# Patient Record
Sex: MALE | Race: WHITE | Employment: OTHER | URBAN - METROPOLITAN AREA
[De-identification: names, ages, dates, MRNs, and addresses within clinical notes are randomized per-mention and may not be internally consistent; named-entity substitution may affect disease eponyms.]

---

## 2017-08-28 PROBLEM — I25.10 CORONARY ARTERY DISEASE INVOLVING NATIVE CORONARY ARTERY OF NATIVE HEART WITHOUT ANGINA PECTORIS: Status: ACTIVE | Noted: 2017-08-28

## 2017-08-28 PROBLEM — M16.0 PRIMARY OSTEOARTHRITIS OF BOTH HIPS: Status: ACTIVE | Noted: 2017-08-28

## 2017-10-11 PROBLEM — Z23 NEED FOR INFLUENZA VACCINATION: Status: ACTIVE | Noted: 2017-10-11

## 2018-04-12 PROBLEM — Z23 NEED FOR INFLUENZA VACCINATION: Status: RESOLVED | Noted: 2017-10-11 | Resolved: 2018-04-12

## 2018-08-28 ENCOUNTER — OFFICE VISIT (OUTPATIENT)
Dept: FAMILY MEDICINE CLINIC | Age: 83
End: 2018-08-28
Payer: MEDICARE

## 2018-08-28 VITALS
WEIGHT: 195 LBS | HEIGHT: 69 IN | BODY MASS INDEX: 28.88 KG/M2 | DIASTOLIC BLOOD PRESSURE: 72 MMHG | OXYGEN SATURATION: 98 % | RESPIRATION RATE: 20 BRPM | SYSTOLIC BLOOD PRESSURE: 132 MMHG | HEART RATE: 54 BPM

## 2018-08-28 DIAGNOSIS — E11.9 TYPE 2 DIABETES MELLITUS WITHOUT COMPLICATION, WITHOUT LONG-TERM CURRENT USE OF INSULIN (HCC): Primary | ICD-10-CM

## 2018-08-28 DIAGNOSIS — N40.1 BPH WITH OBSTRUCTION/LOWER URINARY TRACT SYMPTOMS: ICD-10-CM

## 2018-08-28 DIAGNOSIS — I25.10 CORONARY ARTERY DISEASE INVOLVING NATIVE CORONARY ARTERY OF NATIVE HEART WITHOUT ANGINA PECTORIS: ICD-10-CM

## 2018-08-28 DIAGNOSIS — N13.8 BPH WITH OBSTRUCTION/LOWER URINARY TRACT SYMPTOMS: ICD-10-CM

## 2018-08-28 DIAGNOSIS — M47.816 SPONDYLOSIS OF LUMBAR REGION WITHOUT MYELOPATHY OR RADICULOPATHY: ICD-10-CM

## 2018-08-28 LAB — HBA1C MFR BLD: 5.6 %

## 2018-08-28 PROCEDURE — 99214 OFFICE O/P EST MOD 30 MIN: CPT | Performed by: FAMILY MEDICINE

## 2018-08-28 PROCEDURE — 83036 HEMOGLOBIN GLYCOSYLATED A1C: CPT | Performed by: FAMILY MEDICINE

## 2018-08-28 RX ORDER — LOPERAMIDE HYDROCHLORIDE 2 MG/1
2 CAPSULE ORAL PRN
COMMUNITY
End: 2019-02-27

## 2018-08-28 RX ORDER — TAMSULOSIN HYDROCHLORIDE 0.4 MG/1
0.4 CAPSULE ORAL DAILY
Qty: 90 CAPSULE | Refills: 1 | Status: SHIPPED | OUTPATIENT
Start: 2018-08-28 | End: 2019-03-04 | Stop reason: SDUPTHER

## 2018-08-28 RX ORDER — MELOXICAM 7.5 MG/1
7.5 TABLET ORAL DAILY
Qty: 90 TABLET | Refills: 1 | Status: SHIPPED | OUTPATIENT
Start: 2018-08-28 | End: 2019-02-27

## 2018-08-28 ASSESSMENT — ENCOUNTER SYMPTOMS
SHORTNESS OF BREATH: 0
NAUSEA: 0
VOMITING: 0
BACK PAIN: 1
BLURRED VISION: 0
DIARRHEA: 1

## 2018-08-28 ASSESSMENT — PATIENT HEALTH QUESTIONNAIRE - PHQ9
SUM OF ALL RESPONSES TO PHQ QUESTIONS 1-9: 0
1. LITTLE INTEREST OR PLEASURE IN DOING THINGS: 0
2. FEELING DOWN, DEPRESSED OR HOPELESS: 0
SUM OF ALL RESPONSES TO PHQ QUESTIONS 1-9: 0
SUM OF ALL RESPONSES TO PHQ9 QUESTIONS 1 & 2: 0

## 2018-08-28 NOTE — PROGRESS NOTES
CAPS Take 200 mg by mouth daily (with breakfast)   Yes Historical Provider, MD   Blood Glucose Monitoring Suppl (ONE TOUCH ULTRA 2) W/DEVICE KIT 1 kit by Does not apply route daily as needed. 4/6/15  Yes Shasha Palafox MD   metoprolol (LOPRESSOR) 25 MG tablet Take 0.5 tablets by mouth 2 times daily. 3/30/13  Yes Neel Romeo MD   aspirin 81 MG tablet Take 81 mg by mouth daily. Yes Historical Provider, MD   multivitamin [facility]) per tablet Take 1 tablet by mouth daily. Yes Historical Provider, MD   Calcium Carbonate-Vitamin D (CALCIUM + D) 600-200 MG-UNIT TABS Take 600 mg by mouth.    Yes Historical Provider, MD     Social History     Social History    Marital status:      Spouse name: N/A    Number of children: N/A    Years of education: N/A     Social History Main Topics    Smoking status: Former Smoker     Years: 32.00     Quit date: 3/26/1987    Smokeless tobacco: Former User    Alcohol use No    Drug use: No    Sexual activity: Yes     Partners: Female     Other Topics Concern    None     Social History Narrative    None       I have reviewed Hernando's allergies, medications, problem list, medical, social and family history and have updated as needed in the electronic medical record    Current Outpatient Prescriptions   Medication Sig Dispense Refill    meloxicam (MOBIC) 7.5 MG tablet Take 1 tablet by mouth daily 90 tablet 1    ONE TOUCH LANCETS MISC Check blood glucose qam 50 each 12    blood glucose test strips (ONE TOUCH ULTRA TEST) strip Check blood glucose qam 50 each 12    loperamide (IMODIUM) 2 MG capsule Take 2 mg by mouth as needed for Diarrhea      tamsulosin (FLOMAX) 0.4 MG capsule Take 1 capsule by mouth daily 90 capsule 1    Glucos-Chond-Hyal Ac-Ca Fructo (MOVE FREE JOINT HEALTH ADVANCE PO) Take by mouth 3 times daily      Handicap Placard MISC by Does not apply route Unable to ambulate more than 40 feet without difficulty  Expires 2/28/2022 1 each 0   

## 2018-09-20 ENCOUNTER — OFFICE VISIT (OUTPATIENT)
Dept: PHYSICAL MEDICINE AND REHAB | Age: 83
End: 2018-09-20
Payer: MEDICARE

## 2018-09-20 VITALS
HEIGHT: 69 IN | WEIGHT: 196 LBS | HEART RATE: 65 BPM | SYSTOLIC BLOOD PRESSURE: 138 MMHG | DIASTOLIC BLOOD PRESSURE: 68 MMHG | BODY MASS INDEX: 29.03 KG/M2

## 2018-09-20 DIAGNOSIS — M54.50 CHRONIC BILATERAL LOW BACK PAIN WITHOUT SCIATICA: Primary | ICD-10-CM

## 2018-09-20 DIAGNOSIS — M47.819 FACET ARTHROPATHY: ICD-10-CM

## 2018-09-20 DIAGNOSIS — M48.061 SPINAL STENOSIS OF LUMBAR REGION WITHOUT NEUROGENIC CLAUDICATION: ICD-10-CM

## 2018-09-20 DIAGNOSIS — G89.29 CHRONIC BILATERAL LOW BACK PAIN WITHOUT SCIATICA: Primary | ICD-10-CM

## 2018-09-20 PROCEDURE — 99204 OFFICE O/P NEW MOD 45 MIN: CPT | Performed by: PHYSICAL MEDICINE & REHABILITATION

## 2018-09-20 NOTE — PROGRESS NOTES
(FLOMAX) 0.4 MG capsule Take 1 capsule by mouth daily 90 capsule 1    Glucos-Chond-Hyal Ac-Ca Fructo (MOVE FREE JOINT HEALTH ADVANCE PO) Take by mouth 3 times daily      Handicap Placard MISC by Does not apply route Unable to ambulate more than 40 feet without difficulty  Expires 2/28/2022 1 each 0    atorvastatin (LIPITOR) 10 MG tablet       Coenzyme Q-10 200 MG CAPS Take 200 mg by mouth daily (with breakfast)      Blood Glucose Monitoring Suppl (ONE TOUCH ULTRA 2) W/DEVICE KIT 1 kit by Does not apply route daily as needed. 1 kit 0    metoprolol (LOPRESSOR) 25 MG tablet Take 0.5 tablets by mouth 2 times daily. 60 tablet 1    aspirin 81 MG tablet Take 81 mg by mouth daily.  multivitamin (THERAGRAN) per tablet Take 1 tablet by mouth daily.  Calcium Carbonate-Vitamin D (CALCIUM + D) 600-200 MG-UNIT TABS Take 600 mg by mouth. No current facility-administered medications for this visit. Past Medical History:   Diagnosis Date    Bundle branch block     CAD (coronary artery disease)     Diabetes mellitus ([facility] [address]     Hyperlipidemia LDL goal < 100 8/24/2015    Spondylosis of lumbar region without myelopathy or radiculopathy 8/28/2018       Past Surgical History:   Procedure Laterality Date    CORONARY ARTERY BYPASS GRAFT  3/27/13    off-pump cabg x 3- Dr. Maico Boo CATH LAB PROCEDURE  3/26/13    FRACTURE SURGERY      TONSILLECTOMY         Family History   Problem Relation Age of Onset    Heart Disease Mother     Heart Disease Father     Heart Disease Brother        Social History     Social History    Marital status:      Spouse name: N/A    Number of children: N/A    Years of education: N/A     Occupational History    Not on file.      Social History Main Topics    Smoking status: Former Smoker     Years: 32.00     Quit date: 3/26/1987    Smokeless tobacco: Former User    Alcohol use No    Drug use: No    Sexual activity: Yes     Partners: Female Other Topics Concern    Not on file     Social History Narrative    No narrative on file       Functional Status: The patient is able to ambulate and perform activities of daily living without the use of an assistive device. Occupation: The patient is currently retired. ROS: For more complete ROS answered by the patient, please see . Constitutional: Denies fevers, chills, night sweats, unintentional weight loss     Skin: Denies rash or skin changes     Eyes: Denies vision changes    Ears/Nose/Throat: Denies nasal congestion or sore throat     Respiratory: Denies SOB or cough     Cardiovascular: Denies CP, palpitations, edema      Gastrointestinal: Denies abdominal pain,  N/V, constipation, or diarrhea    Genitourinary: Denies urinary symptoms    Neurologic: See HPI.     MSK: See HPI. Psychiatric: Denies sleep disturbance, anxiety, depression    Hematologic/Lymphatic/Immunologic: Denies bruising       Physical Exam:   Blood pressure 138/68, pulse 65, height 5' 9\" (1.753 m), weight 196 lb (88.9 kg). General: well developed and well nourished in no acute distress. Body habitus is normal  HEENT: No rhinorrhea, sneezing, yawning, or lacrimation. No scleral icterus or conjunctival injection. Resp: symmetrical chest expansion, unlabored breathing, respirations unlabored. CV: Heart rate is regular. Peripheral pulses are palpable  Lymph: No visible regional lymphadenopathy. Skin: No rashes or ecchymosis. Normal turgor. Psych: Mood is calm. Affect is normal.   Ext: No edema noted     MSK:   Back/Hip Exam:   Inspection: Pelvis was asymmetric. Lumbar lordotic curvature was decreased. There was scoliosis present. No ecchymoses or erythema. Palpation: Palpatory exam revealed no tenderness along lumbosacral paraspinals, midline spine, SI joint sulcus, greater trochanters and TFL on both side. There was left lumbar paraspinal spasms. There were no trigger points.     ROM: ROM

## 2018-09-20 NOTE — PATIENT INSTRUCTIONS
We appreciate that you chose [facility] to provide your healthcare needs today. We took great pleasure in caring for you. You may receive a survey to help us learn how to make your next visit to a [facility] facility better than the last.   Your feedback is important to help us continually improve the service we can provide you. Please take the time to complete it thoughtfully if you receive one as we value the feedback in every survey. In this survey we would appreciate that you answer \"always\" or \"yes\" for as many questions as possible (this is the answer we get credit for doing a good job). If you feel there is a question you cannot answer \"always\" or \"yes\", please let us know before you leave today so we can remedy the situation right away. We look forward to continuing to provide you with excellent care. Considering the survey questions related to access to care, please keep in mind the urgency of your problem (i.e. was it an emergent or urgent visit or more routine)  and whether the urgency of that problem was handled appropriately by our office. We always do our best to prioritize the patients who need the care most urgently given our specialty is in short supply and there is a high demand for visits. Thank you for your time and consideration.

## 2018-10-23 ENCOUNTER — TELEPHONE (OUTPATIENT)
Dept: PHYSICAL MEDICINE AND REHAB | Age: 83
End: 2018-10-23

## 2018-10-23 NOTE — TELEPHONE ENCOUNTER
Called and spoke with the patient to make him an appointment to go over MRI results. Patient is scheduled on 11-12-18.

## 2018-10-23 NOTE — TELEPHONE ENCOUNTER
----- Message from Killian Arango DO sent at 10/23/2018  8:54 AM EDT -----  Please call patient to schedule follow up to review MRI. Nothing urgent.

## 2018-11-12 ENCOUNTER — OFFICE VISIT (OUTPATIENT)
Dept: PHYSICAL MEDICINE AND REHAB | Age: 83
End: 2018-11-12
Payer: MEDICARE

## 2018-11-12 VITALS
HEART RATE: 56 BPM | WEIGHT: 195 LBS | DIASTOLIC BLOOD PRESSURE: 82 MMHG | BODY MASS INDEX: 28.88 KG/M2 | HEIGHT: 69 IN | SYSTOLIC BLOOD PRESSURE: 137 MMHG

## 2018-11-12 DIAGNOSIS — M47.816 LUMBAR SPONDYLOSIS: Primary | ICD-10-CM

## 2018-11-12 DIAGNOSIS — M47.819 FACET ARTHROPATHY: ICD-10-CM

## 2018-11-12 DIAGNOSIS — M79.18 MYOFASCIAL PAIN: ICD-10-CM

## 2018-11-12 PROCEDURE — 99213 OFFICE O/P EST LOW 20 MIN: CPT | Performed by: PHYSICAL MEDICINE & REHABILITATION

## 2018-11-12 NOTE — PROGRESS NOTES
[employer], [id number] [facility]  [address] [place] [address] [address] [facility]  Phone: [phone]  Fax: [phone]    PCP: Yadira Forrest MD  Date of visit: 11/12/18    Chief Complaint   Patient presents with    Back Pain     follow up and results MRI       Interval:   Patient presents today to review MRI results. MRI reveals facet arthropathy, mild stenosis. He did not start PT because he wanted to wait for the MRI results. The pain is rated Pain Score:   5, is described as achy, deep, and is located in the low back without radiation to the legs. The pain is better with mobic, flexing forward. The pain is worse with standing up straight, walking. There is no associated numbness/tingling. There is no weakness. There is no bowel/bladder changes. The prior workup has included: Xray, MRI     The prior treatment has included:  PT: none, but goes to [address] [place], [place] [address] at the [facility] 3-5 times a week   Chiropractic: none    Modalities: none   OTC Tylenol: yes with relief   NSAIDS: CAD.   mobic currently with relief, voltaren gel with some relief    Opioids: none    Membrane stabilizers: none   Muscle relaxers: none    Previous injections: none   Previous surgery at this site: none      No Known Allergies    Current Outpatient Prescriptions   Medication Sig Dispense Refill    meloxicam (MOBIC) 7.5 MG tablet Take 1 tablet by mouth daily 90 tablet 1    ONE TOUCH LANCETS MISC Check blood glucose qam 50 each 12    blood glucose test strips (ONE TOUCH ULTRA TEST) strip Check blood glucose qam 50 each 12    loperamide (IMODIUM) 2 MG capsule Take 2 mg by mouth as needed for Diarrhea      tamsulosin (FLOMAX) 0.4 MG capsule Take 1 capsule by mouth daily 90 capsule 1    Glucos-Chond-Hyal Ac-Ca Fructo (MOVE FREE JOINT HEALTH ADVANCE PO) Take by mouth 3 times daily      Handicap Placard MISC by Does not apply route Unable to ambulate more than 40 feet without lower extremity dermatomes. Reflexes:   R  L  Patellar  (2+) (2+)  Ankle Jerk  (1+) (1+)    Gait is forward flexed. Imaging: (personally reviewed by me 11/12/18)  X-ray L spine      Findings:   AP and lateral views of the lumbar spine were obtained. Mild   multilevel disc space narrowing as well as facet joint narrowing with   subchondral sclerosis and spurring. No fracture.           Impression   Multilevel degenerative disc and degenerative joint   disease. MRI L Spine       T12-L1: Normal T12-L1       L1-L2: Noncompressive bulge and facet hypertrophy. No canal or   foraminal stenosis.       L2-L3: Degenerative disc height loss noted with noncompressive bulge. No central or foraminal stenosis.       L3-L4: Degenerative disc disease noted with noncompressive bulge. Mild   foraminal stenosis noted.       L4-L5: Facet hypertrophy and ligamentum flavum infolding. Small joint   effusion seen with mild disc bulge. Mild lateral recess and moderate   foraminal stenosis noted.       L5-S1: Facet hypertrophy noted with mild foraminal stenosis. No canal   stenosis.       SOFT TISSUES: There is mild increase edema seen in the paraspinal   musculature of the lower lumbar spine with no contusion or facet   abnormality.           Impression       1.  Mild edema in the lower lumbar paraspinal musculature may be   related to muscle strain. 2.  Multilevel degenerative disc disease as discussed. Findings most   severe at L4-5.             Impression:   Sintia Victoria is a 81 y.o. male     1. Lumbar spondylosis    2. Facet arthropathy    3. Myofascial pain        Plan:   · MRI reviewed with patient and wife in detail. .   · Continue voltaren gel QID PRN   · Refer to PT   · Continue [facility] at the [facility]     The patient was educated about the diagnosis, prognosis, indications, risks and benefits of treatment. An opportunity to ask questions was given to the patient and questions were answered.   The patient agreed to

## 2018-11-26 ENCOUNTER — EVALUATION (OUTPATIENT)
Dept: PHYSICAL THERAPY | Age: 83
End: 2018-11-26
Payer: MEDICARE

## 2018-11-26 DIAGNOSIS — M79.18 MYOFASCIAL PAIN: ICD-10-CM

## 2018-11-26 DIAGNOSIS — M47.816 LUMBAR SPONDYLOSIS: Primary | ICD-10-CM

## 2018-11-26 DIAGNOSIS — M47.819 FACET ARTHROPATHY: ICD-10-CM

## 2018-11-26 PROCEDURE — 97161 PT EVAL LOW COMPLEX 20 MIN: CPT | Performed by: PHYSICAL THERAPIST

## 2018-11-26 PROCEDURE — 97110 THERAPEUTIC EXERCISES: CPT | Performed by: PHYSICAL THERAPIST

## 2018-11-26 PROCEDURE — G8978 MOBILITY CURRENT STATUS: HCPCS | Performed by: PHYSICAL THERAPIST

## 2018-11-26 PROCEDURE — G8979 MOBILITY GOAL STATUS: HCPCS | Performed by: PHYSICAL THERAPIST

## 2018-11-26 NOTE — PROGRESS NOTES
Physical Therapy Treatment Note    Date: 2018  Patient Name: Daxa Matias  : 1935   MRN: [id number]  [facility]: insidious  DOSx: -  Referring Provider: DO Lainey  [address] [place] [address], [address] [facility]     Medical Diagnosis:    Diagnosis Orders   1. Lumbar spondylosis     2. Facet arthropathy     3. Myofascial pain         Outcome Measure: PT G-Codes  Functional Limitation: Mobility: Walking and moving around  Mobility: Walking and Moving Around Current Status (): At least 20 percent but less than 40 percent impaired, limited or restricted  Mobility: Walking and Moving Around Goal Status (): At least 1 percent but less than 20 percent impaired, limited or restricted    S: see eval  O:  Time 800-900     Visit 1     Pain 4/10     ROM      Modalities      MH + ES            Exercise      LB rotation X 15  TE   SKTC 2 x 30 sec each  TE   HS 2 x 30 sec  TE   Seated trunk flexion 2 x 30 sec     ALL EXERCISE DONE WITH DRAW-IN TECHNIQUE                            Functional activities To aid in reaching , pushing, pulling tasks at home     ROWS: H  \"    ROWS: M  \"    ROWS: L  \"    Obliques - high  \"    Obliques - low  \"     THEREX     Nustep   L7 x 10 min  TE   Punches      Lat pulldowns      Triceps ext standing      Marching            Trunk ext TB      Trunk flex TB      Hip abd      Hip EXT      TG Squats      Gait 2 x 100 feet  Cues for upright          A:  Tolerated well. Above added to written HEP.   P: Continue with rehab plan  Bryant Berman, PT    Treatment Charges: Mins Units   Initial Evaluation 30 1   Re-Evaluation     Ther Exercise         TE 20 1   Manual Therapy     MT     Ther Activities        TA     Gait Training          GT     Neuro Re-education NR     Modalities     Non-Billable Service Time     Other     Total Time/Units 60 2
(IMODIUM) 2 MG capsule Take 2 mg by mouth as needed for Diarrhea      tamsulosin (FLOMAX) 0.4 MG capsule Take 1 capsule by mouth daily 90 capsule 1    Glucos-Chond-Hyal Ac-Ca Fructo (MOVE FREE JOINT HEALTH ADVANCE PO) Take by mouth 3 times daily      Handicap Placard MISC by Does not apply route Unable to ambulate more than 40 feet without difficulty  Expires 2/28/2022 1 each 0    atorvastatin (LIPITOR) 10 MG tablet       Coenzyme Q-10 200 MG CAPS Take 200 mg by mouth daily (with breakfast)      Blood Glucose Monitoring Suppl (ONE TOUCH ULTRA 2) W/DEVICE KIT 1 kit by Does not apply route daily as needed. 1 kit 0    metoprolol (LOPRESSOR) 25 MG tablet Take 0.5 tablets by mouth 2 times daily. 60 tablet 1    aspirin 81 MG tablet Take 81 mg by mouth daily.  multivitamin (THERAGRAN) per tablet Take 1 tablet by mouth daily.  Calcium Carbonate-Vitamin D (CALCIUM + D) 600-200 MG-UNIT TABS Take 600 mg by mouth. No current facility-administered medications for this visit. Imaging results: No results found.     Pain:  Current: 4/10     Best: 3/10     Worst:8/10    Symptom Type/Quality: dull, aching  Location[de-identified] Back: lumbar region does not radiate       Behavior: condition is staying the same      Provoking Activities/Positions:  [] Sitting  [x] Standing  [x] Walking                                                                     [] Lying  [] Bending  [] When still                                                                     [] On the move  [] Turning head  [] A.M.                                                                     [] P.M.  [] As day progresses [] Cough                [] Sneezing                 Relieving Activitie/Positions:      [x] Sitting  [] Standing  [] Walking                                                                     [x] Lying  [] Bending  [] When still                                                                     [] On the move  [] Turning head

## 2018-11-28 ENCOUNTER — OFFICE VISIT (OUTPATIENT)
Dept: FAMILY MEDICINE CLINIC | Age: 83
End: 2018-11-28
Payer: MEDICARE

## 2018-11-28 VITALS
HEIGHT: 69 IN | WEIGHT: 194 LBS | BODY MASS INDEX: 28.73 KG/M2 | DIASTOLIC BLOOD PRESSURE: 62 MMHG | RESPIRATION RATE: 20 BRPM | SYSTOLIC BLOOD PRESSURE: 118 MMHG | HEART RATE: 64 BPM | OXYGEN SATURATION: 97 %

## 2018-11-28 DIAGNOSIS — Z23 NEED FOR IMMUNIZATION AGAINST INFLUENZA: ICD-10-CM

## 2018-11-28 DIAGNOSIS — G89.29 CHRONIC MIDLINE LOW BACK PAIN WITHOUT SCIATICA: Primary | ICD-10-CM

## 2018-11-28 DIAGNOSIS — M54.50 CHRONIC MIDLINE LOW BACK PAIN WITHOUT SCIATICA: Primary | ICD-10-CM

## 2018-11-28 PROCEDURE — G0008 ADMIN INFLUENZA VIRUS VAC: HCPCS | Performed by: FAMILY MEDICINE

## 2018-11-28 PROCEDURE — 99213 OFFICE O/P EST LOW 20 MIN: CPT | Performed by: FAMILY MEDICINE

## 2018-11-28 PROCEDURE — 90662 IIV NO PRSV INCREASED AG IM: CPT | Performed by: FAMILY MEDICINE

## 2018-11-28 ASSESSMENT — PATIENT HEALTH QUESTIONNAIRE - PHQ9
SUM OF ALL RESPONSES TO PHQ QUESTIONS 1-9: 0
1. LITTLE INTEREST OR PLEASURE IN DOING THINGS: 0
SUM OF ALL RESPONSES TO PHQ QUESTIONS 1-9: 0
2. FEELING DOWN, DEPRESSED OR HOPELESS: 0
SUM OF ALL RESPONSES TO PHQ9 QUESTIONS 1 & 2: 0

## 2018-11-28 ASSESSMENT — ENCOUNTER SYMPTOMS
VOMITING: 0
BACK PAIN: 1
SHORTNESS OF BREATH: 0
BLOOD IN STOOL: 0
NAUSEA: 0
DIARRHEA: 0

## 2018-11-30 ENCOUNTER — TREATMENT (OUTPATIENT)
Dept: PHYSICAL THERAPY | Age: 83
End: 2018-11-30
Payer: MEDICARE

## 2018-11-30 DIAGNOSIS — M47.816 LUMBAR SPONDYLOSIS: Primary | ICD-10-CM

## 2018-11-30 PROCEDURE — 97110 THERAPEUTIC EXERCISES: CPT

## 2018-11-30 NOTE — PROGRESS NOTES
Physical Therapy Treatment Note    Date: 2018  Patient Name: Josephine Arellano  : 1935   MRN: [id number]  [facility]: insidious  DOSx: -  Referring Provider: Urbano Gibbs DO  [address] [address], [address] [facility]     Medical Diagnosis:    Diagnosis Orders   1. Lumbar spondylosis         Outcome Measure:      S: pt stated he was very sore after performing given HEP, will continue to perform but decrease intensity. O:  Time 6301-7252 am     Visit -8           Pain 4/10     ROM      Modalities      MH + ES            Exercise      LB rotation X 15               HEP  TE   SKTC 2 x 30 sec each                  \" TE   HS 2 x 30 sec                  \" TE   Seated trunk flexion 2 x 30 sec                  \" TE   ALL EXERCISE DONE WITH DRAW-IN TECHNIQUE                            Functional activities To aid in reaching , pushing, pulling tasks at home     ROWS: H Green 2 x 20  \" TE   ROWS: M Green 2 x 20  \" TE   ROWS: L Green 2 x 20  \" TE   Obliques - high      Obliques - low       THEREX     Nustep   L7 x 10 min  TE   Punches      Lat pulldowns      Triceps ext standing            Calf raises  2 x 20   TE   Sidekicks  2 x 20  TE   Marching 2 x 20   TE   Mini squats  2 x 20   TE         Trunk ext TB      Trunk flex TB      Hip abd      Hip EXT      TG Squats               A:  Tolerated well. Above added to written HEP.   P: Continue with rehab plan  Virginia [place], PTA    Treatment Charges: Mins Units   Initial Evaluation     Re-Evaluation     Ther Exercise         TE 50 3   Manual Therapy     MT     Ther Activities        TA     Gait Training          GT     Neuro Re-education NR     Modalities     Non-Billable Service Time     Other     Total Time/Units 50 3

## 2018-12-03 ENCOUNTER — TREATMENT (OUTPATIENT)
Dept: PHYSICAL THERAPY | Age: 83
End: 2018-12-03
Payer: MEDICARE

## 2018-12-03 DIAGNOSIS — M47.816 LUMBAR SPONDYLOSIS: Primary | ICD-10-CM

## 2018-12-03 PROCEDURE — 97110 THERAPEUTIC EXERCISES: CPT

## 2018-12-04 ENCOUNTER — HOSPITAL ENCOUNTER (OUTPATIENT)
Age: 83
Discharge: HOME | End: 2018-12-06
Payer: MEDICARE

## 2018-12-04 DIAGNOSIS — E11.9 TYPE 2 DIABETES MELLITUS WITHOUT COMPLICATION, WITHOUT LONG-TERM CURRENT USE OF INSULIN (HCC): ICD-10-CM

## 2018-12-04 LAB
ALBUMIN SERPL-MCNC: 4.2 G/DL (ref 3.5–5.2)
ALP BLD-CCNC: 50 U/L (ref 40–129)
ALT SERPL-CCNC: 15 U/L (ref 0–40)
ANION GAP SERPL CALCULATED.3IONS-SCNC: 13 MMOL/L (ref 7–16)
AST SERPL-CCNC: 23 U/L (ref 0–39)
BILIRUB SERPL-MCNC: 0.4 MG/DL (ref 0–1.2)
BUN BLDV-MCNC: 20 MG/DL (ref 8–23)
CALCIUM SERPL-MCNC: 9.4 MG/DL (ref 8.6–10.2)
CHLORIDE BLD-SCNC: 104 MMOL/L (ref 98–107)
CHOLESTEROL, TOTAL: 130 MG/DL (ref 0–199)
CO2: 26 MMOL/L (ref 22–29)
CREAT SERPL-MCNC: 1 MG/DL (ref 0.7–1.2)
CREATININE URINE: 66 MG/DL (ref 40–278)
CREATININE URINE: 66 MG/DL (ref 40–278)
GFR AFRICAN AMERICAN: >60
GFR NON-AFRICAN AMERICAN: >60 ML/MIN/1.73
GLUCOSE BLD-MCNC: 99 MG/DL (ref 74–99)
HCT VFR BLD CALC: 42.2 % (ref 37–54)
HCT VFR BLD CALC: 42.2 % (ref 37–54)
HDLC SERPL-MCNC: 37 MG/DL
HEMOGLOBIN: 13.1 G/DL (ref 12.5–16.5)
HEMOGLOBIN: 13.1 G/DL (ref 12.5–16.5)
LDL CHOLESTEROL CALCULATED: 73 MG/DL (ref 0–99)
MCH RBC QN AUTO: 31.8 PG (ref 26–35)
MCH RBC QN AUTO: 31.8 PG (ref 26–35)
MCHC RBC AUTO-ENTMCNC: 31 % (ref 32–34.5)
MCHC RBC AUTO-ENTMCNC: 31 % (ref 32–34.5)
MCV RBC AUTO: 102.4 FL (ref 80–99.9)
MCV RBC AUTO: 102.4 FL (ref 80–99.9)
MICROALBUMIN UR-MCNC: <12 MG/L
MICROALBUMIN UR-MCNC: <12 MG/L
MICROALBUMIN/CREAT UR-RTO: ABNORMAL (ref 0–30)
MICROALBUMIN/CREAT UR-RTO: ABNORMAL (ref 0–30)
PDW BLD-RTO: 14.9 FL (ref 11.5–15)
PDW BLD-RTO: 14.9 FL (ref 11.5–15)
PLATELET # BLD: 205 E9/L (ref 130–450)
PLATELET # BLD: 205 E9/L (ref 130–450)
PMV BLD AUTO: 8.9 FL (ref 7–12)
PMV BLD AUTO: 8.9 FL (ref 7–12)
POTASSIUM SERPL-SCNC: 4.9 MMOL/L (ref 3.5–5)
RBC # BLD: 4.12 E12/L (ref 3.8–5.8)
RBC # BLD: 4.12 E12/L (ref 3.8–5.8)
SODIUM BLD-SCNC: 143 MMOL/L (ref 132–146)
T3 TOTAL: 97.77 NG/DL (ref 80–200)
T4 TOTAL: 5.4 MCG/DL (ref 4.5–11.7)
TOTAL PROTEIN: 7.1 G/DL (ref 6.4–8.3)
TRIGL SERPL-MCNC: 100 MG/DL (ref 0–149)
TSH SERPL DL<=0.05 MIU/L-ACNC: 4.21 UIU/ML (ref 0.27–4.2)
VLDLC SERPL CALC-MCNC: 20 MG/DL
WBC # BLD: 4.5 E9/L (ref 4.5–11.5)
WBC # BLD: 4.5 E9/L (ref 4.5–11.5)

## 2018-12-04 PROCEDURE — 80053 COMPREHEN METABOLIC PANEL: CPT

## 2018-12-04 PROCEDURE — 84436 ASSAY OF TOTAL THYROXINE: CPT

## 2018-12-04 PROCEDURE — 85027 COMPLETE CBC AUTOMATED: CPT

## 2018-12-04 PROCEDURE — 82044 UR ALBUMIN SEMIQUANTITATIVE: CPT

## 2018-12-04 PROCEDURE — 84480 ASSAY TRIIODOTHYRONINE (T3): CPT

## 2018-12-04 PROCEDURE — 36415 COLL VENOUS BLD VENIPUNCTURE: CPT

## 2018-12-04 PROCEDURE — 84443 ASSAY THYROID STIM HORMONE: CPT

## 2018-12-04 PROCEDURE — 80061 LIPID PANEL: CPT

## 2018-12-04 PROCEDURE — 82570 ASSAY OF URINE CREATININE: CPT

## 2018-12-13 ENCOUNTER — TREATMENT (OUTPATIENT)
Dept: PHYSICAL THERAPY | Age: 83
End: 2018-12-13
Payer: MEDICARE

## 2018-12-13 DIAGNOSIS — M47.816 LUMBAR SPONDYLOSIS: Primary | ICD-10-CM

## 2018-12-13 PROCEDURE — 97110 THERAPEUTIC EXERCISES: CPT

## 2018-12-20 ENCOUNTER — TREATMENT (OUTPATIENT)
Dept: PHYSICAL THERAPY | Age: 83
End: 2018-12-20
Payer: MEDICARE

## 2018-12-20 DIAGNOSIS — M47.816 LUMBAR SPONDYLOSIS: Primary | ICD-10-CM

## 2018-12-20 PROCEDURE — 97110 THERAPEUTIC EXERCISES: CPT

## 2018-12-27 ENCOUNTER — TREATMENT (OUTPATIENT)
Dept: PHYSICAL THERAPY | Age: 83
End: 2018-12-27
Payer: MEDICARE

## 2018-12-27 DIAGNOSIS — M47.816 LUMBAR SPONDYLOSIS: Primary | ICD-10-CM

## 2018-12-27 PROCEDURE — G8978 MOBILITY CURRENT STATUS: HCPCS

## 2018-12-27 PROCEDURE — 97110 THERAPEUTIC EXERCISES: CPT

## 2018-12-27 PROCEDURE — G8979 MOBILITY GOAL STATUS: HCPCS

## 2018-12-27 PROCEDURE — G8980 MOBILITY D/C STATUS: HCPCS

## 2018-12-27 NOTE — PROGRESS NOTES
Physical Therapy Treatment Note    Date: 2018  Patient Name: Kylie Call  : 1935   MRN: [id number]  [facility]: insidious  DOSx: -  Referring Provider: Dixon Simeon DO  [address] [address], [address] [facility]      Medical Diagnosis:     Diagnosis Orders   1. Lumbar spondylosis         Outcome Measure:      S: Pt reports minimal soreness today. O:  Time 1045- 1130  am     Visit 6  /  orthonet             Pain 3-4 /10    G code 18    ROM      Modalities      +    MO         Exercise      LB rotation X 15               HEP  TE   SKTC 2 x 30 sec each                  \" TE   HS 2 x 30 sec                  \" TE   Seated trunk flexion 2 x 30 sec                  \" TE   ALL EXERCISE DONE WITH DRAW-IN TECHNIQUE                            Functional activities To aid in reaching , pushing, pulling tasks at home     ROWS: H [place] 3 x 10  \" TE   ROWS: Gilma Maple 3 x 10  \" TE   ROWS: L [place] 3 x 10  \" TE   Obliques - high      Obliques - low       THEREX   Pt has t tubing at home from past PT. Nustep   L7 x 10 min  TE   Punches      Lat pulldowns      Triceps ext standing            Calf raises  2 x 20   TE   Sidekicks  2 x 20  pt given written HEP 18  TE   Marching 2 x 20   TE   Mini squats  2 x 20   TE         Sit to stand  3 x 10   TE         Trunk ext TB      Trunk flex TB      Hip abd      Hip EXT      TG Squats               A:  Tolerated well. ( Above added to written HEP. Pt requested to use a gray theraband d/t used to performing above exercises with 20# weight at [address] [facility] ) . P:last rx session, pt to continue at gym with HEP.    Giovanny Lomeli PTA    Treatment Charges: Mins Units   Initial Evaluation     Re-Evaluation     Ther Exercise         TE 35 2   Manual Therapy     MT     Ther Activities        TA     Gait Training          GT     Neuro Re-education NR     Modalities     Non-Billable Service Time     Other     Total Time/Units 35 2

## 2019-01-02 ENCOUNTER — OFFICE VISIT (OUTPATIENT)
Dept: PHYSICAL MEDICINE AND REHAB | Age: 84
End: 2019-01-02
Payer: MEDICARE

## 2019-01-02 ENCOUNTER — PREP FOR PROCEDURE (OUTPATIENT)
Dept: PHYSICAL MEDICINE AND REHAB | Age: 84
End: 2019-01-02

## 2019-01-02 VITALS
HEIGHT: 69 IN | WEIGHT: 200 LBS | HEART RATE: 66 BPM | DIASTOLIC BLOOD PRESSURE: 75 MMHG | SYSTOLIC BLOOD PRESSURE: 147 MMHG | BODY MASS INDEX: 29.62 KG/M2

## 2019-01-02 DIAGNOSIS — M47.819 FACET ARTHROPATHY: ICD-10-CM

## 2019-01-02 DIAGNOSIS — M47.816 SPONDYLOSIS WITHOUT MYELOPATHY OR RADICULOPATHY, LUMBAR REGION: Primary | ICD-10-CM

## 2019-01-02 DIAGNOSIS — M47.816 SPONDYLOSIS OF LUMBAR REGION WITHOUT MYELOPATHY OR RADICULOPATHY: Primary | ICD-10-CM

## 2019-01-02 PROCEDURE — 99214 OFFICE O/P EST MOD 30 MIN: CPT | Performed by: PHYSICAL MEDICINE & REHABILITATION

## 2019-02-28 ENCOUNTER — HOSPITAL ENCOUNTER (OUTPATIENT)
Dept: OPERATING ROOM | Age: 84
Discharge: HOME | End: 2019-02-28
Payer: MEDICARE

## 2019-02-28 ENCOUNTER — HOSPITAL ENCOUNTER (OUTPATIENT)
Age: 84
Setting detail: OUTPATIENT SURGERY
Discharge: HOME | End: 2019-02-28
Attending: PHYSICAL MEDICINE & REHABILITATION | Admitting: PHYSICAL MEDICINE & REHABILITATION
Payer: MEDICARE

## 2019-02-28 VITALS
RESPIRATION RATE: 18 BRPM | SYSTOLIC BLOOD PRESSURE: 130 MMHG | OXYGEN SATURATION: 98 % | HEART RATE: 57 BPM | DIASTOLIC BLOOD PRESSURE: 65 MMHG

## 2019-02-28 DIAGNOSIS — M47.896 OTHER OSTEOARTHRITIS OF SPINE, LUMBAR REGION: ICD-10-CM

## 2019-02-28 PROCEDURE — 7100000011 HC PHASE II RECOVERY - ADDTL 15 MIN: Performed by: PHYSICAL MEDICINE & REHABILITATION

## 2019-02-28 PROCEDURE — 3209999900 FLUORO FOR SURGICAL PROCEDURES

## 2019-02-28 PROCEDURE — 6360000002 HC RX W HCPCS: Performed by: PHYSICAL MEDICINE & REHABILITATION

## 2019-02-28 PROCEDURE — 3600000005 HC SURGERY LEVEL 5 BASE: Performed by: PHYSICAL MEDICINE & REHABILITATION

## 2019-02-28 PROCEDURE — 64494 INJ PARAVERT F JNT L/S 2 LEV: CPT | Performed by: PHYSICAL MEDICINE & REHABILITATION

## 2019-02-28 PROCEDURE — 64493 INJ PARAVERT F JNT L/S 1 LEV: CPT | Performed by: PHYSICAL MEDICINE & REHABILITATION

## 2019-02-28 PROCEDURE — 2500000003 HC RX 250 WO HCPCS: Performed by: PHYSICAL MEDICINE & REHABILITATION

## 2019-02-28 PROCEDURE — 7100000010 HC PHASE II RECOVERY - FIRST 15 MIN: Performed by: PHYSICAL MEDICINE & REHABILITATION

## 2019-02-28 PROCEDURE — 2709999900 HC NON-CHARGEABLE SUPPLY: Performed by: PHYSICAL MEDICINE & REHABILITATION

## 2019-02-28 RX ORDER — LIDOCAINE HYDROCHLORIDE 10 MG/ML
INJECTION, SOLUTION EPIDURAL; INFILTRATION; INTRACAUDAL; PERINEURAL PRN
Status: DISCONTINUED | OUTPATIENT
Start: 2019-02-28 | End: 2019-02-28 | Stop reason: ALTCHOICE

## 2019-02-28 ASSESSMENT — PAIN - FUNCTIONAL ASSESSMENT: PAIN_FUNCTIONAL_ASSESSMENT: 0-10

## 2019-02-28 ASSESSMENT — PAIN DESCRIPTION - DESCRIPTORS: DESCRIPTORS: ACHING

## 2019-02-28 ASSESSMENT — PAIN SCALES - GENERAL
PAINLEVEL_OUTOF10: 0
PAINLEVEL_OUTOF10: 0

## 2019-03-04 ENCOUNTER — OFFICE VISIT (OUTPATIENT)
Dept: FAMILY MEDICINE CLINIC | Age: 84
End: 2019-03-04
Payer: MEDICARE

## 2019-03-04 VITALS
HEIGHT: 69 IN | SYSTOLIC BLOOD PRESSURE: 124 MMHG | DIASTOLIC BLOOD PRESSURE: 72 MMHG | HEART RATE: 62 BPM | OXYGEN SATURATION: 99 % | RESPIRATION RATE: 20 BRPM | WEIGHT: 197 LBS | BODY MASS INDEX: 29.18 KG/M2

## 2019-03-04 DIAGNOSIS — N40.1 BPH WITH OBSTRUCTION/LOWER URINARY TRACT SYMPTOMS: ICD-10-CM

## 2019-03-04 DIAGNOSIS — Z00.00 ROUTINE GENERAL MEDICAL EXAMINATION AT A HEALTH CARE FACILITY: Primary | ICD-10-CM

## 2019-03-04 DIAGNOSIS — E11.9 TYPE 2 DIABETES MELLITUS WITHOUT COMPLICATION, WITHOUT LONG-TERM CURRENT USE OF INSULIN (HCC): ICD-10-CM

## 2019-03-04 DIAGNOSIS — N13.8 BPH WITH OBSTRUCTION/LOWER URINARY TRACT SYMPTOMS: ICD-10-CM

## 2019-03-04 LAB — HBA1C MFR BLD: 5.8 %

## 2019-03-04 PROCEDURE — 83036 HEMOGLOBIN GLYCOSYLATED A1C: CPT | Performed by: FAMILY MEDICINE

## 2019-03-04 PROCEDURE — G0439 PPPS, SUBSEQ VISIT: HCPCS | Performed by: FAMILY MEDICINE

## 2019-03-04 RX ORDER — TAMSULOSIN HYDROCHLORIDE 0.4 MG/1
0.4 CAPSULE ORAL DAILY
Qty: 90 CAPSULE | Refills: 1 | Status: SHIPPED | OUTPATIENT
Start: 2019-03-04 | End: 2019-09-09 | Stop reason: SDUPTHER

## 2019-03-04 ASSESSMENT — PATIENT HEALTH QUESTIONNAIRE - PHQ9
SUM OF ALL RESPONSES TO PHQ QUESTIONS 1-9: 0
SUM OF ALL RESPONSES TO PHQ QUESTIONS 1-9: 0

## 2019-04-03 ENCOUNTER — OFFICE VISIT (OUTPATIENT)
Dept: PHYSICAL MEDICINE AND REHAB | Age: 84
End: 2019-04-03
Payer: MEDICARE

## 2019-04-03 VITALS
SYSTOLIC BLOOD PRESSURE: 123 MMHG | HEART RATE: 69 BPM | DIASTOLIC BLOOD PRESSURE: 71 MMHG | BODY MASS INDEX: 29.92 KG/M2 | WEIGHT: 202 LBS | HEIGHT: 69 IN

## 2019-04-03 DIAGNOSIS — M47.819 FACET ARTHROPATHY: ICD-10-CM

## 2019-04-03 DIAGNOSIS — M47.816 SPONDYLOSIS OF LUMBAR REGION WITHOUT MYELOPATHY OR RADICULOPATHY: Primary | ICD-10-CM

## 2019-04-03 PROCEDURE — 99213 OFFICE O/P EST LOW 20 MIN: CPT | Performed by: PHYSICAL MEDICINE & REHABILITATION

## 2019-04-03 NOTE — PROGRESS NOTES
Tuan Kohler, [address] [facility]  [address] [facility]  Phone: [phone]  Fax: [phone]    PCP: Dayana Terrell MD  Date of visit: 4/3/19    Chief Complaint   Patient presents with    Back Pain     follow up after steve       Interval:   Patient presents today for follow up visit. He underwent bilateral L4-5 and L5-S1 facet injections 2/28/19 and reports 90% relief. He is able to stand up straighter and walk further distances. He is happy with where he is right now. He is traveling the [place] for the next couple of months. He hasn't been able to do this in 3 years because of his back pain. The pain is rated Pain Score:   2, is described as achy. He continues HEP and exercises daily with cardio and lifting at [address] [place] [address]. There is no associated numbness/tingling. There is no weakness. There is no bowel/bladder changes. The prior workup has included: Xray, MRI     The prior treatment has included:  PT: completed with relief, goes to [address] [place], [place] [address] 3-5 times a week   Chiropractic: none    Modalities: none   OTC Tylenol: yes with relief   NSAIDS: CAD.   mobic currently with relief, voltaren gel with some relief    Opioids: none    Membrane stabilizers: none   Muscle relaxers: none    Previous injections: bilateral L4-5 and L5-S1 facet injections- 90% relief    Previous surgery at this site: none      No Known Allergies    Current Outpatient Medications   Medication Sig Dispense Refill    diclofenac sodium (VOLTAREN) 1 % GEL Apply 4 g topically 4 times daily as needed (pain) 480 g 5    diclofenac sodium 1 % GEL apply 4 grams topically to the affected area four times a day as needed for pain  2    tamsulosin (FLOMAX) 0.4 MG capsule Take 1 capsule by mouth daily 90 capsule 1    CINNAMON PO Take 1,000 mg by mouth daily Takes 2 tablets      ONE TOUCH LANCETS MISC Check blood glucose qam 50 each 12    blood glucose test strips (ONE TOUCH ULTRA TEST) strip Check blood glucose qam 50 each 12    Handicap Placard MISC by Does not apply route Unable to ambulate more than 40 feet without difficulty  Expires 2022 1 each 0    atorvastatin (LIPITOR) 10 MG tablet Take 10 mg by mouth daily       Blood Glucose Monitoring Suppl (ONE TOUCH ULTRA 2) W/DEVICE KIT 1 kit by Does not apply route daily as needed. 1 kit 0    metoprolol (LOPRESSOR) 25 MG tablet Take 0.5 tablets by mouth 2 times daily. 60 tablet 1    aspirin 81 MG tablet Take 81 mg by mouth daily.  multivitamin (THERAGRAN) per tablet Take 1 tablet by mouth daily.  Calcium Carbonate-Vitamin D (CALCIUM + D) 600-200 MG-UNIT TABS Take 600 mg by mouth.  Glucos-Chond-Hyal Ac-Ca Fructo (MOVE FREE JOINT HEALTH ADVANCE PO) Take by mouth 3 times daily       No current facility-administered medications for this visit.         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 Emilie Kiser CATH LAB PROCEDURE  3/26/13    FRACTURE SURGERY      NERVE BLOCK Bilateral 2019    lumbar intra-articular facet    TONSILLECTOMY         Family History   Problem Relation Age of Onset    Heart Disease Mother     Heart Disease Father     Heart Disease Brother        Social History     Tobacco Use    Smoking status: Former Smoker     Years: 32.00     Last attempt to quit: 3/26/1987     Years since quittin.0    Smokeless tobacco: Former User   Substance Use Topics    Alcohol use: No     Alcohol/week: 0.0 oz    Drug use: No       Functional Status: The patient is able to ambulate and perform activities of daily living without the use of an assistive device. Occupation: The patient is currently retired. ROS:   Constitutional: Denies fevers, chills, night sweats, unintentional weight loss     Skin: Denies rash or skin changes     Eyes: Denies vision changes    Ears/Nose/Throat: Denies nasal congestion or sore throat     Respiratory: Denies SOB or cough     Cardiovascular: Denies CP, palpitations, edema      Gastrointestinal: Denies abdominal pain,  N/V, constipation, or diarrhea    Genitourinary: Denies urinary symptoms    Neurologic: See HPI.     MSK: See HPI. Psychiatric: Denies sleep disturbance, anxiety, depression    Hematologic/Lymphatic/Immunologic: Denies bruising       Physical Exam:   Blood pressure 123/71, pulse 69, height 5' 9\" (1.753 m), weight 202 lb (91.6 kg). General: well developed and well nourished in no acute distress. Body habitus is normal  HEENT: No rhinorrhea, sneezing, yawning, or lacrimation. No scleral icterus or conjunctival injection. Resp: symmetrical chest expansion, unlabored breathing, respirations unlabored. CV: Heart rate is regular. Peripheral pulses are palpable  Lymph: No visible regional lymphadenopathy. Skin: No rashes or ecchymosis. Normal turgor. Psych: Mood is calm. Affect is normal.   Ext: No edema noted     MSK:   Back/Hip Exam:   Inspection: Pelvis was asymmetric. Lumbar lordotic curvature was decreased. There was scoliosis present. No ecchymoses or erythema. Palpation: Palpatory exam revealed no tenderness along lumbosacral paraspinals, no ttp midline spine, SI joint sulcus, greater trochanters and TFL on both side. There was left lumbar paraspinal spasms. There were no trigger points.       Neurological Exam:  Strength:   R  L  Hip Flex  5  5  Knee Ext  5  5  Ankle dorsi  5  5  EHL   5 5  Ankle The patient was educated about the diagnosis, prognosis, indications, risks and benefits of treatment. An opportunity to ask questions was given to the patient and questions were answered. The patient agreed to proceed with the recommended treatment as described above.      [facility]   Board Certified Physical Medicine and Rehabilitation

## 2019-04-03 NOTE — PATIENT INSTRUCTIONS
We appreciate that you chose [facility] to provide your healthcare needs today. We took great pleasure in caring for you. You may receive a survey to help us learn how to make your next visit to a [facility] facility better than the last.   Your feedback is important to help us continually improve the service we can provide you. Please take the time to complete it thoughtfully if you receive one as we value the feedback in every survey. In this survey we would appreciate that you answer \"always\" or \"yes\" for as many questions as possible (this is the answer we get credit for doing a good job). If you feel there is a question you cannot answer \"always\" or \"yes\", please let us know before you leave today so we can remedy the situation right away. We look forward to continuing to provide you with excellent care. Considering the survey questions related to access to care, please keep in mind the urgency of your problem (i.e. was it an emergent or urgent visit or more routine)  and whether the urgency of that problem was handled appropriately by our office. We always do our best to prioritize the patients who need the care most urgently given our specialty is in short supply and there is a high demand for visits. Thank you for your time and consideration.

## 2019-05-10 DIAGNOSIS — E11.9 TYPE 2 DIABETES MELLITUS WITHOUT COMPLICATION, WITHOUT LONG-TERM CURRENT USE OF INSULIN (HCC): ICD-10-CM

## 2019-09-09 ENCOUNTER — OFFICE VISIT (OUTPATIENT)
Dept: FAMILY MEDICINE CLINIC | Age: 84
End: 2019-09-09
Payer: MEDICARE

## 2019-09-09 VITALS
DIASTOLIC BLOOD PRESSURE: 72 MMHG | BODY MASS INDEX: 29.77 KG/M2 | OXYGEN SATURATION: 97 % | SYSTOLIC BLOOD PRESSURE: 122 MMHG | HEART RATE: 60 BPM | HEIGHT: 69 IN | WEIGHT: 201 LBS | RESPIRATION RATE: 20 BRPM

## 2019-09-09 DIAGNOSIS — E11.9 TYPE 2 DIABETES MELLITUS WITHOUT COMPLICATION, WITHOUT LONG-TERM CURRENT USE OF INSULIN (HCC): Primary | ICD-10-CM

## 2019-09-09 DIAGNOSIS — N13.8 BPH WITH OBSTRUCTION/LOWER URINARY TRACT SYMPTOMS: ICD-10-CM

## 2019-09-09 DIAGNOSIS — Z23 NEED FOR PROPHYLACTIC VACCINATION AND INOCULATION AGAINST INFLUENZA: ICD-10-CM

## 2019-09-09 DIAGNOSIS — N40.1 BPH WITH OBSTRUCTION/LOWER URINARY TRACT SYMPTOMS: ICD-10-CM

## 2019-09-09 DIAGNOSIS — I25.10 CORONARY ARTERY DISEASE INVOLVING NATIVE CORONARY ARTERY OF NATIVE HEART WITHOUT ANGINA PECTORIS: ICD-10-CM

## 2019-09-09 LAB — HBA1C MFR BLD: 5.8 %

## 2019-09-09 PROCEDURE — 90653 IIV ADJUVANT VACCINE IM: CPT | Performed by: FAMILY MEDICINE

## 2019-09-09 PROCEDURE — 99213 OFFICE O/P EST LOW 20 MIN: CPT | Performed by: FAMILY MEDICINE

## 2019-09-09 PROCEDURE — 83036 HEMOGLOBIN GLYCOSYLATED A1C: CPT | Performed by: FAMILY MEDICINE

## 2019-09-09 PROCEDURE — G0008 ADMIN INFLUENZA VIRUS VAC: HCPCS | Performed by: FAMILY MEDICINE

## 2019-09-09 RX ORDER — TAMSULOSIN HYDROCHLORIDE 0.4 MG/1
0.4 CAPSULE ORAL DAILY
Qty: 90 CAPSULE | Refills: 1 | Status: SHIPPED
Start: 2019-09-09 | End: 2020-03-09 | Stop reason: SDUPTHER

## 2019-09-09 ASSESSMENT — ENCOUNTER SYMPTOMS
VOMITING: 0
NAUSEA: 0
DIARRHEA: 0
SHORTNESS OF BREATH: 0

## 2019-09-11 DIAGNOSIS — E11.9 TYPE 2 DIABETES MELLITUS WITHOUT COMPLICATION, WITHOUT LONG-TERM CURRENT USE OF INSULIN (HCC): ICD-10-CM

## 2019-12-09 ENCOUNTER — HOSPITAL ENCOUNTER (OUTPATIENT)
Age: 84
Discharge: HOME | End: 2019-12-11
Payer: MEDICARE

## 2019-12-09 LAB
ALBUMIN SERPL-MCNC: 4.1 G/DL (ref 3.5–5.2)
ALP BLD-CCNC: 64 U/L (ref 40–129)
ALT SERPL-CCNC: 14 U/L (ref 0–40)
ANION GAP SERPL CALCULATED.3IONS-SCNC: 13 MMOL/L (ref 7–16)
AST SERPL-CCNC: 17 U/L (ref 0–39)
BILIRUB SERPL-MCNC: 0.4 MG/DL (ref 0–1.2)
BUN BLDV-MCNC: 21 MG/DL (ref 8–23)
CALCIUM SERPL-MCNC: 9.4 MG/DL (ref 8.6–10.2)
CHLORIDE BLD-SCNC: 109 MMOL/L (ref 98–107)
CHOLESTEROL, TOTAL: 129 MG/DL (ref 0–199)
CO2: 22 MMOL/L (ref 22–29)
CREAT SERPL-MCNC: 1.3 MG/DL (ref 0.7–1.2)
GFR AFRICAN AMERICAN: >60
GFR NON-AFRICAN AMERICAN: 53 ML/MIN/1.73
GLUCOSE BLD-MCNC: 118 MG/DL (ref 74–99)
HCT VFR BLD CALC: 44.4 % (ref 37–54)
HDLC SERPL-MCNC: 38 MG/DL
HEMOGLOBIN: 14.1 G/DL (ref 12.5–16.5)
LDL CHOLESTEROL CALCULATED: 75 MG/DL (ref 0–99)
MCH RBC QN AUTO: 32 PG (ref 26–35)
MCHC RBC AUTO-ENTMCNC: 31.8 % (ref 32–34.5)
MCV RBC AUTO: 100.7 FL (ref 80–99.9)
PDW BLD-RTO: 14.8 FL (ref 11.5–15)
PLATELET # BLD: 184 E9/L (ref 130–450)
PMV BLD AUTO: 8.9 FL (ref 7–12)
POTASSIUM SERPL-SCNC: 5.2 MMOL/L (ref 3.5–5)
RBC # BLD: 4.41 E12/L (ref 3.8–5.8)
SODIUM BLD-SCNC: 144 MMOL/L (ref 132–146)
T3 UPTAKE PERCENT: 32.2 % (ref 22.5–37)
T4 TOTAL: 5.1 MCG/DL (ref 4.5–11.7)
TOTAL PROTEIN: 7 G/DL (ref 6.4–8.3)
TRIGL SERPL-MCNC: 80 MG/DL (ref 0–149)
TSH SERPL DL<=0.05 MIU/L-ACNC: 5.5 UIU/ML (ref 0.27–4.2)
VLDLC SERPL CALC-MCNC: 16 MG/DL
WBC # BLD: 4.5 E9/L (ref 4.5–11.5)

## 2019-12-09 PROCEDURE — 84479 ASSAY OF THYROID (T3 OR T4): CPT

## 2019-12-09 PROCEDURE — 85027 COMPLETE CBC AUTOMATED: CPT

## 2019-12-09 PROCEDURE — 80061 LIPID PANEL: CPT

## 2019-12-09 PROCEDURE — 36415 COLL VENOUS BLD VENIPUNCTURE: CPT

## 2019-12-09 PROCEDURE — 80053 COMPREHEN METABOLIC PANEL: CPT

## 2019-12-09 PROCEDURE — 84443 ASSAY THYROID STIM HORMONE: CPT

## 2019-12-09 PROCEDURE — 84436 ASSAY OF TOTAL THYROXINE: CPT

## 2020-01-20 ENCOUNTER — OFFICE VISIT (OUTPATIENT)
Dept: PHYSICAL MEDICINE AND REHAB | Age: 85
End: 2020-01-20
Payer: MEDICARE

## 2020-01-20 VITALS
DIASTOLIC BLOOD PRESSURE: 73 MMHG | BODY MASS INDEX: 30.23 KG/M2 | SYSTOLIC BLOOD PRESSURE: 138 MMHG | HEART RATE: 50 BPM | HEIGHT: 69 IN | WEIGHT: 204.1 LBS

## 2020-01-20 PROCEDURE — 99214 OFFICE O/P EST MOD 30 MIN: CPT | Performed by: PHYSICAL MEDICINE & REHABILITATION

## 2020-01-20 NOTE — H&P
Duran Mcginnis, [address] [facility]  [address] [facility]  Phone: [phone]  Fax: [phone]    PCP: Eveline Smith MD  Date of visit: 1/20/20    Chief Complaint   Patient presents with    Back Pain     discuss nerve block       Interval:   Patient presents today for office visit regarding low back pain. He underwent bilateral L4-5 and L5-S1 facet injections 2/28/19 and reports 90% relief for 6 weeks. He complains of the same pain today as prior to his injections. The pain is rated Pain Score:   4, is described as achy, located in the low back without radiating. Worse with walking and standing. Better with sitting. He continues HEP and exercises daily with cardio and lifting at [address] [place] [address]. There is no associated numbness/tingling. There is no weakness. There is no bowel/bladder changes. The prior workup has included: Xray, MRI     The prior treatment has included:  PT: completed, goes to [address] [place] [address] at the [facility] 3-5 times a week   Chiropractic: none    Modalities: none   OTC Tylenol: yes with relief   NSAIDS: CAD.   mobic currently with relief, voltaren gel with some relief    Opioids: none    Membrane stabilizers: none   Muscle relaxers: none    Previous injections: bilateral L4-5 and L5-S1 facet injections- 90% relief    Previous surgery at this site: none      No Known Allergies    Current Outpatient Medications   Medication Sig Dispense Refill    ONE TOUCH LANCETS MISC Check blood glucose qam 100 each 12    blood glucose test strips (ONE TOUCH ULTRA TEST) strip Check blood glucose qam 100 each 12    tamsulosin (FLOMAX) 0.4 MG capsule Take 1 capsule by mouth daily 90 capsule 1    Glucos-Chond-Hyal Ac-Ca Fructo (MOVE FREE JOINT HEALTH ADVANCE PO) Take by mouth 3 times daily      Handicap Placard MISC by Does not apply route Unable to ambulate more than 40 feet without difficulty  Expires 2/28/2022 1 each 0    atorvastatin (LIPITOR) 10 MG tablet Take 10 mg by mouth daily       Blood Glucose Monitoring Suppl (ONE TOUCH ULTRA 2) W/DEVICE KIT 1 kit by Does not apply route daily as needed. 1 kit 0    metoprolol (LOPRESSOR) 25 MG tablet Take 0.5 tablets by mouth 2 times daily. 60 tablet 1    aspirin 81 MG tablet Take 81 mg by mouth daily.  multivitamin (THERAGRAN) per tablet Take 1 tablet by mouth daily.  Calcium Carbonate-Vitamin D (CALCIUM + D) 600-200 MG-UNIT TABS Take 600 mg by mouth.  diclofenac sodium (VOLTAREN) 1 % GEL Apply 4 g topically 4 times daily as needed (pain) 480 g 5     No current facility-administered medications for this visit. 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 Tj Whitaker CATH LAB PROCEDURE  3/26/13    FRACTURE SURGERY      NERVE BLOCK Bilateral 2019    lumbar intra-articular facet    TONSILLECTOMY         Family History   Problem Relation Age of Onset    Heart Disease Mother     Heart Disease Father     Heart Disease Brother        Social History     Tobacco Use    Smoking status: Former Smoker     Years: 32.00     Last attempt to quit: 3/26/1987     Years since quittin.8    Smokeless tobacco: Former User   Substance Use Topics    Alcohol use: No     Alcohol/week: 0.0 standard drinks    Drug use: No       Functional Status:    The patient is able to ambulate and perform activities of daily living without the use of an assistive posture      Imaging: (personally reviewed by me 01/20/20)  X-ray L spine      Findings:   AP and lateral views of the lumbar spine were obtained. Mild   multilevel disc space narrowing as well as facet joint narrowing with   subchondral sclerosis and spurring. No fracture.           Impression   Multilevel degenerative disc and degenerative joint   disease. MRI L Spine       T12-L1: Normal T12-L1       L1-L2: Noncompressive bulge and facet hypertrophy. No canal or   foraminal stenosis.       L2-L3: Degenerative disc height loss noted with noncompressive bulge. No central or foraminal stenosis.       L3-L4: Degenerative disc disease noted with noncompressive bulge. Mild   foraminal stenosis noted.       L4-L5: Facet hypertrophy and ligamentum flavum infolding. Small joint   effusion seen with mild disc bulge. Mild lateral recess and moderate   foraminal stenosis noted.       L5-S1: Facet hypertrophy noted with mild foraminal stenosis. No canal   stenosis.       SOFT TISSUES: There is mild increase edema seen in the paraspinal   musculature of the lower lumbar spine with no contusion or facet   abnormality.           Impression       1.  Mild edema in the lower lumbar paraspinal musculature may be   related to muscle strain. 2.  Multilevel degenerative disc disease as discussed. Findings most   severe at L4-5.             Impression:   Missael Servin is a 81 y.o. male     1. Spondylosis of lumbar region without myelopathy or radiculopathy        Plan:   · Patient would benefit from medial branch block bilateral L4-5 and L5-S1 x 2 with intent for RFA. Procedure risks, benefits and alternatives were discussed. Patient would like to proceed. · Continue HEP     The patient was educated about the diagnosis, prognosis, indications, risks and benefits of treatment. An opportunity to ask questions was given to the patient and questions were answered.   The patient agreed to proceed with the recommended treatment as

## 2020-01-20 NOTE — PROGRESS NOTES
atorvastatin (LIPITOR) 10 MG tablet Take 10 mg by mouth daily       Blood Glucose Monitoring Suppl (ONE TOUCH ULTRA 2) W/DEVICE KIT 1 kit by Does not apply route daily as needed. 1 kit 0    metoprolol (LOPRESSOR) 25 MG tablet Take 0.5 tablets by mouth 2 times daily. 60 tablet 1    aspirin 81 MG tablet Take 81 mg by mouth daily.  multivitamin (THERAGRAN) per tablet Take 1 tablet by mouth daily.  Calcium Carbonate-Vitamin D (CALCIUM + D) 600-200 MG-UNIT TABS Take 600 mg by mouth.  diclofenac sodium (VOLTAREN) 1 % GEL Apply 4 g topically 4 times daily as needed (pain) 480 g 5     No current facility-administered medications for this visit. 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 Bailey Frazier CATH LAB PROCEDURE  3/26/13    FRACTURE SURGERY      NERVE BLOCK Bilateral 2019    lumbar intra-articular facet    TONSILLECTOMY         Family History   Problem Relation Age of Onset    Heart Disease Mother     Heart Disease Father     Heart Disease Brother        Social History     Tobacco Use    Smoking status: Former Smoker     Years: 32.00     Last attempt to quit: 3/26/1987     Years since quittin.8    Smokeless tobacco: Former User   Substance Use Topics    Alcohol use: No     Alcohol/week: 0.0 standard drinks    Drug use: No       Functional Status:    The patient is able to ambulate and perform activities of daily living without the use of an assistive device. Occupation: The patient is currently retired. ROS:   Constitutional: Denies fevers, chills, night sweats, unintentional weight loss     Skin: Denies rash or skin changes     Eyes: Denies vision changes    Ears/Nose/Throat: Denies nasal congestion or sore throat     Respiratory: Denies SOB or cough     Cardiovascular: Denies CP, palpitations, edema      Gastrointestinal: Denies abdominal pain,  N/V, constipation, or diarrhea    Genitourinary: Denies urinary symptoms    Neurologic: See HPI.     MSK: See HPI. Psychiatric: Denies sleep disturbance, anxiety, depression    Hematologic/Lymphatic/Immunologic: Denies bruising       Physical Exam:   Blood pressure 138/73, pulse 50, height 5' 9\" (1.753 m), weight 204 lb 1.6 oz (92.6 kg). General: well developed and well nourished in no acute distress. Body habitus is normal  HEENT: No rhinorrhea, sneezing, yawning, or lacrimation. No scleral icterus or conjunctival injection. Resp: symmetrical chest expansion, unlabored breathing, respirations unlabored. CV: Heart rate is regular. Peripheral pulses are palpable  Lymph: No visible regional lymphadenopathy. Skin: No rashes or ecchymosis. Normal turgor. Psych: Mood is calm. Affect is normal.   Ext: No edema noted     MSK:   Back/Hip Exam:   Inspection: Pelvis was asymmetric. Lumbar lordotic curvature was decreased. There was scoliosis present. No ecchymoses or erythema. Palpation: Palpatory exam revealed tenderness along lumbosacral paraspinals, no ttp midline spine, SI joint sulcus, greater trochanters and TFL on both side. There was left lumbar paraspinal spasms. There were no trigger points. Neurological Exam:  Strength:   R  L  Hip Flex  5  5  Knee Ext  5  5  Ankle dorsi  5  5  EHL   5 5  Ankle Plantar  5  5    Sensory:  Intact for light touch in all lower extremity dermatomes.       Reflexes:   R  L  Patellar  (2+) (2+)  Ankle Jerk  (1+) (1+)    Gait is normal. Forward flexed described above.      Follow up after first MBB       Jacquelyn Phan DO [facility]   Board Certified Physical Medicine and Rehabilitation

## 2020-01-21 ENCOUNTER — TELEPHONE (OUTPATIENT)
Dept: PHYSICAL MEDICINE AND REHAB | Age: 85
End: 2020-01-21

## 2020-03-09 ENCOUNTER — OFFICE VISIT (OUTPATIENT)
Dept: FAMILY MEDICINE CLINIC | Age: 85
End: 2020-03-09
Payer: MEDICARE

## 2020-03-09 VITALS
OXYGEN SATURATION: 99 % | HEIGHT: 69 IN | RESPIRATION RATE: 20 BRPM | BODY MASS INDEX: 30.36 KG/M2 | HEART RATE: 60 BPM | SYSTOLIC BLOOD PRESSURE: 118 MMHG | DIASTOLIC BLOOD PRESSURE: 62 MMHG | WEIGHT: 205 LBS

## 2020-03-09 LAB — HBA1C MFR BLD: 6 %

## 2020-03-09 PROCEDURE — G0439 PPPS, SUBSEQ VISIT: HCPCS | Performed by: FAMILY MEDICINE

## 2020-03-09 PROCEDURE — G0444 DEPRESSION SCREEN ANNUAL: HCPCS | Performed by: FAMILY MEDICINE

## 2020-03-09 PROCEDURE — 83036 HEMOGLOBIN GLYCOSYLATED A1C: CPT | Performed by: FAMILY MEDICINE

## 2020-03-09 RX ORDER — SYRING-NEEDL,DISP,INSUL,0.3 ML 30 GX5/16"
SYRINGE, EMPTY DISPOSABLE MISCELLANEOUS
Qty: 1 DEVICE | Refills: 0 | Status: SHIPPED | OUTPATIENT
Start: 2020-03-09

## 2020-03-09 RX ORDER — BLOOD-GLUCOSE METER
EACH MISCELLANEOUS
Qty: 1 KIT | Refills: 0 | Status: SHIPPED | OUTPATIENT
Start: 2020-03-09

## 2020-03-09 RX ORDER — TAMSULOSIN HYDROCHLORIDE 0.4 MG/1
0.4 CAPSULE ORAL DAILY
Qty: 90 CAPSULE | Refills: 1 | Status: SHIPPED
Start: 2020-03-09 | End: 2020-09-01 | Stop reason: SDUPTHER

## 2020-03-09 ASSESSMENT — PATIENT HEALTH QUESTIONNAIRE - PHQ9
SUM OF ALL RESPONSES TO PHQ9 QUESTIONS 1 & 2: 0
SUM OF ALL RESPONSES TO PHQ QUESTIONS 1-9: 0
SUM OF ALL RESPONSES TO PHQ QUESTIONS 1-9: 0
2. FEELING DOWN, DEPRESSED OR HOPELESS: 0
3. TROUBLE FALLING OR STAYING ASLEEP: 0
1. LITTLE INTEREST OR PLEASURE IN DOING THINGS: 0

## 2020-03-09 NOTE — PATIENT INSTRUCTIONS
diet is one that limits sodium , certain types of fat , and cholesterol . This type of diet is recommended for:   People with any form of cardiovascular disease (eg, coronary heart disease , peripheral vascular disease , previous heart attack , previous stroke )   People with risk factors for cardiovascular disease, such as high blood pressure , high cholesterol , or diabetes   Anyone who wants to lower their risk of developing cardiovascular disease   Sodium    Sodium is a mineral found in many foods. In general, most people consume much more sodium than they need. Diets high in sodium can increase blood pressure and lead to edema (water retention). On a heart-healthy diet, you should consume no more than 2,300 mg (milligrams) of sodium per dayabout the amount in one teaspoon of table salt. The foods highest in sodium include table salt (about 50% sodium), processed foods, convenience foods, and preserved foods. Cholesterol    Cholesterol is a fat-like, waxy substance in your blood. Our bodies make some cholesterol. It is also found in animal products, with the highest amounts in fatty meat, egg yolks, whole milk, cheese, shellfish, and organ meats. On a heart-healthy diet, you should limit your cholesterol intake to less than 200 mg per day. It is normal and important to have some cholesterol in your bloodstream. But too much cholesterol can cause plaque to build up within your arteries, which can eventually lead to a heart attack or stroke. The two types of cholesterol that are most commonly referred to are:   Low-density lipoprotein (LDL) cholesterol  Also known as bad cholesterol, this is the cholesterol that tends to build up along your arteries. Bad cholesterol levels are increased by eating fats that are saturated or hydrogenated. Optimal level of this cholesterol is less than 100. Over 130 starts to get risky for heart disease.    High-density lipoprotein (HDL) cholesterol  Also known as good cholesterol, this type of cholesterol actually carries cholesterol away from your arteries and may, therefore, help lower your risk of having a heart attack. You want this level to be high (ideally greater than 60). It is a risk to have a level less than 40. You can raise this good cholesterol by eating olive oil, canola oil, avocados, or nuts. Exercise raises this level, too. Fat    Fat is calorie dense and packs a lot of calories into a small amount of food. Even though fats should be limited due to their high calorie content, not all fats are bad. In fact, some fats are quite healthful. Fat can be broken down into four main types. The good-for-you fats are:   Monounsaturated fat  found in oils such as olive and canola, avocados, and nuts and natural nut butters; can decrease cholesterol levels, while keeping levels of HDL cholesterol high   Polyunsaturated fat  found in oils such as safflower, sunflower, soybean, corn, and sesame; can decrease total cholesterol and LDL cholesterol   Omega-3 fatty acids  particularly those found in fatty fish (such as salmon, trout, tuna, mackerel, herring, and sardines); can decrease risk of arrhythmias, decrease triglyceride levels, and slightly lower blood pressure   The fats that you want to limit are:   Saturated fat  found in animal products, many fast foods, and a few vegetables; increases total blood cholesterol, including LDL levels   Animal fats that are saturated include: butter, lard, whole-milk dairy products, meat fat, and poultry skin   Vegetable fats that are saturated include: hydrogenated shortening, palm oil, coconut oil, cocoa butter   Hydrogenated or trans fat  found in margarine and vegetable shortening, most shelf stable snack foods, and fried foods; increases LDL and decreases HDL     It is generally recommended that you limit your total fat for the day to less than 30% of your total calories.  If you follow an 1800-calorie heart healthy diet, for example, this would mean 60 grams of fat or less per day. Saturated fat and trans fat in your diet raises your blood cholesterol the most, much more than dietary cholesterol does. For this reason, on a heart-healthy diet, less than 7% of your calories should come from saturated fat and ideally 0% from trans fat. On an 1800-calorie diet, this translates into less than 14 grams of saturated fat per day, leaving 46 grams of fat to come from mono- and polyunsaturated fats.    Food Choices on a Heart Healthy Diet   Food Category   Foods Recommended   Foods to Avoid   Grains   Breads and rolls without salted tops Most dry and cooked cereals Unsalted crackers and breadsticks Low-sodium or homemade breadcrumbs or stuffing All rice and pastas   Breads, rolls, and crackers with salted tops High-fat baked goods (eg, muffins, donuts, pastries) Quick breads, self-rising flour, and biscuit mixes Regular bread crumbs Instant hot cereals Commercially prepared rice, pasta, or stuffing mixes   Vegetables   Most fresh, frozen, and low-sodium canned vegetables Low-sodium and salt-free vegetable juices Canned vegetables if unsalted or rinsed   Regular canned vegetables and juices, including sauerkraut and pickled vegetables Frozen vegetables with sauces Commercially prepared potato and vegetable mixes   Fruits   Most fresh, frozen, and canned fruits All fruit juices   Fruits processed with salt or sodium   Milk   Nonfat or low-fat (1%) milk Nonfat or low-fat yogurt Cottage cheese, low-fat ricotta, cheeses labeled as low-fat and low-sodium   Whole milk Reduced-fat (2%) milk Malted and chocolate milk Full fat yogurt Most cheeses (unless low-fat and low salt) Buttermilk (no more than 1 cup per week)   Meats and Beans   Lean cuts of fresh or frozen beef, veal, lamb, or pork (look for the word loin) Fresh or frozen poultry without the skin Fresh or frozen fish and some shellfish Egg whites and egg substitutes (Limit whole eggs to three per week) Tofu Nuts or seeds (unsalted, dry-roasted), low-sodium peanut butter Dried peas, beans, and lentils   Any smoked, cured, salted, or canned meat, fish, or poultry (including inman, chipped beef, cold cuts, hot dogs, sausages, sardines, and anchovies) Poultry skins Breaded and/or fried fish or meats Canned peas, beans, and lentils Salted nuts   Fats and Oils   Olive oil and canola oil Low-sodium, low-fat salad dressings and mayonnaise   Butter, margarine, coconut and palm oils, inman fat   Snacks, Sweets, and Condiments   Low-sodium or unsalted versions of broths, soups, soy sauce, and condiments Pepper, herbs, and spices; vinegar, lemon, or lime juice Low-fat frozen desserts (yogurt, sherbet, fruit bars) Sugar, cocoa powder, honey, syrup, jam, and preserves Low-fat, trans-fat free cookies, cakes, and pies Andres and animal crackers, fig bars, yesica snaps   High-fat desserts Broth, soups, gravies, and sauces, made from instant mixes or other high-sodium ingredients Salted snack foods Canned olives Meat tenderizers, seasoning salt, and most flavored vinegars   Beverages   Low-sodium carbonated beverages Tea and coffee in moderation Soy milk   Commercially softened water   Suggestions   Make whole grains, fruits, and vegetables the base of your diet. Choose heart-healthy fats such as canola, olive, and flaxseed oil, and foods high in heart-healthy fats, such as nuts, seeds, soybeans, tofu, and fish. Eat fish at least twice per week; the fish highest in omega-3 fatty acids and lowest in mercury include salmon, herring, mackerel, sardines, and canned chunk light tuna. If you eat fish less than twice per week or have high triglycerides, talk to your doctor about taking fish oil supplements. Read food labels.    For products low in fat and cholesterol, look for fat free, low-fat, cholesterol free, saturated fat free, and trans fat freeAlso scan the Nutrition Facts Label, which lists saturated fat, trans fat, and cholesterol amounts. For products low in sodium, look for sodium free, very low sodium, low sodium, no added salt, and unsalted   Skip the salt when cooking or at the table; if food needs more flavor, get creative and try out different herbs and spices. Garlic and onion also add substantial flavor to foods. Trim any visible fat off meat and poultry before cooking, and drain the fat off after evangelista. Use cooking methods that require little or no added fat, such as grilling, boiling, baking, poaching, broiling, roasting, steaming, stir-frying, and sauting. Avoid fast food and convenience food. They tend to be high in saturated and trans fat and have a lot of added salt. Talk to a registered dietitian for individualized diet advice. Last Reviewed: March 2011 Kuldip Palmer MS, MPH, RD   Updated: 3/29/2011   ·     Keep Your Memory [place]       Many factors can affect your ability to remembera hectic lifestyle, aging, stress, chronic disease, and certain medicines. But, there are steps you can take to sharpen your mind and help preserve your memory. Challenge Your Brain   Regularly challenging your mind may help keeps it in top shape. Good mental exercises include:   Crossword puzzlesUse a dictionary if you need it; you will learn more that way. Brainteasers Try some! Crafts, such as wood working and sewing   Hobbies, such as gardening and building model airplanes   SocializingVisit old friends or join groups to meet new ones. Reading   Learning a new language   Taking a class, whether it be art history or liliya chi   TravelingExperience the food, history, and culture of your destination   Learning to use a computer   Going to museums, the theater, or thought-provoking movies   Changing things in your daily life, such as reversing your pattern in the grocery store or brushing your teeth using your nondominant hand   Use Memory Aids   There is no need to remember every detail on your own.  These the development of new products to help older adults live safely and independently in spite of age-related changes. Making Life More Livable  , by Mehdi Munoz, lists over 1,000 products for \"living well in the mature years,\" such as bathing and mobility aids, household security devices, ergonomically designed knives and peelers, and faucet valves and knobs for temperature control. Medical supply stores and organizations are good sources of information about products that improve your quality of life and insure your safety. Last Reviewed: November 2009 Eli Sanchez MD   Updated: 3/7/2011     ·        Learning About Living [place]  What is a living will? A living will is a legal form you use to write down the kind of care you want at the end of your life. It is used by the health professionals who will treat you if you aren't able to decide for yourself. If you put your wishes in writing, your loved ones and others will know what kind of care you want. They won't need to guess. This can ease your mind and be helpful to others. A living will is not the same as an estate or property will. An estate will explains what you want to happen with your money and property after you die. Is a living will a legal document? A living will is a legal document. Each state has its own laws about living brown. If you move to another state, make sure that your living will is legal in the state where you now live. Or you might use a universal form that has been approved by many states. This kind of form can sometimes be completed and stored online. Your electronic copy will then be available wherever you have a connection to the Internet. In most cases, doctors will respect your wishes even if you have a form from a different state. You don't need an  to complete a living will.  But legal advice can be helpful if your state's laws are unclear, your health history is complicated, or your family can't agree on what

## 2020-03-09 NOTE — PROGRESS NOTES
tablet Take 0.5 tablets by mouth 2 times daily. Yes Mame Canas MD   aspirin 81 MG tablet Take 81 mg by mouth daily. Yes Historical Provider, MD   multivitamin [facility]) per tablet Take 1 tablet by mouth daily. Yes Historical Provider, MD   diclofenac sodium (VOLTAREN) 1 % GEL Apply 4 g topically 4 times daily as needed (pain)  Gladys Uche-Black, DO   Calcium Carbonate-Vitamin D (CALCIUM + D) 600-200 MG-UNIT TABS Take 600 mg by mouth.   Historical Provider, MD         Past Medical History:   Diagnosis Date    Bundle branch block     CAD (coronary artery disease)     Diabetes mellitus ([facility] [address]     Hyperlipidemia LDL goal < 100 8/24/2015    Spondylosis of lumbar region without myelopathy or radiculopathy 8/28/2018       Past Surgical History:   Procedure Laterality Date    ANESTHESIA NERVE BLOCK Bilateral 2/28/2019    BILATERAL L4-5 L5-S1 INTRA-ARTICULAR FACET STEROID INJECTION (CPT 34117) performed by Jacquelyn Phan DO at [address]  3/27/13    off-pump cabg x 3- Dr. Cindy Serrano CATH LAB PROCEDURE  3/26/13    FRACTURE SURGERY      NERVE BLOCK Bilateral 02/28/2019    lumbar intra-articular facet    TONSILLECTOMY           Family History   Problem Relation Age of Onset    Heart Disease Mother     Heart Disease Father     Heart Disease Brother        CareTeam (Including outside providers/suppliers regularly involved in providing care):   Patient Care Team:  Brian Alvarado MD as PCP - General (Family Medicine)  Brian Alvarado MD as PCP - [facility] Empaneled Provider  Mame Canas MD as Consulting Physician (Cardiothoracic Surgery)  Tata Ayers MD as Consulting Physician (Cardiology)    Wt Readings from Last 3 Encounters:   03/09/20 205 lb (93 kg)   01/20/20 204 lb 1.6 oz (92.6 kg)   09/09/19 201 lb (91.2 kg)     Vitals:    03/09/20 0836   BP: 118/62   Pulse: 60   Resp: 20   SpO2: 99%   Weight: 205 lb (93 kg)   Height: 5' 9\" (1.753 m)     Body mass index is 30.27 kg/m². Based upon direct observation of the patient, evaluation of cognition reveals recent and remote memory intact. Physical Exam  Vitals signs reviewed. Constitutional:       General: He is not in acute distress. Appearance: He is well-developed. Neck:      Musculoskeletal: Neck supple. Vascular: No carotid bruit. Cardiovascular:      Rate and Rhythm: Normal rate and regular rhythm. Heart sounds: Normal heart sounds. No murmur. No gallop. Pulmonary:      Effort: Pulmonary effort is normal.      Breath sounds: Normal breath sounds. No wheezing or rales. Abdominal:      General: Bowel sounds are normal. There is no distension. Palpations: Abdomen is soft. Tenderness: There is no abdominal tenderness. Skin:     General: Skin is warm and dry. Neurological:      Mental Status: He is alert and oriented to person, place, and time. Patient's complete Health Risk Assessment and screening values have been reviewed and are found in Flowsheets. The following problems were reviewed today and where indicated follow up appointments were made and/or referrals ordered. Positive Risk Factor Screenings with Interventions:     Health Habits/Nutrition:  Health Habits/Nutrition  Do you exercise for at least 20 minutes 2-3 times per week?: Yes  Have you lost any weight without trying in the past 3 months?: No  Do you eat fewer than 2 meals per day?: No  Have you seen a dentist within the past year?: (!) No  Body mass index is 30.27 kg/m².   Health Habits/Nutrition Interventions:  · Dental exam overdue:  patient declines dental evaluation    Hearing/Vision:  No exam data present  Hearing/Vision  Do you or your family notice any trouble with your hearing?: (!) Yes  Do you have difficulty driving, watching TV, or doing any of your daily activities because of your eyesight?: (!) Yes  Have you had an eye exam within the past year?: Yes  Hearing/Vision Interventions:  · Hearing concerns:  uses hearing aides    Safety:  Safety  Do you have working smoke detectors?: Yes  Have all throw rugs been removed or fastened?: (!) No  Do you have non-slip mats or surfaces in all bathtubs/showers?: (!) No  Do all of your stairways have a railing or banister?: Yes  Are your doorways, halls and stairs free of clutter?: (!) No  Do you always fasten your seatbelt when you are in a car?: Yes  Safety Interventions:  · Home safety tips provided    Personalized Preventive Plan   Current Health Maintenance Status  Immunization History   Administered Date(s) Administered    Influenza, High Dose (Fluzone 65 yrs and older) 10/11/2017, 11/28/2018    Influenza, Triv, 3 Years and older, IM (Afluria (5 yrs and older) 12/13/2016    Influenza, Triv, inactivated, subunit, adjuvanted, IM (Fluad 65 yrs and older) 09/09/2019    Pneumococcal Conjugate 13-valent ([id number]) 08/28/2017    Pneumococcal Polysaccharide ([id number]) 08/24/2015    Tdap (Boostrix, Adacel) 03/02/2018        Health Maintenance   Topic Date Due    Hepatitis B vaccine (1 of 3 - Risk 3-dose series) 10/31/1954    Shingles Vaccine (1 of 2) 10/31/1986    Annual Wellness Visit (AWV)  05/29/2019    Diabetic retinal exam  06/02/2019    Diabetic foot exam  03/04/2020    A1C test (Diabetic or Prediabetic)  03/09/2020    Lipid screen  12/09/2020    DTaP/Tdap/Td vaccine (2 - Td) 03/02/2028    Flu vaccine  Completed    Pneumococcal 65+ years Vaccine  Completed    Hepatitis A vaccine  Aged Out    Hib vaccine  Aged Out    Meningococcal (ACWY) vaccine  Aged Out     Recommendations for Et3arraf Due: see orders and patient instructions/AVS.  . Recommended screening schedule for the next 5-10 years is provided to the patient in written form: see Patient Instructions/AVS.    Anil Jimenez was seen today for medicare awv.     Diagnoses and all orders for this visit:    Routine general medical examination at a health care facility    Type 2 diabetes mellitus without complication, without long-term current use of insulin (HCC)  -     Lancet Device MISC; Use daily for glucose monitoring  -     Cancel: POCT glycosylated hemoglobin (Hb A1C)  -     POCT glycosylated hemoglobin (Hb A1C)  -     Blood Glucose Monitoring Suppl (ONE TOUCH ULTRA 2) w/Device KIT; Use daily for glucose monitoring    Impacted cerumen of right ear  -     carbamide peroxide (DEBROX) 6.5 % otic solution; Place 5 drops in ear(s) 2 times daily

## 2020-09-01 ENCOUNTER — OFFICE VISIT (OUTPATIENT)
Dept: FAMILY MEDICINE CLINIC | Age: 85
End: 2020-09-01
Payer: MEDICARE

## 2020-09-01 VITALS
RESPIRATION RATE: 20 BRPM | WEIGHT: 205 LBS | OXYGEN SATURATION: 98 % | HEART RATE: 57 BPM | SYSTOLIC BLOOD PRESSURE: 118 MMHG | HEIGHT: 69 IN | DIASTOLIC BLOOD PRESSURE: 68 MMHG | BODY MASS INDEX: 30.36 KG/M2

## 2020-09-01 LAB — HBA1C MFR BLD: 6 %

## 2020-09-01 PROCEDURE — 83036 HEMOGLOBIN GLYCOSYLATED A1C: CPT | Performed by: FAMILY MEDICINE

## 2020-09-01 PROCEDURE — 99213 OFFICE O/P EST LOW 20 MIN: CPT | Performed by: FAMILY MEDICINE

## 2020-09-01 RX ORDER — TAMSULOSIN HYDROCHLORIDE 0.4 MG/1
0.4 CAPSULE ORAL DAILY
Qty: 90 CAPSULE | Refills: 1 | Status: SHIPPED
Start: 2020-09-01 | End: 2021-03-01 | Stop reason: SDUPTHER

## 2020-09-01 RX ORDER — LANCETS
1 EACH MISCELLANEOUS DAILY
Qty: 100 EACH | Refills: 12 | Status: SHIPPED
Start: 2020-09-01 | End: 2021-09-02 | Stop reason: SDUPTHER

## 2020-09-01 RX ORDER — DOCUSATE SODIUM 100 MG/1
100 CAPSULE, LIQUID FILLED ORAL DAILY
COMMUNITY

## 2020-09-01 ASSESSMENT — PATIENT HEALTH QUESTIONNAIRE - PHQ9
2. FEELING DOWN, DEPRESSED OR HOPELESS: 0
SUM OF ALL RESPONSES TO PHQ9 QUESTIONS 1 & 2: 0
SUM OF ALL RESPONSES TO PHQ QUESTIONS 1-9: 0
SUM OF ALL RESPONSES TO PHQ QUESTIONS 1-9: 0
1. LITTLE INTEREST OR PLEASURE IN DOING THINGS: 0

## 2020-09-01 ASSESSMENT — ENCOUNTER SYMPTOMS
VOMITING: 0
SHORTNESS OF BREATH: 0
NAUSEA: 0
DIARRHEA: 0

## 2020-09-01 NOTE — PROGRESS NOTES
OFFICE PROGRESS NOTE      SUBJECTIVE:        Patient ID:   Lucas Madrid is a 81 y.o. male who presents for   Chief Complaint   Patient presents with    Diabetes     needs new rx lancets would not let me order           HPI:   Patient is here to follow up on diabetes. Fasting blood sugars: Midday blood sugars: not checking. Patient checks blood glucose 1 times per day. Patient is following diabetic diet. Patient is a nonsmoker. Last ophthalmology visit: 6/2020. Patient is taking a daily statin. Patient doing well on current regimen for CAD. Patient doing well on current regimen for BPH. Prior to Admission medications    Medication Sig Start Date End Date Taking? Authorizing Provider   tamsulosin (FLOMAX) 0.4 MG capsule Take 1 capsule by mouth daily 9/1/20  Yes Jaime Umaña MD   blood glucose test strips (ONE TOUCH ULTRA TEST) strip Check blood glucose qam 9/1/20  Yes Jaime Umaña MD   docusate sodium (COLACE) 100 MG capsule Take 100 mg by mouth daily   Yes Historical Provider, MD   ONE TOUCH ULTRASOFT LANCETS [address] 1 each by Does not apply route daily 9/1/20  Yes Jaime Umaña MD   Lancet Device MISC Use daily for glucose monitoring 3/9/20  Yes Jaime Umaña MD   Blood Glucose Monitoring Suppl (ONE TOUCH ULTRA 2) w/Device KIT Use daily for glucose monitoring 3/9/20  Yes Jaime Umaña MD   [facility] LANCETS MISC Check blood glucose qam 9/16/19  Yes Jaime Umaña MD   Handicap Placard MISC by Does not apply route Unable to ambulate more than 40 feet without difficulty  Expires 2/28/2022 2/27/17  Yes Jaime Umaña MD   atorvastatin (LIPITOR) 10 MG tablet Take 10 mg by mouth daily  6/12/15  Yes Historical Provider, MD   metoprolol (LOPRESSOR) 25 MG tablet Take 0.5 tablets by mouth 2 times daily. 3/30/13  Yes Laly Staples MD   aspirin 81 MG tablet Take 81 mg by mouth daily.    Yes Historical Provider, MD multivitamin (THERAGRAN) per tablet Take 1 tablet by mouth daily. Yes Historical Provider, MD   Calcium Carbonate-Vitamin D (CALCIUM + D) 600-200 MG-UNIT TABS Take 600 mg by mouth.    Yes Historical Provider, MD   diclofenac sodium (VOLTAREN) 1 % GEL Apply 4 g topically 4 times daily as needed (pain) 4/3/19 5/3/19  Son Triplett DO     Social History     Socioeconomic History    Marital status:      Spouse name: None    Number of children: None    Years of education: None    Highest education level: None   Occupational History    None   Social Needs    Financial resource strain: None    Food insecurity     Worry: None     Inability: None    Transportation needs     Medical: None     Non-medical: None   Tobacco Use    Smoking status: Former Smoker     Years: 32.00     Last attempt to quit: 3/26/1987     Years since quittin.4    Smokeless tobacco: Former User   Substance and Sexual Activity    Alcohol use: No     Alcohol/week: 0.0 standard drinks    Drug use: No    Sexual activity: Yes     Partners: Female   Lifestyle    Physical activity     Days per week: None     Minutes per session: None    Stress: None   Relationships    Social connections     Talks on phone: None     Gets together: None     Attends Anabaptism service: None     Active member of club or organization: None     Attends meetings of clubs or organizations: None     Relationship status: None    Intimate partner violence     Fear of current or ex partner: None     Emotionally abused: None     Physically abused: None     Forced sexual activity: None   Other Topics Concern    None   Social History Narrative    None       I have reviewed Hernando's allergies, medications, problem list, medical, social and family history and have updated as needed in the electronic medical record    Current Outpatient Medications   Medication Sig Dispense Refill    tamsulosin (FLOMAX) 0.4 MG capsule Take 1 capsule by mouth daily 90 capsule 1    blood glucose test strips (ONE TOUCH ULTRA TEST) strip Check blood glucose qam 100 each 12    docusate sodium (COLACE) 100 MG capsule Take 100 mg by mouth daily      ONE TOUCH ULTRASOFT LANCETS MISC 1 each by Does not apply route daily 100 each 12    Lancet Device MISC Use daily for glucose monitoring 1 Device 0    Blood Glucose Monitoring Suppl (ONE TOUCH ULTRA 2) w/Device KIT Use daily for glucose monitoring 1 kit 0    ONE TOUCH LANCETS MISC Check blood glucose qam 100 each 12    Handicap Placard MISC by Does not apply route Unable to ambulate more than 40 feet without difficulty  Expires 2/28/2022 1 each 0    atorvastatin (LIPITOR) 10 MG tablet Take 10 mg by mouth daily       metoprolol (LOPRESSOR) 25 MG tablet Take 0.5 tablets by mouth 2 times daily. 60 tablet 1    aspirin 81 MG tablet Take 81 mg by mouth daily.  multivitamin (THERAGRAN) per tablet Take 1 tablet by mouth daily.  Calcium Carbonate-Vitamin D (CALCIUM + D) 600-200 MG-UNIT TABS Take 600 mg by mouth.  diclofenac sodium (VOLTAREN) 1 % GEL Apply 4 g topically 4 times daily as needed (pain) 480 g 5     No current facility-administered medications for this visit. Review Of Systems:    Review of Systems   Eyes: Negative for visual disturbance. Respiratory: Negative for shortness of breath. Cardiovascular: Negative for chest pain, palpitations and leg swelling. Gastrointestinal: Negative for diarrhea, nausea and vomiting. Genitourinary: Negative for difficulty urinating, dysuria and frequency. Skin: Negative for rash. Psychiatric/Behavioral: Negative for dysphoric mood. OBJECTIVE:     VS:  Wt Readings from Last 3 Encounters:   09/01/20 205 lb (93 kg)   03/09/20 205 lb (93 kg)   01/20/20 204 lb 1.6 oz (92.6 kg)     Vitals:    09/01/20 1017   BP: 118/68   Pulse: 57   Resp: 20   SpO2: 98%       Physical Exam  Vitals signs reviewed.    Constitutional:       General: He is not in acute distress. Appearance: He is well-developed. Neck:      Musculoskeletal: Neck supple. Vascular: No carotid bruit. Cardiovascular:      Rate and Rhythm: Normal rate and regular rhythm. Heart sounds: Normal heart sounds. No murmur. No gallop. Pulmonary:      Effort: Pulmonary effort is normal.      Breath sounds: Normal breath sounds. No wheezing or rales. Abdominal:      General: Bowel sounds are normal. There is no distension. Palpations: Abdomen is soft. Tenderness: There is no abdominal tenderness. Musculoskeletal:      Right lower leg: No edema. Left lower leg: No edema. Skin:     General: Skin is warm and dry. Neurological:      Mental Status: He is alert and oriented to person, place, and time. Results for orders placed or performed in visit on 09/01/20   POCT glycosylated hemoglobin (Hb A1C)   Result Value Ref Range    Hemoglobin A1C 6.0 %         Aby Colón was seen today for diabetes. Diagnoses and all orders for this visit:    Type 2 diabetes mellitus without complication, without long-term current use of insulin ([facility])  -     blood glucose test strips (ONE TOUCH ULTRA TEST) strip; Check blood glucose qam  -     POCT glycosylated hemoglobin (Hb A1C)  -     ONE TOUCH ULTRASOFT LANCETS MISC; 1 each by Does not apply route daily  -     CBC; Future  -     Comprehensive Metabolic Panel; Future  -     Lipid Panel; Future  -     TSH without Reflex; Future    Coronary artery disease involving native coronary artery of native heart without angina pectoris        -     Continue cardiac regimen    BPH with obstruction/lower urinary tract symptoms  -     tamsulosin (FLOMAX) 0.4 MG capsule; Take 1 capsule by mouth daily        BMI was elevated today, and weight loss plan recommended is : conventional weight loss. Phone/MyChart follow up if tests abnormal.    Return in about 6 months (around 3/1/2021) for Annual Medicare Wellness Visit--30 minutes.                  I have reviewed my findings and recommendations with Mati Goodrich.     Rosario Cummins M.D

## 2020-09-01 NOTE — PATIENT INSTRUCTIONS

## 2020-09-14 ENCOUNTER — HOSPITAL ENCOUNTER (OUTPATIENT)
Age: 85
Discharge: HOME | End: 2020-09-16
Payer: MEDICARE

## 2020-09-14 LAB
ALBUMIN SERPL-MCNC: 4.2 G/DL (ref 3.5–5.2)
ALP BLD-CCNC: 66 U/L (ref 40–129)
ALT SERPL-CCNC: 14 U/L (ref 0–40)
ANION GAP SERPL CALCULATED.3IONS-SCNC: 17 MMOL/L (ref 7–16)
AST SERPL-CCNC: 19 U/L (ref 0–39)
BILIRUB SERPL-MCNC: 0.4 MG/DL (ref 0–1.2)
BUN BLDV-MCNC: 17 MG/DL (ref 8–23)
CALCIUM SERPL-MCNC: 9.7 MG/DL (ref 8.6–10.2)
CHLORIDE BLD-SCNC: 105 MMOL/L (ref 98–107)
CHOLESTEROL, TOTAL: 141 MG/DL (ref 0–199)
CO2: 21 MMOL/L (ref 22–29)
CREAT SERPL-MCNC: 1.2 MG/DL (ref 0.7–1.2)
GFR AFRICAN AMERICAN: >60
GFR NON-AFRICAN AMERICAN: 58 ML/MIN/1.73
GLUCOSE BLD-MCNC: 112 MG/DL (ref 74–99)
HCT VFR BLD CALC: 44.1 % (ref 37–54)
HDLC SERPL-MCNC: 40 MG/DL
HEMOGLOBIN: 14.1 G/DL (ref 12.5–16.5)
LDL CHOLESTEROL CALCULATED: 78 MG/DL (ref 0–99)
MCH RBC QN AUTO: 31.5 PG (ref 26–35)
MCHC RBC AUTO-ENTMCNC: 32 % (ref 32–34.5)
MCV RBC AUTO: 98.7 FL (ref 80–99.9)
PDW BLD-RTO: 14.9 FL (ref 11.5–15)
PLATELET # BLD: 202 E9/L (ref 130–450)
PMV BLD AUTO: 9.1 FL (ref 7–12)
POTASSIUM SERPL-SCNC: 5.2 MMOL/L (ref 3.5–5)
RBC # BLD: 4.47 E12/L (ref 3.8–5.8)
SODIUM BLD-SCNC: 143 MMOL/L (ref 132–146)
TOTAL PROTEIN: 7.1 G/DL (ref 6.4–8.3)
TRIGL SERPL-MCNC: 114 MG/DL (ref 0–149)
TSH SERPL DL<=0.05 MIU/L-ACNC: 6.02 UIU/ML (ref 0.27–4.2)
VLDLC SERPL CALC-MCNC: 23 MG/DL
WBC # BLD: 5.7 E9/L (ref 4.5–11.5)

## 2020-09-14 PROCEDURE — 85027 COMPLETE CBC AUTOMATED: CPT

## 2020-09-14 PROCEDURE — 84443 ASSAY THYROID STIM HORMONE: CPT

## 2020-09-14 PROCEDURE — 80053 COMPREHEN METABOLIC PANEL: CPT

## 2020-09-14 PROCEDURE — 80061 LIPID PANEL: CPT

## 2020-09-14 PROCEDURE — 36415 COLL VENOUS BLD VENIPUNCTURE: CPT

## 2020-09-20 NOTE — RESULT ENCOUNTER NOTE
Please let patient know that thyroid labs were abnormal. Will need additional thyroid labs (Free T4) drawn.  Orders in [place]

## 2020-10-01 ENCOUNTER — HOSPITAL ENCOUNTER (OUTPATIENT)
Age: 85
Discharge: HOME | End: 2020-10-03
Payer: MEDICARE

## 2020-10-01 LAB — T4 FREE: 0.87 NG/DL (ref 0.93–1.7)

## 2020-10-01 PROCEDURE — 84439 ASSAY OF FREE THYROXINE: CPT

## 2020-10-01 PROCEDURE — 36415 COLL VENOUS BLD VENIPUNCTURE: CPT

## 2020-10-04 RX ORDER — LEVOTHYROXINE SODIUM 0.05 MG/1
50 TABLET ORAL DAILY
Qty: 30 TABLET | Refills: 3 | Status: SHIPPED
Start: 2020-10-04 | End: 2021-02-03

## 2020-11-03 PROBLEM — M47.816 LUMBAR SPONDYLOSIS: Status: RESOLVED | Noted: 2018-11-26 | Resolved: 2020-11-03

## 2021-03-01 ENCOUNTER — IMMUNIZATION (OUTPATIENT)
Dept: PRIMARY CARE CLINIC | Age: 86
End: 2021-03-01
Payer: MEDICARE

## 2021-03-01 ENCOUNTER — OFFICE VISIT (OUTPATIENT)
Dept: FAMILY MEDICINE CLINIC | Age: 86
End: 2021-03-01
Payer: MEDICARE

## 2021-03-01 VITALS
BODY MASS INDEX: 31.25 KG/M2 | DIASTOLIC BLOOD PRESSURE: 64 MMHG | WEIGHT: 211 LBS | HEIGHT: 69 IN | HEART RATE: 63 BPM | SYSTOLIC BLOOD PRESSURE: 124 MMHG | OXYGEN SATURATION: 99 % | RESPIRATION RATE: 20 BRPM

## 2021-03-01 DIAGNOSIS — E11.9 TYPE 2 DIABETES MELLITUS WITHOUT COMPLICATION, WITHOUT LONG-TERM CURRENT USE OF INSULIN (HCC): ICD-10-CM

## 2021-03-01 DIAGNOSIS — E03.9 ACQUIRED HYPOTHYROIDISM: ICD-10-CM

## 2021-03-01 DIAGNOSIS — M47.816 SPONDYLOSIS OF LUMBAR REGION WITHOUT MYELOPATHY OR RADICULOPATHY: ICD-10-CM

## 2021-03-01 DIAGNOSIS — Z00.00 ROUTINE GENERAL MEDICAL EXAMINATION AT A HEALTH CARE FACILITY: Primary | ICD-10-CM

## 2021-03-01 LAB
HBA1C MFR BLD: 5.8 %
TSH SERPL DL<=0.05 MIU/L-ACNC: 2.52 UIU/ML (ref 0.27–4.2)

## 2021-03-01 PROCEDURE — 83036 HEMOGLOBIN GLYCOSYLATED A1C: CPT | Performed by: FAMILY MEDICINE

## 2021-03-01 PROCEDURE — G0439 PPPS, SUBSEQ VISIT: HCPCS | Performed by: FAMILY MEDICINE

## 2021-03-01 PROCEDURE — 0011A COVID-19, MODERNA VACCINE 100MCG/0.5ML DOSE: CPT | Performed by: NURSE PRACTITIONER

## 2021-03-01 PROCEDURE — 91301 COVID-19, MODERNA VACCINE 100MCG/0.5ML DOSE: CPT | Performed by: NURSE PRACTITIONER

## 2021-03-01 RX ORDER — TAMSULOSIN HYDROCHLORIDE 0.4 MG/1
0.4 CAPSULE ORAL DAILY
Qty: 90 CAPSULE | Refills: 1 | Status: SHIPPED
Start: 2021-03-01 | End: 2021-09-02 | Stop reason: SDUPTHER

## 2021-03-01 RX ORDER — GABAPENTIN 100 MG/1
100 CAPSULE ORAL 2 TIMES DAILY
Qty: 60 CAPSULE | Refills: 2 | Status: SHIPPED
Start: 2021-03-01 | End: 2021-05-24

## 2021-03-01 ASSESSMENT — LIFESTYLE VARIABLES: HOW OFTEN DO YOU HAVE A DRINK CONTAINING ALCOHOL: 0

## 2021-03-01 ASSESSMENT — PATIENT HEALTH QUESTIONNAIRE - PHQ9
2. FEELING DOWN, DEPRESSED OR HOPELESS: 0
1. LITTLE INTEREST OR PLEASURE IN DOING THINGS: 0
SUM OF ALL RESPONSES TO PHQ9 QUESTIONS 1 & 2: 0
SUM OF ALL RESPONSES TO PHQ QUESTIONS 1-9: 0

## 2021-03-01 NOTE — PATIENT INSTRUCTIONS
A heart-healthy diet is one that limits sodium , certain types of fat , and cholesterol . This type of diet is recommended for:   People with any form of cardiovascular disease (eg, coronary heart disease , peripheral vascular disease , previous heart attack , previous stroke )   People with risk factors for cardiovascular disease, such as high blood pressure , high cholesterol , or diabetes   Anyone who wants to lower their risk of developing cardiovascular disease   Sodium    Sodium is a mineral found in many foods. In general, most people consume much more sodium than they need. Diets high in sodium can increase blood pressure and lead to edema (water retention). On a heart-healthy diet, you should consume no more than 2,300 mg (milligrams) of sodium per dayabout the amount in one teaspoon of table salt. The foods highest in sodium include table salt (about 50% sodium), processed foods, convenience foods, and preserved foods. Cholesterol    Cholesterol is a fat-like, waxy substance in your blood. Our bodies make some cholesterol. It is also found in animal products, with the highest amounts in fatty meat, egg yolks, whole milk, cheese, shellfish, and organ meats. On a heart-healthy diet, you should limit your cholesterol intake to less than 200 mg per day. It is normal and important to have some cholesterol in your bloodstream. But too much cholesterol can cause plaque to build up within your arteries, which can eventually lead to a heart attack or stroke. The two types of cholesterol that are most commonly referred to are:   Low-density lipoprotein (LDL) cholesterol  Also known as bad cholesterol, this is the cholesterol that tends to build up along your arteries. Bad cholesterol levels are increased by eating fats that are saturated or hydrogenated. Optimal level of this cholesterol is less than 100. Over 130 starts to get risky for heart disease. High-density lipoprotein (HDL) cholesterol  Also known as good cholesterol, this type of cholesterol actually carries cholesterol away from your arteries and may, therefore, help lower your risk of having a heart attack. You want this level to be high (ideally greater than 60). It is a risk to have a level less than 40. You can raise this good cholesterol by eating olive oil, canola oil, avocados, or nuts. Exercise raises this level, too. Fat    Fat is calorie dense and packs a lot of calories into a small amount of food. Even though fats should be limited due to their high calorie content, not all fats are bad. In fact, some fats are quite healthful. Fat can be broken down into four main types.    The good-for-you fats are:   Monounsaturated fat  found in oils such as olive and canola, avocados, and nuts and natural nut butters; can decrease cholesterol levels, while keeping levels of HDL cholesterol high   Polyunsaturated fat  found in oils such as safflower, sunflower, soybean, corn, and sesame; can decrease total cholesterol and LDL cholesterol   Omega-3 fatty acids  particularly those found in fatty fish (such as salmon, trout, tuna, mackerel, herring, and sardines); can decrease risk of arrhythmias, decrease triglyceride levels, and slightly lower blood pressure   The fats that you want to limit are:   Saturated fat  found in animal products, many fast foods, and a few vegetables; increases total blood cholesterol, including LDL levels   Animal fats that are saturated include: butter, lard, whole-milk dairy products, meat fat, and poultry skin   Vegetable fats that are saturated include: hydrogenated shortening, palm oil, coconut oil, cocoa butter   Hydrogenated or trans fat  found in margarine and vegetable shortening, most shelf stable snack foods, and fried foods; increases LDL and decreases HDL It is generally recommended that you limit your total fat for the day to less than 30% of your total calories. If you follow an 1800-calorie heart healthy diet, for example, this would mean 60 grams of fat or less per day. Saturated fat and trans fat in your diet raises your blood cholesterol the most, much more than dietary cholesterol does. For this reason, on a heart-healthy diet, less than 7% of your calories should come from saturated fat and ideally 0% from trans fat. On an 1800-calorie diet, this translates into less than 14 grams of saturated fat per day, leaving 46 grams of fat to come from mono- and polyunsaturated fats.    Food Choices on a Heart Healthy Diet   Food Category   Foods Recommended   Foods to Avoid   Grains   Breads and rolls without salted tops Most dry and cooked cereals Unsalted crackers and breadsticks Low-sodium or homemade breadcrumbs or stuffing All rice and pastas   Breads, rolls, and crackers with salted tops High-fat baked goods (eg, muffins, donuts, pastries) Quick breads, self-rising flour, and biscuit mixes Regular bread crumbs Instant hot cereals Commercially prepared rice, pasta, or stuffing mixes   Vegetables   Most fresh, frozen, and low-sodium canned vegetables Low-sodium and salt-free vegetable juices Canned vegetables if unsalted or rinsed   Regular canned vegetables and juices, including sauerkraut and pickled vegetables Frozen vegetables with sauces Commercially prepared potato and vegetable mixes   Fruits   Most fresh, frozen, and canned fruits All fruit juices   Fruits processed with salt or sodium   Milk   Nonfat or low-fat (1%) milk Nonfat or low-fat yogurt Cottage cheese, low-fat ricotta, cheeses labeled as low-fat and low-sodium   Whole milk Reduced-fat (2%) milk Malted and chocolate milk Full fat yogurt Most cheeses (unless low-fat and low salt) Buttermilk (no more than 1 cup per week)   Meats and Beans Lean cuts of fresh or frozen beef, veal, lamb, or pork (look for the word loin) Fresh or frozen poultry without the skin Fresh or frozen fish and some shellfish Egg whites and egg substitutes (Limit whole eggs to three per week) Tofu Nuts or seeds (unsalted, dry-roasted), low-sodium peanut butter Dried peas, beans, and lentils   Any smoked, cured, salted, or canned meat, fish, or poultry (including inman, chipped beef, cold cuts, hot dogs, sausages, sardines, and anchovies) Poultry skins Breaded and/or fried fish or meats Canned peas, beans, and lentils Salted nuts   Fats and Oils   Olive oil and canola oil Low-sodium, low-fat salad dressings and mayonnaise   Butter, margarine, coconut and palm oils, inman fat   Snacks, Sweets, and Condiments   Low-sodium or unsalted versions of broths, soups, soy sauce, and condiments Pepper, herbs, and spices; vinegar, lemon, or lime juice Low-fat frozen desserts (yogurt, sherbet, fruit bars) Sugar, cocoa powder, honey, syrup, jam, and preserves Low-fat, trans-fat free cookies, cakes, and pies Andres and animal crackers, fig bars, yesica snaps   High-fat desserts Broth, soups, gravies, and sauces, made from instant mixes or other high-sodium ingredients Salted snack foods Canned olives Meat tenderizers, seasoning salt, and most flavored vinegars   Beverages   Low-sodium carbonated beverages Tea and coffee in moderation Soy milk   Commercially softened water   Suggestions   Make whole grains, fruits, and vegetables the base of your diet. Choose heart-healthy fats such as canola, olive, and flaxseed oil, and foods high in heart-healthy fats, such as nuts, seeds, soybeans, tofu, and fish. Eat fish at least twice per week; the fish highest in omega-3 fatty acids and lowest in mercury include salmon, herring, mackerel, sardines, and canned chunk light tuna. If you eat fish less than twice per week or have high triglycerides, talk to your doctor about taking fish oil supplements. Read food labels. For products low in fat and cholesterol, look for fat free, low-fat, cholesterol free, saturated fat free, and trans fat freeAlso scan the Nutrition Facts Label, which lists saturated fat, trans fat, and cholesterol amounts. For products low in sodium, look for sodium free, very low sodium, low sodium, no added salt, and unsalted   Skip the salt when cooking or at the table; if food needs more flavor, get creative and try out different herbs and spices. Garlic and onion also add substantial flavor to foods. Trim any visible fat off meat and poultry before cooking, and drain the fat off after evangelista. Use cooking methods that require little or no added fat, such as grilling, boiling, baking, poaching, broiling, roasting, steaming, stir-frying, and sauting. Avoid fast food and convenience food. They tend to be high in saturated and trans fat and have a lot of added salt. Talk to a registered dietitian for individualized diet advice. Last Reviewed: March 2011 Sunita Arreguin MS, MPH, RD   Updated: 3/29/2011   ·     Keeping Home a [facility]       As we get older, changes in balance, gait, strength, vision, hearing, and cognition make even the most youthful senior more prone to accidents. Falls are one of the leading health risks for older people.  This increased risk of falling is related to:   Aging process (eg, decreased muscle strength, slowed reflexes)   Higher incidence of chronic health problems (eg, arthritis, diabetes) that may limit mobility, agility or sensory awareness Side effects of medicine (eg, dizziness, blurred vision)especially medicines like prescription pain medicines and drugs used to treat mental health conditions   Depending on the brittleness of your bones, the consequences of a fall can be serious and long lasting. Home Life   Research by the Association of Aging [facility]) shows that some home accidents among older adults can be prevented by making simple lifestyle changes and basic modifications and repairs to the home environment. Here are some lifestyle changes that experts recommend:   Have your hearing and vision checked regularly. Be sure to wear prescription glasses that are right for you. Speak to your doctor or pharmacist about the possible side effects of your medicines. A number of medicines can cause dizziness. If you have problems with sleep, talk to your doctor. Limit your intake of alcohol. If necessary, use a cane or walker to help maintain your balance. Wear supportive, rubber-soled shoes, even at home. If you live in a region that gets wintry weather, you may want to put special cleats on your shoes to prevent you from slipping on the snow and ice. Exercise regularly to help maintain muscle tone, agility, and balance. Always hold the banister when going up or down stairs. Also, use  bars when getting in or out of the bath or shower, or using the toilet. To avoid dizziness, get up slowly from a lying down position. Sit up first, dangling your legs for a minute or two before rising to a standing position. Overall Home Safety Check   According to the Consumer Product Safety Commision's \"Older Consumer Home Safety Checklist,\" it is important to check for potential hazards in each room. And remember, proper lighting is an essential factor in home safety. If you cannot see clearly, you are more likely to fall.    Important questions to ask yourself include: Are lamp, electric, extension, and telephone cords placed out of the flow of traffic and maintained in good condition? Have frayed cords been replaced? Are all small rugs and runners slip resistant? If not, you can secure them to the floor with a special double-sided carpet tape. Are smoke detectors properly locatedone on every floor of your home and one outside of every sleeping area? Are they in good working order? Are batteries replaced at least once a year? Do you have a well-maintained carbon monoxide detector outside every sleeping are in your home? Does your furniture layout leave plenty of space to maneuver between and around chairs, tables, beds, and sofas? Are hallways, stairs and passages between rooms well lit? Can you reach a lamp without getting out of bed? Are floor surfaces well maintained? Shag rugs, high-pile carpeting, tile floors, and polished wood floors can be particularly slippery. Stairs should always have handrails and be carpeted or fitted with a non-skid tread. Is your telephone easily reachable. Is the cord safely tucked away? Room by Room   According to the Association of Aging, bathrooms and jennifer are the two most potentially hazardous rooms in your home. In the Kitchen    Be sure your stove is in proper working order and always make sure burners and the oven are off before you go out or go to sleep. Keep pots on the back burners, turn handles away from the front of the stove, and keep stove clean and free of grease build-up. Kitchen ventilation systems and range exhausts should be working properly. Keep flammable objects such as towels and pot holders away from the cooking area except when in use. Make sure kitchen curtains are tied back. Move cords and appliances away from the sink and hot surfaces. If extension cords are needed, install wiring guides so they do not hang over the sink, range, or working areas. Look for coffee pots, kettles and toaster ovens with automatic shut-offs. Keep a mop handy in the kitchen so you can wipe up spills instantly. You should also have a small fire extinguisher. Arrange your kitchen with frequently used items on lower shelves to avoid the need to stand on a stepstool to reach them. Make sure countertops are well-lit to avoid injuries while cutting and preparing food. In the Bathroom    Use a non-slip mat or decals in the tub and shower, since wet, soapy tile or porcelain surfaces are extremely slippery. Make sure bathroom rugs are non-skid or tape them firmly to the floor. Bathtubs should have at least one, preferably two, grab bars, firmly attached to structural supports in the wall. (Do not use built-in soap holders or glass shower doors as grab bars.)    Tub seats fitted with non-slip material on the legs allow you to wash sitting down. For people with limited mobility, bathtub transfer benches allow you to slide safely into the tub. Raised toilet seats and toilet safety rails are helpful for those with knee or hip problems. In the [facility]    Make sure you use a nightlight and that the area around your bed is clear of potential obstacles. Be careful with electric blankets and never go to sleep with a heating pad, which can cause serious burns even if on a low setting. Use fire-resistant mattress covers and pillows, and NEVER smoke in bed. Keep a phone next to the bed that is programmed to dial 911 at the push of a button. If you have a chronic condition, you may want to sign on with an automatic call-in service. Typically the system includes a small pendant that connects directly to an emergency medical voice-response system. You should also make arrangements to stay in contact with someonefriend, neighbor, family memberon a regular schedule.    Fire Prevention According to the National S.A.F.E. (Smoke Alarms for Every) [address] [facility], senior citizens are one of the two highest risk groups for death and serious injuries due to residential fires. When cooking, wear short-sleeved items, never a bulky long-sleeved robe. The [facility]'s Safety Checklist for Older Consumers emphasizes the importance of checking basements, garages, workshops and storage areas for fire hazards, such as volatile liquids, piles of old rags or clothing and overloaded circuits. Never smoke in bed or when lying down on a couch or recliner chair. Small portable electric or kerosene heaters are responsible for many home fires and should be used cautiously if at all. If you do use one, be sure to keep them away from flammable materials. In case of fire, make sure you have a pre-established emergency exit plan. Have a professional check your fireplace and other fuel-burning appliances yearly. Helping [employer]   Baby boomers entering the hu years will continue to see the development of new products to help older adults live safely and independently in spite of age-related changes. Making Life More Livable  , by Novella Kayser, lists over 1,000 products for \"living well in the mature years,\" such as bathing and mobility aids, household security devices, ergonomically designed knives and peelers, and faucet valves and knobs for temperature control. Medical supply stores and organizations are good sources of information about products that improve your quality of life and insure your safety. Last Reviewed: November 2009 Eric Ibrahim MD   Updated: 3/7/2011     ·        Learning About Living Lydia Cherry  What is a living will? A living will, also called a declaration, is a legal form. It tells your family and your doctor your wishes when you can't speak for yourself. It's used by the health professionals who will treat you as you near the end of your life or if you get seriously hurt or ill. If you put your wishes in writing, your loved ones and others will know what kind of care you want. They won't need to guess. This can ease your mind and be helpful to others. And you can change or cancel your living will at any time. A living will is not the same as an estate or property will. An estate will explains what you want to happen with your money and property after you die. How do you use it? A living will is used to describe the kinds of treatment or life support you want as you near the end of your life or if you get seriously hurt or ill. Keep these facts in mind about living brown. Your living will is used only if you can't speak or make decisions for yourself. Most often, one or more doctors must certify that you can't speak or decide for yourself before your living will takes effect. If you get better and can speak for yourself again, you can accept or refuse any treatment. It doesn't matter what you said in your living will. Some states may limit your right to refuse treatment in certain cases. For example, you may need to clearly state in your living will that you don't want artificial hydration and nutrition, such as being fed through a tube. Is a living will a legal document? A living will is a legal document. Each state has its own laws about living brown. And a living will may be called something else in your state. Here are some things to know about living brown. You don't need an  to complete a living will. But legal advice can be helpful if your state's laws are unclear. It can also help if your health history is complicated or your family can't agree on what should be in your living will. Make sure that your family members and your health care agent have copies of your living will (also called a declaration). Give your doctor a copy of your living will. Ask him or her to keep it as part of your medical record. If you have more than one doctor, make sure that each one has a copy. Put a copy of your living will where it can be easily found. For example, some people may put a copy on their refrigerator door. If you are using a digital copy, be sure your doctor, family members, and health care agent know how to find and access it. Where can you learn more? Go to [email or web address] and sign in to your Jemstep account. Enter Q917 in the BitStash box to learn more about \"Learning About Living Renee Srinivasan. \"     If you do not have an account, please click on the \"Sign Up Now\" link. Current as of: December 9, 2019               Content Version: 12.6  © 6328-5404 [employer]. Care instructions adapted under license by [facility] ([facility]). If you have questions about a medical condition or this instruction, always ask your healthcare professional. [place] [address] any warranty or liability for your use of this information.     ·

## 2021-03-01 NOTE — PROGRESS NOTES
for glucose monitoring Yes Linda Benites MD   ONE TOUCH LANCETS MISC Check blood glucose qam Yes Linda Benites MD   Handicap Placard MISC by Does not apply route Unable to ambulate more than 40 feet without difficulty  Expires 2/28/2022 Yes Linda Benites MD   atorvastatin (LIPITOR) 10 MG tablet Take 10 mg by mouth daily  Yes Historical Provider, MD   metoprolol (LOPRESSOR) 25 MG tablet Take 0.5 tablets by mouth 2 times daily. Yes Dinorah Romero MD   aspirin 81 MG tablet Take 81 mg by mouth daily. Yes Historical Provider, MD   multivitamin [facility]) per tablet Take 1 tablet by mouth daily. Yes Historical Provider, MD   Calcium Carbonate-Vitamin D (CALCIUM + D) 600-200 MG-UNIT TABS Take 600 mg by mouth.  Yes Historical Provider, MD   diclofenac sodium (VOLTAREN) 1 % GEL Apply 4 g topically 4 times daily as needed (pain)  Selvin Munroe DO         Past Medical History:   Diagnosis Date    Bundle branch block     CAD (coronary artery disease)     Diabetes mellitus ([facility] [address]     Hyperlipidemia LDL goal < 100 8/24/2015    Spondylosis of lumbar region without myelopathy or radiculopathy 8/28/2018       Past Surgical History:   Procedure Laterality Date    ANESTHESIA NERVE BLOCK Bilateral 2/28/2019    BILATERAL L4-5 L5-S1 INTRA-ARTICULAR FACET STEROID INJECTION (CPT 13501) performed by Selvin Munroe DO at [address]  3/27/13    off-pump cabg x 3- Dr. Joanne Wilson CATH LAB PROCEDURE  3/26/13    FRACTURE SURGERY      NERVE BLOCK Bilateral 02/28/2019    lumbar intra-articular facet    TONSILLECTOMY           Family History   Problem Relation Age of Onset    Heart Disease Mother     Heart Disease Father     Heart Disease Brother        CareTeam (Including outside providers/suppliers regularly involved in providing care):   Patient Care Team:  Linda Benites MD as PCP - General (Family Medicine)  Gracie Pierce MD as [facility]  Emilia Hernandez MD as Consulting Physician (Cardiothoracic Surgery)  Curly Orona MD as Consulting Physician (Cardiology)    Wt Readings from Last 3 Encounters:   03/01/21 211 lb (95.7 kg)   09/01/20 205 lb (93 kg)   03/09/20 205 lb (93 kg)     Vitals:    03/01/21 0934   BP: 124/64   Pulse: 63   Resp: 20   SpO2: 99%   Weight: 211 lb (95.7 kg)   Height: 5' 9\" (1.753 m)     Body mass index is 31.16 kg/m². Based upon direct observation of the patient, evaluation of cognition reveals recent and remote memory intact. Physical Exam  Vitals signs reviewed. Constitutional:       General: He is not in acute distress. Appearance: He is well-developed. Neck:      Musculoskeletal: Neck supple. Vascular: No carotid bruit. Cardiovascular:      Rate and Rhythm: Normal rate and regular rhythm. Heart sounds: Normal heart sounds. No murmur. No gallop. Pulmonary:      Effort: Pulmonary effort is normal.      Breath sounds: Normal breath sounds. No wheezing or rales. Abdominal:      General: Bowel sounds are normal. There is no distension. Palpations: Abdomen is soft. Tenderness: There is no abdominal tenderness. Musculoskeletal:      Right lower leg: No edema. Left lower leg: No edema. Skin:     General: Skin is warm and dry. Neurological:      Mental Status: He is alert and oriented to person, place, and time. Patient's complete Health Risk Assessment and screening values have been reviewed and are found in Flowsheets. The following problems were reviewed today and where indicated follow up appointments were made and/or referrals ordered.     Positive Risk Factor Screenings with Interventions:           Health Habits/Nutrition:  Health Habits/Nutrition  Do you exercise for at least 20 minutes 2-3 times per week?: Yes  Have you lost any weight without trying in the past 3 months?: No  Do you eat only one meal per day?: No  Have you seen the dentist within the past year?: N/A - wear dentures  Body mass index: (!) 31.16  Health Habits/Nutrition Interventions:  · Nutritional issues:  educational materials for healthy, well-balanced diet provided    Hearing/Vision:  No exam data present  Hearing/Vision  Do you or your family notice any trouble with your hearing that hasn't been managed with hearing aids?: (!) Yes  Do you have difficulty driving, watching TV, or doing any of your daily activities because of your eyesight?: No  Have you had an eye exam within the past year?: Yes  Hearing/Vision Interventions:  · Hearing concerns:  none indicated    Safety:  Safety  Do you have working smoke detectors?: Yes  Have all throw rugs been removed or fastened?: Yes  Do you have non-slip mats or surfaces in all bathtubs/showers?: (!) No  Do all of your stairways have a railing or banister?: Yes  Are your doorways, halls and stairs free of clutter?: Yes  Do you always fasten your seatbelt when you are in a car?: Yes  Safety Interventions:  · Home safety tips provided     Personalized Preventive Plan   Current Health Maintenance Status  Immunization History   Administered Date(s) Administered    COVID-19, Moderna, PF, 100mcg/0.5mL 03/01/2021    Influenza, High Dose (Fluzone 65 yrs and older) 10/11/2017, 11/28/2018    Influenza, Triv, 3 Years and older, IM ([address] (5 yrs and older) 12/13/2016    Influenza, Triv, inactivated, subunit, adjuvanted, IM (Fluad 65 yrs and older) 09/09/2019    Pneumococcal Conjugate 13-valent ([id number]) 08/28/2017    Pneumococcal Polysaccharide ([id number]) 08/24/2015    Tdap (Boostrix, Adacel) 03/02/2018        Health Maintenance   Topic Date Due    Shingles Vaccine (1 of 2) 10/31/1986    Diabetic foot exam  03/04/2020    Annual Wellness Visit (AWV)  03/10/2021    COVID-19 Vaccine (2 of 2 - Javier Coad series) 03/29/2021    Diabetic retinal exam  08/01/2021    A1C test (Diabetic or Prediabetic)  09/01/2021    Lipid screen  09/14/2021    DTaP/Tdap/Td vaccine (2 - Td) 03/02/2028    Flu vaccine  Completed    Pneumococcal 65+ years Vaccine  Completed    Hepatitis A vaccine  Aged Out    Hib vaccine  Aged Out    Meningococcal (ACWY) vaccine  Aged Out     Recommendations for [employer] Due: see orders and patient instructions/AVS.  . Recommended screening schedule for the next 5-10 years is provided to the patient in written form: see Patient Instructions/AVS.    Terry Paris was seen today for medicare awv. Diagnoses and all orders for this visit:    Routine general medical examination at a health care facility    Type 2 diabetes mellitus without complication, without long-term current use of insulin (HCC)  -     POCT glycosylated hemoglobin (Hb A1C)    Acquired hypothyroidism  -     TSH without Reflex; Future    Spondylosis of lumbar region without myelopathy or radiculopathy  -     gabapentin (NEURONTIN) 100 MG capsule; Take 1 capsule by mouth 2 times daily.

## 2021-03-29 ENCOUNTER — IMMUNIZATION (OUTPATIENT)
Dept: PRIMARY CARE CLINIC | Age: 86
End: 2021-03-29
Payer: MEDICARE

## 2021-03-29 PROCEDURE — 91301 COVID-19, MODERNA VACCINE 100MCG/0.5ML DOSE: CPT | Performed by: NURSE PRACTITIONER

## 2021-03-29 PROCEDURE — 0012A COVID-19, MODERNA VACCINE 100MCG/0.5ML DOSE: CPT | Performed by: NURSE PRACTITIONER

## 2021-04-13 ENCOUNTER — OFFICE VISIT (OUTPATIENT)
Dept: PHYSICAL MEDICINE AND REHAB | Age: 86
End: 2021-04-13
Payer: MEDICARE

## 2021-04-13 VITALS
HEART RATE: 81 BPM | SYSTOLIC BLOOD PRESSURE: 140 MMHG | DIASTOLIC BLOOD PRESSURE: 80 MMHG | HEIGHT: 69 IN | BODY MASS INDEX: 31.55 KG/M2 | WEIGHT: 213 LBS

## 2021-04-13 DIAGNOSIS — M47.816 LUMBAR SPONDYLOSIS: Primary | ICD-10-CM

## 2021-04-13 PROCEDURE — 99213 OFFICE O/P EST LOW 20 MIN: CPT | Performed by: PHYSICAL MEDICINE & REHABILITATION

## 2021-04-13 NOTE — H&P
Waqar Contreras, [address] [facility] Rehabilitation  [address] [facility] [address]  Phone: [phone]  Fax: [phone]    PCP: Chito Krueger MD  Date of visit: 4/13/21    Chief Complaint   Patient presents with    Back Pain     follow up       Interval:   Patient presents today for office visit regarding low back pain. He was last seen January 2020 for same issue. MBB was recommended but put on hold due to covid. He is ready to proceed with treatment at this time. Recall, he underwent bilateral L4-5 and L5-S1 facet injections 2/28/19 and reports 90% relief for 6 weeks. He complains of the same pain today as prior to his injections. The pain is rated Pain Score:   3, is described as achy, located in the low back without radiating. Worse with walking and standing. Better with sitting. There is no associated numbness/tingling. There is no weakness. There is no bowel/bladder changes. He unfortunately hasn't been able to go to [address] [place], [place] [address] at the [place] for exercise due to covid concerns. The prior workup has included: Xray, MRI     The prior treatment has included:  PT: completed  Chiropractic: none    Modalities: none   OTC Tylenol: yes with relief   NSAIDS: CAD. mobic currently with relief, voltaren gel with some relief    Opioids: none    Membrane stabilizers: none   Muscle relaxers: none    Previous injections: bilateral L4-5 and L5-S1 facet injections- 90% relief    Previous surgery at this site: none      No Known Allergies    Current Outpatient Medications   Medication Sig Dispense Refill    tamsulosin (FLOMAX) 0.4 MG capsule Take 1 capsule by mouth daily 90 capsule 1    gabapentin (NEURONTIN) 100 MG capsule Take 1 capsule by mouth 2 times daily.  60 capsule 2    levothyroxine (SYNTHROID) 50 MCG tablet TAKE ONE TABLET BY MOUTH DAILY 30 tablet 2    blood glucose test strips (ONE TOUCH ULTRA TEST) strip Check blood glucose qam 100 each 12    3/26/1987     Years since quittin.0    Smokeless tobacco: Former User   Substance Use Topics    Alcohol use: No     Alcohol/week: 0.0 standard drinks    Drug use: No       Functional Status: The patient is able to ambulate and perform activities of daily living without the use of an assistive device. Occupation: The patient is currently retired. ROS:   Constitutional: Denies fevers, chills, night sweats, unintentional weight loss     Skin: Denies rash or skin changes     Eyes: Denies vision changes    Ears/Nose/Throat: Denies nasal congestion or sore throat     Respiratory: Denies SOB or cough     Cardiovascular: Denies CP, palpitations, edema      Gastrointestinal: Denies abdominal pain,  N/V, constipation, or diarrhea    Genitourinary: Denies urinary symptoms    Neurologic: See HPI.     MSK: See HPI. Psychiatric: Denies sleep disturbance, anxiety, depression    Hematologic/Lymphatic/Immunologic: Denies bruising       Physical Exam:   Blood pressure (!) 140/80, pulse 81, height 5' 9\" (1.753 m), weight 213 lb (96.6 kg). General: well developed and well nourished in no acute distress. Body habitus is normal  HEENT: No rhinorrhea, sneezing, yawning, or lacrimation. No scleral icterus or conjunctival injection. Resp: symmetrical chest expansion, unlabored breathing, respirations unlabored. CV: Heart rate is regular. Peripheral pulses are palpable  Lymph: No visible regional lymphadenopathy. Skin: No rashes or ecchymosis. Normal turgor. Psych: Mood is calm. Affect is normal.   Ext: No edema noted     MSK:   Back/Hip Exam:   Inspection: Pelvis was asymmetric. Lumbar lordotic curvature was decreased. There was scoliosis present. No ecchymoses or erythema. Palpation: Palpatory exam revealed tenderness along lumbosacral paraspinals, no ttp midline spine, SI joint sulcus, greater trochanters and TFL on both side. There was left lumbar paraspinal spasms. There were no trigger points. +Facet loading     Neurological Exam:  Strength:   R  L  Hip Flex  5  5  Knee Ext  5  5  Ankle dorsi  5  5  EHL   5 5  Ankle Plantar  5  5    Sensory:  Intact for light touch in all lower extremity dermatomes. Reflexes:   R  L  Patellar  (2+) (2+)  Ankle Jerk  (1+) (1+)    Gait is normal. Forward flexed posture      Imaging: (personally reviewed by me 04/13/21)  X-ray L spine      Findings:   AP and lateral views of the lumbar spine were obtained. Mild   multilevel disc space narrowing as well as facet joint narrowing with   subchondral sclerosis and spurring. No fracture.           Impression   Multilevel degenerative disc and degenerative joint   disease. MRI L Spine       T12-L1: Normal T12-L1       L1-L2: Noncompressive bulge and facet hypertrophy. No canal or   foraminal stenosis.       L2-L3: Degenerative disc height loss noted with noncompressive bulge. No central or foraminal stenosis.       L3-L4: Degenerative disc disease noted with noncompressive bulge. Mild   foraminal stenosis noted.       L4-L5: Facet hypertrophy and ligamentum flavum infolding. Small joint   effusion seen with mild disc bulge. Mild lateral recess and moderate   foraminal stenosis noted.       L5-S1: Facet hypertrophy noted with mild foraminal stenosis. No canal   stenosis.       SOFT TISSUES: There is mild increase edema seen in the paraspinal   musculature of the lower lumbar spine with no contusion or facet   abnormality.           Impression       1.  Mild edema in the lower lumbar paraspinal musculature may be   related to muscle strain. 2.  Multilevel degenerative disc disease as discussed. Findings most   severe at L4-5.             Impression:   Adrián Land is a 81 y.o. male     1. Lumbar spondylosis        Plan:   · Patient would benefit from medial branch block bilateral L4-5 and L5-S1 x 2 with intent for RFA. Procedure risks, benefits and alternatives were discussed. Patient would like to proceed. · Continue HEP     The patient was educated about the diagnosis, prognosis, indications, risks and benefits of treatment. An opportunity to ask questions was given to the patient and questions were answered. The patient agreed to proceed with the recommended treatment as described above. Follow up after first MBB       Jr Farfan DO, [address] [place] [address]   Board Certified Physical Medicine and Rehabilitation    The patient was counseled at length about the risks of asa Covid-19 during their perioperative period and any recovery window from their procedure. The patient was made aware that asa Covid-19  may worsen their prognosis for recovering from their procedure  and lend to a higher morbidity and/or mortality risk. All material risks, benefits, and reasonable alternatives including postponing the procedure were discussed. The patient does wish to proceed with the procedure at this time.

## 2021-04-13 NOTE — PROGRESS NOTES
[employer], [address] [facility]  [address] [facility] [facility] [place]  Phone: [phone]  Fax: [phone]    PCP: Bethel Shirley MD  Date of visit: 4/13/21    Chief Complaint   Patient presents with    Back Pain     follow up       Interval:   Patient presents today for office visit regarding low back pain. He was last seen January 2020 for same issue. MBB was recommended but put on hold due to covid. He is ready to proceed with treatment at this time. Recall, he underwent bilateral L4-5 and L5-S1 facet injections 2/28/19 and reports 90% relief for 6 weeks. He complains of the same pain today as prior to his injections. The pain is rated Pain Score:   3, is described as achy, located in the low back without radiating. Worse with walking and standing. Better with sitting. There is no associated numbness/tingling. There is no weakness. There is no bowel/bladder changes. He unfortunately hasn't been able to go to [address] [place], [place] [address] at the [place] for exercise due to covid concerns. The prior workup has included: Xray, MRI     The prior treatment has included:  PT: completed  Chiropractic: none    Modalities: none   OTC Tylenol: yes with relief   NSAIDS: CAD. mobic currently with relief, voltaren gel with some relief    Opioids: none    Membrane stabilizers: none   Muscle relaxers: none    Previous injections: bilateral L4-5 and L5-S1 facet injections- 90% relief    Previous surgery at this site: none      No Known Allergies    Current Outpatient Medications   Medication Sig Dispense Refill    tamsulosin (FLOMAX) 0.4 MG capsule Take 1 capsule by mouth daily 90 capsule 1    gabapentin (NEURONTIN) 100 MG capsule Take 1 capsule by mouth 2 times daily.  60 capsule 2    levothyroxine (SYNTHROID) 50 MCG tablet TAKE ONE TABLET BY MOUTH DAILY 30 tablet 2    blood glucose test strips (ONE TOUCH ULTRA TEST) strip Check blood glucose qam 100 each 12    docusate sodium (COLACE) 100 MG capsule Take 100 mg by mouth daily      ONE TOUCH ULTRASOFT LANCETS MISC 1 each by Does not apply route daily 100 each 12    Lancet Device MISC Use daily for glucose monitoring 1 Device 0    Blood Glucose Monitoring Suppl (ONE TOUCH ULTRA 2) w/Device KIT Use daily for glucose monitoring 1 kit 0    ONE TOUCH LANCETS MISC Check blood glucose qam 100 each 12    Handicap Placard MISC by Does not apply route Unable to ambulate more than 40 feet without difficulty  Expires 2/28/2022 1 each 0    atorvastatin (LIPITOR) 10 MG tablet Take 10 mg by mouth daily       metoprolol (LOPRESSOR) 25 MG tablet Take 0.5 tablets by mouth 2 times daily. 60 tablet 1    aspirin 81 MG tablet Take 81 mg by mouth daily.  multivitamin (THERAGRAN) per tablet Take 1 tablet by mouth daily.  Calcium Carbonate-Vitamin D (CALCIUM + D) 600-200 MG-UNIT TABS Take 600 mg by mouth. No current facility-administered medications for this visit.         Past Medical History:   Diagnosis Date    Bundle branch block     CAD (coronary artery disease)     Diabetes mellitus ([facility] [address]     Hyperlipidemia LDL goal < 100 8/24/2015    Spondylosis of lumbar region without myelopathy or radiculopathy 8/28/2018       Past Surgical History:   Procedure Laterality Date    ANESTHESIA NERVE BLOCK Bilateral 2/28/2019    BILATERAL L4-5 L5-S1 INTRA-ARTICULAR FACET STEROID INJECTION (CPT 80048) performed by [employer] [facility] at [address]  3/27/13    off-pump cabg x 3- Dr. Mireya Coon CATH LAB PROCEDURE  3/26/13    FRACTURE SURGERY      NERVE BLOCK Bilateral 02/28/2019    lumbar intra-articular facet    TONSILLECTOMY         Family History   Problem Relation Age of Onset    Heart Disease Mother     Heart Disease Father     Heart Disease Brother        Social History     Tobacco Use    Smoking status: Former Smoker     Years: 32.00     Quit date: 3/26/1987     Years since quittin.0    Smokeless tobacco: Former User   Substance Use Topics    Alcohol use: No     Alcohol/week: 0.0 standard drinks    Drug use: No       Functional Status: The patient is able to ambulate and perform activities of daily living without the use of an assistive device. Occupation: The patient is currently retired. ROS:   Constitutional: Denies fevers, chills, night sweats, unintentional weight loss     Skin: Denies rash or skin changes     Eyes: Denies vision changes    Ears/Nose/Throat: Denies nasal congestion or sore throat     Respiratory: Denies SOB or cough     Cardiovascular: Denies CP, palpitations, edema      Gastrointestinal: Denies abdominal pain,  N/V, constipation, or diarrhea    Genitourinary: Denies urinary symptoms    Neurologic: See HPI.     MSK: See HPI. Psychiatric: Denies sleep disturbance, anxiety, depression    Hematologic/Lymphatic/Immunologic: Denies bruising       Physical Exam:   Blood pressure (!) 140/80, pulse 81, height 5' 9\" (1.753 m), weight 213 lb (96.6 kg). General: well developed and well nourished in no acute distress. Body habitus is normal  HEENT: No rhinorrhea, sneezing, yawning, or lacrimation. No scleral icterus or conjunctival injection. Resp: symmetrical chest expansion, unlabored breathing, respirations unlabored. CV: Heart rate is regular. Peripheral pulses are palpable  Lymph: No visible regional lymphadenopathy. Skin: No rashes or ecchymosis. Normal turgor. Psych: Mood is calm. Affect is normal.   Ext: No edema noted     MSK:   Back/Hip Exam:   Inspection: Pelvis was asymmetric. Lumbar lordotic curvature was decreased. There was scoliosis present. No ecchymoses or erythema. Palpation: Palpatory exam revealed tenderness along lumbosacral paraspinals, no ttp midline spine, SI joint sulcus, greater trochanters and TFL on both side. There was left lumbar paraspinal spasms. There were no trigger points. · Continue HEP     The patient was educated about the diagnosis, prognosis, indications, risks and benefits of treatment. An opportunity to ask questions was given to the patient and questions were answered. The patient agreed to proceed with the recommended treatment as described above. Follow up after first MBB       Jesus Guzmán DO, [address] [place] [address]   Board Certified Physical Medicine and Rehabilitation    The patient was counseled at length about the risks of asa Covid-19 during their perioperative period and any recovery window from their procedure. The patient was made aware that asa Covid-19  may worsen their prognosis for recovering from their procedure  and lend to a higher morbidity and/or mortality risk. All material risks, benefits, and reasonable alternatives including postponing the procedure were discussed. The patient does wish to proceed with the procedure at this time.

## 2021-04-29 ENCOUNTER — HOSPITAL ENCOUNTER (OUTPATIENT)
Dept: OPERATING ROOM | Age: 86
Setting detail: OUTPATIENT SURGERY
Discharge: HOME | End: 2021-04-29
Attending: PHYSICAL MEDICINE & REHABILITATION
Payer: MEDICARE

## 2021-04-29 ENCOUNTER — HOSPITAL ENCOUNTER (OUTPATIENT)
Age: 86
Setting detail: OUTPATIENT SURGERY
Discharge: HOME | End: 2021-04-29
Attending: PHYSICAL MEDICINE & REHABILITATION | Admitting: PHYSICAL MEDICINE & REHABILITATION
Payer: MEDICARE

## 2021-04-29 VITALS
DIASTOLIC BLOOD PRESSURE: 56 MMHG | HEIGHT: 69 IN | RESPIRATION RATE: 14 BRPM | HEART RATE: 68 BPM | TEMPERATURE: 97.9 F | WEIGHT: 211 LBS | BODY MASS INDEX: 31.25 KG/M2 | SYSTOLIC BLOOD PRESSURE: 114 MMHG | OXYGEN SATURATION: 97 %

## 2021-04-29 DIAGNOSIS — M47.896 OTHER OSTEOARTHRITIS OF SPINE, LUMBAR REGION: ICD-10-CM

## 2021-04-29 PROCEDURE — 2709999900 HC NON-CHARGEABLE SUPPLY: Performed by: PHYSICAL MEDICINE & REHABILITATION

## 2021-04-29 PROCEDURE — 3600000005 HC SURGERY LEVEL 5 BASE: Performed by: PHYSICAL MEDICINE & REHABILITATION

## 2021-04-29 PROCEDURE — 7100000011 HC PHASE II RECOVERY - ADDTL 15 MIN: Performed by: PHYSICAL MEDICINE & REHABILITATION

## 2021-04-29 PROCEDURE — 2500000003 HC RX 250 WO HCPCS: Performed by: PHYSICAL MEDICINE & REHABILITATION

## 2021-04-29 PROCEDURE — 64493 INJ PARAVERT F JNT L/S 1 LEV: CPT | Performed by: PHYSICAL MEDICINE & REHABILITATION

## 2021-04-29 PROCEDURE — 3209999900 FLUORO FOR SURGICAL PROCEDURES

## 2021-04-29 PROCEDURE — 64494 INJ PARAVERT F JNT L/S 2 LEV: CPT | Performed by: PHYSICAL MEDICINE & REHABILITATION

## 2021-04-29 PROCEDURE — 7100000010 HC PHASE II RECOVERY - FIRST 15 MIN: Performed by: PHYSICAL MEDICINE & REHABILITATION

## 2021-04-29 RX ORDER — LIDOCAINE HYDROCHLORIDE 10 MG/ML
INJECTION, SOLUTION EPIDURAL; INFILTRATION; INTRACAUDAL; PERINEURAL PRN
Status: DISCONTINUED | OUTPATIENT
Start: 2021-04-29 | End: 2021-04-29 | Stop reason: ALTCHOICE

## 2021-04-29 RX ORDER — BUPIVACAINE HYDROCHLORIDE 2.5 MG/ML
INJECTION, SOLUTION EPIDURAL; INFILTRATION; INTRACAUDAL PRN
Status: DISCONTINUED | OUTPATIENT
Start: 2021-04-29 | End: 2021-04-29 | Stop reason: ALTCHOICE

## 2021-04-29 ASSESSMENT — PAIN DESCRIPTION - DESCRIPTORS: DESCRIPTORS: ACHING

## 2021-04-29 ASSESSMENT — PAIN SCALES - GENERAL
PAINLEVEL_OUTOF10: 0
PAINLEVEL_OUTOF10: 0

## 2021-04-29 ASSESSMENT — PAIN - FUNCTIONAL ASSESSMENT: PAIN_FUNCTIONAL_ASSESSMENT: 0-10

## 2021-04-29 NOTE — H&P
Samina Jeronimo, [address] [facility] Physical Medicine and Rehabilitation  [address] [facility]  Phone: [phone]  Fax: [phone]     PCP: Gato Mcdowell MD  Date of visit: 4/13/21          Chief Complaint   Patient presents with    Back Pain       follow up         Interval:   Patient presents today for office visit regarding low back pain. He was last seen January 2020 for same issue. MBB was recommended but put on hold due to covid. He is ready to proceed with treatment at this time. Recall, he underwent bilateral L4-5 and L5-S1 facet injections 2/28/19 and reports 90% relief for 6 weeks. He complains of the same pain today as prior to his injections. The pain is rated Pain Score:   3, is described as achy, located in the low back without radiating. Worse with walking and standing. Better with sitting. There is no associated numbness/tingling. There is no weakness. There is no bowel/bladder changes. He unfortunately hasn't been able to go to [address] [place], [place] [address] at the [facility] for exercise due to covid concerns.      The prior workup has included: Xray, MRI      The prior treatment has included:  PT: completed  Chiropractic: none    Modalities: none   OTC Tylenol: yes with relief   NSAIDS: CAD. mobic currently with relief, voltaren gel with some relief    Opioids: none    Membrane stabilizers: none   Muscle relaxers: none    Previous injections: bilateral L4-5 and L5-S1 facet injections- 90% relief    Previous surgery at this site: none        No Known Allergies            Current Outpatient Medications   Medication Sig Dispense Refill    tamsulosin (FLOMAX) 0.4 MG capsule Take 1 capsule by mouth daily 90 capsule 1    gabapentin (NEURONTIN) 100 MG capsule Take 1 capsule by mouth 2 times daily.  60 capsule 2    levothyroxine (SYNTHROID) 50 MCG tablet TAKE ONE TABLET BY MOUTH DAILY 30 tablet 2    blood glucose test strips (ONE TOUCH ULTRA TEST) strip Check blood glucose qam 100 each 12    docusate sodium (COLACE) 100 MG capsule Take 100 mg by mouth daily        ONE TOUCH ULTRASOFT LANCETS MISC 1 each by Does not apply route daily 100 each 12    Lancet Device MISC Use daily for glucose monitoring 1 Device 0    Blood Glucose Monitoring Suppl (ONE TOUCH ULTRA 2) w/Device KIT Use daily for glucose monitoring 1 kit 0    ONE TOUCH LANCETS MISC Check blood glucose qam 100 each 12    Handicap Placard MISC by Does not apply route Unable to ambulate more than 40 feet without difficulty  Expires 2/28/2022 1 each 0    atorvastatin (LIPITOR) 10 MG tablet Take 10 mg by mouth daily         metoprolol (LOPRESSOR) 25 MG tablet Take 0.5 tablets by mouth 2 times daily.  60 tablet 1    aspirin 81 MG tablet Take 81 mg by mouth daily.        multivitamin (THERAGRAN) per tablet Take 1 tablet by mouth daily.        Calcium Carbonate-Vitamin D (CALCIUM + D) 600-200 MG-UNIT TABS Take 600 mg by mouth.          No current facility-administered medications for this visit.               Past Medical History:   Diagnosis Date    Bundle branch block      CAD (coronary artery disease)      Diabetes mellitus (HCC)      Hyperlipidemia LDL goal < 100 8/24/2015    Spondylosis of lumbar region without myelopathy or radiculopathy 8/28/2018               Past Surgical History:   Procedure Laterality Date    ANESTHESIA NERVE BLOCK Bilateral 2/28/2019     BILATERAL L4-5 L5-S1 INTRA-ARTICULAR FACET STEROID INJECTION (CPT 01958) performed by Waqar Contreras DO at [address]   3/27/13     off-pump cabg x 3- Dr. Selvin Bravo CATH LAB PROCEDURE   3/26/13    FRACTURE SURGERY        NERVE BLOCK Bilateral 02/28/2019     lumbar intra-articular facet    TONSILLECTOMY             Family History   Problem Relation Age of Onset    Heart Disease Mother      Heart Disease Father      Heart Disease Brother           Social History     TFL on both side. There was left lumbar paraspinal spasms. There were no trigger points. +Facet loading      Neurological Exam:  Strength:                     R          L  Hip Flex                       5          5  Knee Ext                     5          5  Ankle dorsi                  5          5  EHL                             5          5  Ankle Plantar               5          5     Sensory:  Intact for light touch in all lower extremity dermatomes.       Reflexes:                     R          L  Patellar                        (2+)      (2+)  Ankle Jerk                   (1+)      (1+)     Gait is normal. Forward flexed posture        Imaging: (personally reviewed by me 04/13/21)  X-ray L spine       Findings:   AP and lateral views of the lumbar spine were obtained. Mild   multilevel disc space narrowing as well as facet joint narrowing with   subchondral sclerosis and spurring. No fracture.           Impression   Multilevel degenerative disc and degenerative joint   disease.      MRI L Spine       T12-L1: Normal T12-L1       L1-L2: Noncompressive bulge and facet hypertrophy. No canal or   foraminal stenosis.       L2-L3: Degenerative disc height loss noted with noncompressive bulge. No central or foraminal stenosis.       L3-L4: Degenerative disc disease noted with noncompressive bulge. Mild   foraminal stenosis noted.       L4-L5: Facet hypertrophy and ligamentum flavum infolding. Small joint   effusion seen with mild disc bulge. Mild lateral recess and moderate   foraminal stenosis noted.       L5-S1: Facet hypertrophy noted with mild foraminal stenosis. No canal   stenosis.       SOFT TISSUES: There is mild increase edema seen in the paraspinal   musculature of the lower lumbar spine with no contusion or facet   abnormality.           Impression       1.  Mild edema in the lower lumbar paraspinal musculature may be   related to muscle strain.    2.  Multilevel degenerative disc disease as discussed. Findings most   severe at L4-5.                Impression:   Rose Mary Vidal is a 81 y.o. male      1. Lumbar spondylosis          Plan:   · Patient would benefit from medial branch block bilateral L4-5 and L5-S1 x 2 with intent for RFA. Procedure risks, benefits and alternatives were discussed. Patient would like to proceed. · Continue HEP      · The patient was educated about the diagnosis, prognosis, indications, risks and benefits of treatment. An opportunity to ask questions was given to the patient and questions were answered. The patient agreed to proceed with the recommended treatment as described above.      · Follow up after first MBB         Jaz Molina DO [facility]   Board Certified Physical Medicine and Rehabilitation     The patient was counseled at length about the risks of asa Covid-19 during their perioperative period and any recovery window from their procedure.  The patient was made aware that asa Covid-19  may worsen their prognosis for recovering from their procedure  and lend to a higher morbidity and/or mortality risk.  All material risks, benefits, and reasonable alternatives including postponing the procedure were discussed.  The patient does wish to proceed with the procedure at this time.

## 2021-04-29 NOTE — OP NOTE
Bilateral L4-5, L5-S1. Negative aspiration was noted prior to each injection. The needles were removed intact and Band-Aids were applied. Phoenix was transferred to the recovery area. He was monitored, reassessed and eventually discharged after an appropriate observatory period. No complications were noted. Following the procedure Phoenix noted improvement of previous pain symptoms. Plan: Phoenix will return to the office as scheduled. He was encouraged to call with questions, concerns or if worsening of symptoms occurs.       Saima Crouch DO, [facility]   Board Certified Physical Medicine and Rehabilitation

## 2021-05-17 ENCOUNTER — OFFICE VISIT (OUTPATIENT)
Dept: PHYSICAL MEDICINE AND REHAB | Age: 86
End: 2021-05-17
Payer: MEDICARE

## 2021-05-17 VITALS
HEART RATE: 52 BPM | DIASTOLIC BLOOD PRESSURE: 69 MMHG | BODY MASS INDEX: 31.25 KG/M2 | SYSTOLIC BLOOD PRESSURE: 131 MMHG | HEIGHT: 69 IN | WEIGHT: 211 LBS

## 2021-05-17 DIAGNOSIS — M47.816 LUMBAR SPONDYLOSIS: Primary | ICD-10-CM

## 2021-05-17 PROCEDURE — 99212 OFFICE O/P EST SF 10 MIN: CPT | Performed by: PHYSICAL MEDICINE & REHABILITATION

## 2021-05-17 NOTE — PROGRESS NOTES
Rashid Fleimng, [address] [facility]  [address] [facility]  Phone: [phone]  Fax: [phone]    PCP: Roland Monte MD  Date of visit: 5/17/21    Chief Complaint   Patient presents with    Lower Back Pain       Interval:   Patient presents today for office visit regarding low back pain. He underwent bilateral L4-5 and L5-S1 MBB and reports 100% relief for the day of the procedure. The pain has returned. The pain is rated Pain Score:   4, is described as achy, located in the low back without radiating. Worse with walking and standing. Better with sitting. There is no associated numbness/tingling. There is no weakness. There is no bowel/bladder changes. He unfortunately hasn't been able to go to [address] [place], [place] [address] at the [place] for exercise due to covid concerns. The prior workup has included: Xray, MRI     The prior treatment has included:  PT: completed  Chiropractic: none    Modalities: none   OTC Tylenol: yes with relief   NSAIDS: CAD. mobic currently with relief, voltaren gel with some relief    Opioids: none    Membrane stabilizers: none   Muscle relaxers: none    Previous injections: bilateral L4-5 and L5-S1 facet injections- 90% relief    Bilateral MBB L4-5 and L5-S1- 100% relief   Previous surgery at this site: none      No Known Allergies    Current Outpatient Medications   Medication Sig Dispense Refill    levothyroxine (SYNTHROID) 50 MCG tablet TAKE ONE TABLET BY MOUTH DAILY 90 tablet 1    tamsulosin (FLOMAX) 0.4 MG capsule Take 1 capsule by mouth daily 90 capsule 1    gabapentin (NEURONTIN) 100 MG capsule Take 1 capsule by mouth 2 times daily.  60 capsule 2    blood glucose test strips (ONE TOUCH ULTRA TEST) strip Check blood glucose qam 100 each 12    docusate sodium (COLACE) 100 MG capsule Take 100 mg by mouth daily      ONE TOUCH ULTRASOFT LANCETS MISC 1 each by Does not apply route daily 100 each 12    Lancet Device MISC Use daily for glucose monitoring 1 Device 0    Blood Glucose Monitoring Suppl (ONE TOUCH ULTRA 2) w/Device KIT Use daily for glucose monitoring 1 kit 0    ONE TOUCH LANCETS MISC Check blood glucose qam 100 each 12    Handicap Placard MISC by Does not apply route Unable to ambulate more than 40 feet without difficulty  Expires 2/28/2022 1 each 0    atorvastatin (LIPITOR) 10 MG tablet Take 10 mg by mouth daily       metoprolol (LOPRESSOR) 25 MG tablet Take 0.5 tablets by mouth 2 times daily. 60 tablet 1    aspirin 81 MG tablet Take 81 mg by mouth daily.  multivitamin (THERAGRAN) per tablet Take 1 tablet by mouth daily.  Calcium Carbonate-Vitamin D (CALCIUM + D) 600-200 MG-UNIT TABS Take 600 mg by mouth. No current facility-administered medications for this visit.        Past Medical History:   Diagnosis Date    Bundle branch block     CAD (coronary artery disease)     Diabetes mellitus ([facility] [address]     Hyperlipidemia LDL goal < 100 8/24/2015    Spondylosis of lumbar region without myelopathy or radiculopathy 8/28/2018       Past Surgical History:   Procedure Laterality Date    ANESTHESIA NERVE BLOCK Bilateral 2/28/2019    BILATERAL L4-5 L5-S1 INTRA-ARTICULAR FACET STEROID INJECTION (CPT 02515) performed by Aparna Keenan DO at [address] [place] [address] Rd  3/27/13    off-pump cabg x 3- Dr. Lexi Dobson CATH LAB PROCEDURE  3/26/13    FRACTURE SURGERY      NERVE BLOCK Bilateral 02/28/2019    lumbar intra-articular facet    NERVE BLOCK Bilateral 4/29/2021    BILATERAL MEDIAL BRANCH BLOCK AT L4-5 AND L5-S1 (CPT 98731) performed by Aparna Keenan DO at [facility] 8 N/A 04/29/2021    BILATERAL MEDIAL BRANCH BLOCK L4-5 AND L5-S1     TONSILLECTOMY         Family History   Problem Relation Age of Onset    Heart Disease Mother     Heart Disease Father     Heart Disease Brother        Social History Tobacco Use    Smoking status: Former Smoker     Years: 32.00     Quit date: 3/26/1987     Years since quittin.1    Smokeless tobacco: Former User   Substance Use Topics    Alcohol use: No     Alcohol/week: 0.0 standard drinks    Drug use: No       Functional Status: The patient is able to ambulate and perform activities of daily living without the use of an assistive device. Occupation: The patient is currently retired. ROS:   Constitutional: Denies fevers, chills, night sweats, unintentional weight loss     Skin: Denies rash or skin changes     Eyes: Denies vision changes    Ears/Nose/Throat: Denies nasal congestion or sore throat     Respiratory: Denies SOB or cough     Cardiovascular: Denies CP, palpitations, edema      Gastrointestinal: Denies abdominal pain,  N/V, constipation, or diarrhea    Genitourinary: Denies urinary symptoms    Neurologic: See HPI.     MSK: See HPI. Psychiatric: Denies sleep disturbance, anxiety, depression    Hematologic/Lymphatic/Immunologic: Denies bruising       Physical Exam:   Blood pressure 131/69, pulse 52, height 5' 9\" (1.753 m), weight 211 lb (95.7 kg). General: well developed and well nourished in no acute distress. Body habitus is normal  HEENT: No rhinorrhea, sneezing, yawning, or lacrimation. No scleral icterus or conjunctival injection. Resp: symmetrical chest expansion, unlabored breathing, respirations unlabored. CV: Heart rate is regular. Peripheral pulses are palpable  Lymph: No visible regional lymphadenopathy. Skin: No rashes or ecchymosis. Normal turgor. Psych: Mood is calm. Affect is normal.   Ext: No edema noted     MSK:   Back/Hip Exam:   Inspection: Pelvis was asymmetric. Lumbar lordotic curvature was decreased. There was scoliosis present. No ecchymoses or erythema. Palpation: Palpatory exam revealed tenderness along lumbosacral paraspinals, no ttp midline spine, SI joint sulcus, greater trochanters and TFL on both side. There was left lumbar paraspinal spasms. There were no trigger points. +Facet loading     Neurological Exam:  Strength:   R  L  Hip Flex  5  5  Knee Ext  5  5  Ankle dorsi  5  5  EHL   5 5  Ankle Plantar  5  5    Sensory:  Intact for light touch in all lower extremity dermatomes. Reflexes:   R  L  Patellar  (2+) (2+)  Ankle Jerk  (1+) (1+)    Gait is normal. Forward flexed posture      Imaging: (personally reviewed by me 05/17/21)  X-ray L spine      Findings:   AP and lateral views of the lumbar spine were obtained. Mild   multilevel disc space narrowing as well as facet joint narrowing with   subchondral sclerosis and spurring. No fracture.           Impression   Multilevel degenerative disc and degenerative joint   disease. MRI L Spine       T12-L1: Normal T12-L1       L1-L2: Noncompressive bulge and facet hypertrophy. No canal or   foraminal stenosis.       L2-L3: Degenerative disc height loss noted with noncompressive bulge. No central or foraminal stenosis.       L3-L4: Degenerative disc disease noted with noncompressive bulge. Mild   foraminal stenosis noted.       L4-L5: Facet hypertrophy and ligamentum flavum infolding. Small joint   effusion seen with mild disc bulge. Mild lateral recess and moderate   foraminal stenosis noted.       L5-S1: Facet hypertrophy noted with mild foraminal stenosis. No canal   stenosis.       SOFT TISSUES: There is mild increase edema seen in the paraspinal   musculature of the lower lumbar spine with no contusion or facet   abnormality.           Impression       1.  Mild edema in the lower lumbar paraspinal musculature may be   related to muscle strain. 2.  Multilevel degenerative disc disease as discussed. Findings most   severe at L4-5.             Impression:   Jojo Servin is a 81 y.o. male     1. Lumbar spondylosis        Plan:   · Patient would benefit from medial branch block bilateral L4-5 and L5-S1 #2 with intent for RFA.  Procedure risks, benefits and alternatives were discussed. Patient would like to proceed. · Continue HEP     The patient was educated about the diagnosis, prognosis, indications, risks and benefits of treatment. An opportunity to ask questions was given to the patient and questions were answered. The patient agreed to proceed with the recommended treatment as described above. Follow up after second MBB       Kayla Morales DO, FAAPMR   Board Certified Physical Medicine and Rehabilitation    The patient was counseled at length about the risks of asa Covid-19 during their perioperative period and any recovery window from their procedure. The patient was made aware that asa Covid-19  may worsen their prognosis for recovering from their procedure  and lend to a higher morbidity and/or mortality risk. All material risks, benefits, and reasonable alternatives including postponing the procedure were discussed. The patient does wish to proceed with the procedure at this time.

## 2021-05-17 NOTE — PATIENT INSTRUCTIONS
Patient Education      RADIOFREQUENCY ABLATION   Learning About Medial Branch Block and Neurotomy  What are medial branch block and neurotomy? Facet joints connect your vertebrae to each other. Problems in these joints can cause chronic (long-term) pain in the neck or back. Medial branch nerves are the nerves that carry many of the pain messages from your facet joints. Radiofrequency medial branch neurotomy is a type of medial branch neurotomy that is used to relieve arthritis pain. It uses radio waves to damage nerves in your neck or back so that they can no longer send pain messages to your brain. Before your doctor knows if a neurotomy will help you, you will get a medial branch block to find out if certain nerves are the ones that are a source of your pain. You will need two separate visits to the outpatient center or hospital to have both procedures. You will need someone to drive you home. How is a medial branch block done? The doctor will use a tiny needle to numb the skin where you will get the block. Then the doctor puts the block needle into the numbed area. You may feel some pressure, but you should not feel pain. Using fluoroscopy (live X-ray) to guide the needle, the doctor injects medicine onto one or more nerves to make them numb. If you get relief from your pain in the next 4 to 6 hours, it's a sign that those nerves may be contributing to your pain. The relief will last only a short time. You may then have a medial branch neurotomy at a later visit to try to get longer relief. How is a medial branch neurotomy done? The doctor will use a tiny needle to numb the skin where you will get the neurotomy. Then the doctor puts the neurotomy needle into the numbed area. You may feel some pressure. Using fluoroscopy (live X-ray) to guide the needle, the doctor sends radio waves through the needle to the nerve for 60 to 90 seconds. The radio waves heat the nerve, which damages it.  The doctor may do this several times. And more than one nerve may be treated. How long do medial branch block and neurotomy take? It takes 20 to 30 minutes to get the block. You can go home after the doctor watches you for about an hour. It takes 45 to 90 minutes to get a neurotomy, depending on how many nerves are heated. You will probably go home 30 to 60 minutes after the procedure. What can you expect after a neurotomy? You will get instructions on how to report how much pain you have when you are at home. You may feel a little sore or tender at the injection site at first. But after a successful neurotomy, most people have pain relief right away. It often lasts for several months, but your pain may come back. If your pain does come back, it may mean that the damaged nerve has healed and can send pain messages again. Or it can mean that a different nerve is causing pain. Your doctor will discuss your options with you. Follow-up care is a key part of your treatment and safety. Be sure to make and go to all appointments, and call your doctor if you are having problems. It's also a good idea to know your test results and keep a list of the medicines you take. Where can you learn more? Go to https://[email or web address].[employer]. org and sign in to your [employer] account. Enter P021 in the [facility] box to learn more about \"Learning About Medial Branch Block and Neurotomy. \"     If you do not have an account, please click on the \"Sign Up Now\" link. Current as of: August 4, 2020               Content Version: 12.8  © 2006-2021 [employer]. Care instructions adapted under license by [facility]. If you have questions about a medical condition or this instruction, always ask your healthcare professional. [place] [address] any warranty or liability for your use of this information.

## 2021-05-17 NOTE — H&P
Sandy Duran, [address] [facility] Physical Medicine and Rehabilitation  [address] [facility]  Phone: [phone]  Fax: [phone]    PCP: Darin Quigley MD  Date of visit: 5/17/21    Chief Complaint   Patient presents with    Lower Back Pain       Interval:   Patient presents today for office visit regarding low back pain. He underwent bilateral L4-5 and L5-S1 MBB and reports 100% relief for the day of the procedure. The pain has returned. The pain is rated Pain Score:   4, is described as achy, located in the low back without radiating. Worse with walking and standing. Better with sitting. There is no associated numbness/tingling. There is no weakness. There is no bowel/bladder changes. He unfortunately [facility]'t [facility] [place] [facility] due to covid concerns. The prior workup has included: Xray, MRI     The prior treatment has included:  PT: completed  Chiropractic: none    Modalities: none   OTC Tylenol: yes with relief   NSAIDS: CAD. mobic currently with relief, voltaren gel with some relief    Opioids: none    Membrane stabilizers: none   Muscle relaxers: none    Previous injections: bilateral L4-5 and L5-S1 facet injections- 90% relief    Bilateral MBB L4-5 and L5-S1- 100% relief   Previous surgery at this site: none      No Known Allergies    Current Outpatient Medications   Medication Sig Dispense Refill    levothyroxine (SYNTHROID) 50 MCG tablet TAKE ONE TABLET BY MOUTH DAILY 90 tablet 1    tamsulosin (FLOMAX) 0.4 MG capsule Take 1 capsule by mouth daily 90 capsule 1    gabapentin (NEURONTIN) 100 MG capsule Take 1 capsule by mouth 2 times daily.  60 capsule 2    blood glucose test strips (ONE TOUCH ULTRA TEST) strip Check blood glucose qam 100 each 12    docusate sodium (COLACE) 100 MG capsule Take 100 mg by mouth daily      ONE TOUCH ULTRASOFT LANCETS MISC 1 each by Does not apply route daily 100 each 12    Tobacco Use    Smoking status: Former Smoker     Years: 32.00     Quit date: 3/26/1987     Years since quittin.1    Smokeless tobacco: Former User   Substance Use Topics    Alcohol use: No     Alcohol/week: 0.0 standard drinks    Drug use: No       Functional Status: The patient is able to ambulate and perform activities of daily living without the use of an assistive device. Occupation: The patient is currently retired. ROS:   Constitutional: Denies fevers, chills, night sweats, unintentional weight loss     Skin: Denies rash or skin changes     Eyes: Denies vision changes    Ears/Nose/Throat: Denies nasal congestion or sore throat     Respiratory: Denies SOB or cough     Cardiovascular: Denies CP, palpitations, edema      Gastrointestinal: Denies abdominal pain,  N/V, constipation, or diarrhea    Genitourinary: Denies urinary symptoms    Neurologic: See HPI.     MSK: See HPI. Psychiatric: Denies sleep disturbance, anxiety, depression    Hematologic/Lymphatic/Immunologic: Denies bruising       Physical Exam:   Blood pressure 131/69, pulse 52, height 5' 9\" (1.753 m), weight 211 lb (95.7 kg). General: well developed and well nourished in no acute distress. Body habitus is normal  HEENT: No rhinorrhea, sneezing, yawning, or lacrimation. No scleral icterus or conjunctival injection. Resp: symmetrical chest expansion, unlabored breathing, respirations unlabored. CV: Heart rate is regular. Peripheral pulses are palpable  Lymph: No visible regional lymphadenopathy. Skin: No rashes or ecchymosis. Normal turgor. Psych: Mood is calm. Affect is normal.   Ext: No edema noted     MSK:   Back/Hip Exam:   Inspection: Pelvis was asymmetric. Lumbar lordotic curvature was decreased. There was scoliosis present. No ecchymoses or erythema. Palpation: Palpatory exam revealed tenderness along lumbosacral paraspinals, no ttp midline spine, SI joint sulcus, greater trochanters and TFL on both side. There was left lumbar paraspinal spasms. There were no trigger points. +Facet loading     Neurological Exam:  Strength:   R  L  Hip Flex  5  5  Knee Ext  5  5  Ankle dorsi  5  5  EHL   5 5  Ankle Plantar  5  5    Sensory:  Intact for light touch in all lower extremity dermatomes. Reflexes:   R  L  Patellar  (2+) (2+)  Ankle Jerk  (1+) (1+)    Gait is normal. Forward flexed posture      Imaging: (personally reviewed by me 05/17/21)  X-ray L spine      Findings:   AP and lateral views of the lumbar spine were obtained. Mild   multilevel disc space narrowing as well as facet joint narrowing with   subchondral sclerosis and spurring. No fracture.           Impression   Multilevel degenerative disc and degenerative joint   disease. MRI L Spine       T12-L1: Normal T12-L1       L1-L2: Noncompressive bulge and facet hypertrophy. No canal or   foraminal stenosis.       L2-L3: Degenerative disc height loss noted with noncompressive bulge. No central or foraminal stenosis.       L3-L4: Degenerative disc disease noted with noncompressive bulge. Mild   foraminal stenosis noted.       L4-L5: Facet hypertrophy and ligamentum flavum infolding. Small joint   effusion seen with mild disc bulge. Mild lateral recess and moderate   foraminal stenosis noted.       L5-S1: Facet hypertrophy noted with mild foraminal stenosis. No canal   stenosis.       SOFT TISSUES: There is mild increase edema seen in the paraspinal   musculature of the lower lumbar spine with no contusion or facet   abnormality.           Impression       1.  Mild edema in the lower lumbar paraspinal musculature may be   related to muscle strain. 2.  Multilevel degenerative disc disease as discussed. Findings most   severe at L4-5.             Impression:   Marcus Zeng is a 81 y.o. male     1. Lumbar spondylosis        Plan:   · Patient would benefit from medial branch block bilateral L4-5 and L5-S1 #2 with intent for RFA.  Procedure risks, benefits and alternatives were discussed. Patient would like to proceed. · Continue HEP     The patient was educated about the diagnosis, prognosis, indications, risks and benefits of treatment. An opportunity to ask questions was given to the patient and questions were answered. The patient agreed to proceed with the recommended treatment as described above. Follow up after second MBB       Aparna Keenan DO, FAAPMR   Board Certified Physical Medicine and Rehabilitation    The patient was counseled at length about the risks of asa Covid-19 during their perioperative period and any recovery window from their procedure. The patient was made aware that asa Covid-19  may worsen their prognosis for recovering from their procedure  and lend to a higher morbidity and/or mortality risk. All material risks, benefits, and reasonable alternatives including postponing the procedure were discussed. The patient does wish to proceed with the procedure at this time.

## 2021-05-18 ENCOUNTER — TELEPHONE (OUTPATIENT)
Dept: PHYSICAL MEDICINE AND REHAB | Age: 86
End: 2021-05-18

## 2021-05-18 NOTE — TELEPHONE ENCOUNTER
Called to schedule MBB with Dr. Don Tellez. Left message with callback number and instructed patient to call the office to be scheduled. Will wait for patient to call the office.

## 2021-05-19 ENCOUNTER — TELEPHONE (OUTPATIENT)
Dept: FAMILY MEDICINE CLINIC | Age: 86
End: 2021-05-19

## 2021-05-19 NOTE — PROGRESS NOTES
Did not receive this message from as it was left on answering machine. I was never personally notified yesterday or today. Patient is here now. Does not complain of any symptoms. He has appointment with PCP Monday. Jaz Molina DO, FAAPMR   Board Certified Physical Medicine and Rehabilitation      Pt states he passed out 5/18/21 at a restaurant in [place] ,states does not know how long he was unconscious, did not seek medical care, wife drove him home. Advised/ instructed to notify primary care physician today of this, pt verifies understanding. Dr Stacy London office notified of this via message on [employer] answering service.

## 2021-05-19 NOTE — TELEPHONE ENCOUNTER
Patient said that Daniel Martinez wanted him to inform Dr. Amaris García that he passed out yesterday. Patient stated that this is not the first time this has happened. He believes that it was the food that he ate.

## 2021-05-20 ENCOUNTER — HOSPITAL ENCOUNTER (OUTPATIENT)
Dept: OPERATING ROOM | Age: 86
Setting detail: OUTPATIENT SURGERY
Discharge: HOME | End: 2021-05-20
Attending: PHYSICAL MEDICINE & REHABILITATION
Payer: MEDICARE

## 2021-05-20 ENCOUNTER — HOSPITAL ENCOUNTER (OUTPATIENT)
Age: 86
Setting detail: OUTPATIENT SURGERY
Discharge: HOME | End: 2021-05-20
Attending: PHYSICAL MEDICINE & REHABILITATION | Admitting: PHYSICAL MEDICINE & REHABILITATION
Payer: MEDICARE

## 2021-05-20 VITALS
DIASTOLIC BLOOD PRESSURE: 58 MMHG | HEART RATE: 52 BPM | HEIGHT: 69 IN | WEIGHT: 211 LBS | OXYGEN SATURATION: 96 % | SYSTOLIC BLOOD PRESSURE: 109 MMHG | RESPIRATION RATE: 16 BRPM | BODY MASS INDEX: 31.25 KG/M2

## 2021-05-20 DIAGNOSIS — M47.896 OTHER OSTEOARTHRITIS OF SPINE, LUMBAR REGION: ICD-10-CM

## 2021-05-20 PROCEDURE — 7100000010 HC PHASE II RECOVERY - FIRST 15 MIN: Performed by: PHYSICAL MEDICINE & REHABILITATION

## 2021-05-20 PROCEDURE — 7100000011 HC PHASE II RECOVERY - ADDTL 15 MIN: Performed by: PHYSICAL MEDICINE & REHABILITATION

## 2021-05-20 PROCEDURE — 2709999900 HC NON-CHARGEABLE SUPPLY: Performed by: PHYSICAL MEDICINE & REHABILITATION

## 2021-05-20 PROCEDURE — 3209999900 FLUORO FOR SURGICAL PROCEDURES

## 2021-05-20 PROCEDURE — 3600000005 HC SURGERY LEVEL 5 BASE: Performed by: PHYSICAL MEDICINE & REHABILITATION

## 2021-05-20 PROCEDURE — 2500000003 HC RX 250 WO HCPCS: Performed by: PHYSICAL MEDICINE & REHABILITATION

## 2021-05-20 RX ORDER — BUPIVACAINE HYDROCHLORIDE 2.5 MG/ML
INJECTION, SOLUTION EPIDURAL; INFILTRATION; INTRACAUDAL PRN
Status: DISCONTINUED | OUTPATIENT
Start: 2021-05-20 | End: 2021-05-20 | Stop reason: ALTCHOICE

## 2021-05-20 RX ORDER — LIDOCAINE HYDROCHLORIDE 10 MG/ML
INJECTION, SOLUTION EPIDURAL; INFILTRATION; INTRACAUDAL; PERINEURAL PRN
Status: DISCONTINUED | OUTPATIENT
Start: 2021-05-20 | End: 2021-05-20 | Stop reason: ALTCHOICE

## 2021-05-20 ASSESSMENT — PAIN SCALES - GENERAL
PAINLEVEL_OUTOF10: 0
PAINLEVEL_OUTOF10: 0

## 2021-05-20 NOTE — TELEPHONE ENCOUNTER
I called patient who stated he has not had an episode of passing out since September. Patient stated he has a procedure today with Dr. Luis Griffiths and is unable to come in because of that. I offered appt 5/24/21 at 11:15 am, which patient confirmed. Advised patient if symptoms increase or worsen, go to ED. Patient stated understanding.     Last seen 3/1/2021  Next appt 5/24/2021

## 2021-05-20 NOTE — H&P
Desire Longoria, [address] [facility] Physical Medicine and Rehabilitation  [address] [place] [address]. [address] [facility]  Phone: [phone]  Fax: [phone]     PCP: Patricia Carranza MD  Date of visit: 5/17/21         Chief Complaint   Patient presents with    Lower Back Pain         Interval:   Patient presents today for office visit regarding low back pain. He underwent bilateral L4-5 and L5-S1 MBB and reports 100% relief for the day of the procedure. The pain has returned. The pain is rated Pain Score:   4, is described as achy, located in the low back without radiating. Worse with walking and standing. Better with sitting. There is no associated numbness/tingling. There is no weakness. There is no bowel/bladder changes. He unfortunately hasn't been able to go to [address] [place], [place] [address] at the [facility] for exercise due to covid concerns.      The prior workup has included: Xray, MRI      The prior treatment has included:  PT: completed  Chiropractic: none    Modalities: none   OTC Tylenol: yes with relief   NSAIDS: CAD. mobic currently with relief, voltaren gel with some relief    Opioids: none    Membrane stabilizers: none   Muscle relaxers: none    Previous injections: bilateral L4-5 and L5-S1 facet injections- 90% relief    Bilateral MBB L4-5 and L5-S1- 100% relief   Previous surgery at this site: none        No Known Allergies            Current Outpatient Medications   Medication Sig Dispense Refill    levothyroxine (SYNTHROID) 50 MCG tablet TAKE ONE TABLET BY MOUTH DAILY 90 tablet 1    tamsulosin (FLOMAX) 0.4 MG capsule Take 1 capsule by mouth daily 90 capsule 1    gabapentin (NEURONTIN) 100 MG capsule Take 1 capsule by mouth 2 times daily.  60 capsule 2    blood glucose test strips (ONE TOUCH ULTRA TEST) strip Check blood glucose qam 100 each 12    docusate sodium (COLACE) 100 MG capsule Take 100 mg by mouth daily        ONE TOUCH ULTRASOFT LANCETS MISC 1 each by Does not apply route daily 100 each 12    Lancet Device MISC Use daily for glucose monitoring 1 Device 0    Blood Glucose Monitoring Suppl (ONE TOUCH ULTRA 2) w/Device KIT Use daily for glucose monitoring 1 kit 0    ONE TOUCH LANCETS MISC Check blood glucose qam 100 each 12    Handicap Placard MISC by Does not apply route Unable to ambulate more than 40 feet without difficulty  Expires 2/28/2022 1 each 0    atorvastatin (LIPITOR) 10 MG tablet Take 10 mg by mouth daily         metoprolol (LOPRESSOR) 25 MG tablet Take 0.5 tablets by mouth 2 times daily.  60 tablet 1    aspirin 81 MG tablet Take 81 mg by mouth daily.        multivitamin (THERAGRAN) per tablet Take 1 tablet by mouth daily.        Calcium Carbonate-Vitamin D (CALCIUM + D) 600-200 MG-UNIT TABS Take 600 mg by mouth.          No current facility-administered medications for this visit.              Past Medical History:   Diagnosis Date    Bundle branch block      CAD (coronary artery disease)      Diabetes mellitus ([facility] [address]      Hyperlipidemia LDL goal < 100 8/24/2015    Spondylosis of lumbar region without myelopathy or radiculopathy 8/28/2018               Past Surgical History:   Procedure Laterality Date    ANESTHESIA NERVE BLOCK Bilateral 2/28/2019     BILATERAL L4-5 L5-S1 INTRA-ARTICULAR FACET STEROID INJECTION (CPT 96766) performed by [employer] DO at [address]   3/27/13     off-pump cabg x 3- Dr. Haider De Los Santos CATH LAB PROCEDURE   3/26/13    FRACTURE SURGERY        NERVE BLOCK Bilateral 02/28/2019     lumbar intra-articular facet    NERVE BLOCK Bilateral 4/29/2021     BILATERAL MEDIAL BRANCH BLOCK AT L4-5 AND L5-S1 (CPT 70501) performed by [employer] DO at [facility] 8 N/A 04/29/2021     BILATERAL MEDIAL BRANCH BLOCK L4-5 AND L5-S1     TONSILLECTOMY                   Family History   Problem Relation Age of Onset    Heart Disease Mother      Heart paraspinals, no ttp midline spine, SI joint sulcus, greater trochanters and TFL on both side. There was left lumbar paraspinal spasms. There were no trigger points. +Facet loading      Neurological Exam:  Strength:                     R          L  Hip Flex                       5          5  Knee Ext                     5          5  Ankle dorsi                  5          5  EHL                             5          5  Ankle Plantar               5          5     Sensory:  Intact for light touch in all lower extremity dermatomes.       Reflexes:                     R          L  Patellar                        (2+)      (2+)  Ankle Jerk                   (1+)      (1+)     Gait is normal. Forward flexed posture        Imaging: (personally reviewed by me 05/17/21)  X-ray L spine       Findings:   AP and lateral views of the lumbar spine were obtained. Mild   multilevel disc space narrowing as well as facet joint narrowing with   subchondral sclerosis and spurring. No fracture.           Impression   Multilevel degenerative disc and degenerative joint   disease.      MRI L Spine       T12-L1: Normal T12-L1       L1-L2: Noncompressive bulge and facet hypertrophy. No canal or   foraminal stenosis.       L2-L3: Degenerative disc height loss noted with noncompressive bulge. No central or foraminal stenosis.       L3-L4: Degenerative disc disease noted with noncompressive bulge. Mild   foraminal stenosis noted.       L4-L5: Facet hypertrophy and ligamentum flavum infolding. Small joint   effusion seen with mild disc bulge. Mild lateral recess and moderate   foraminal stenosis noted.       L5-S1: Facet hypertrophy noted with mild foraminal stenosis.  No canal   stenosis.       SOFT TISSUES: There is mild increase edema seen in the paraspinal   musculature of the lower lumbar spine with no contusion or facet   abnormality.           Impression       1.  Mild edema in the lower lumbar paraspinal musculature may be related to muscle strain. 2.  Multilevel degenerative disc disease as discussed. Findings most   severe at L4-5.                Impression:   Candice Gonzalez is a 81 y.o. male      1. Lumbar spondylosis          Plan:   · Patient would benefit from medial branch block bilateral L4-5 and L5-S1 #2 with intent for RFA. Procedure risks, benefits and alternatives were discussed. Patient would like to proceed. · Continue HEP      · The patient was educated about the diagnosis, prognosis, indications, risks and benefits of treatment. An opportunity to ask questions was given to the patient and questions were answered. The patient agreed to proceed with the recommended treatment as described above.      · Follow up after second MBB         Eloise Grewal DO [facility]   Board Certified Physical Medicine and Rehabilitation     The patient was counseled at length about the risks of asa Covid-19 during their perioperative period and any recovery window from their procedure.  The patient was made aware that asa Covid-19  may worsen their prognosis for recovering from their procedure  and lend to a higher morbidity and/or mortality risk.  All material risks, benefits, and reasonable alternatives including postponing the procedure were discussed.  The patient does wish to proceed with the procedure at this time.

## 2021-05-24 ENCOUNTER — OFFICE VISIT (OUTPATIENT)
Dept: FAMILY MEDICINE CLINIC | Age: 86
End: 2021-05-24
Payer: MEDICARE

## 2021-05-24 VITALS
RESPIRATION RATE: 20 BRPM | DIASTOLIC BLOOD PRESSURE: 62 MMHG | WEIGHT: 203 LBS | BODY MASS INDEX: 30.07 KG/M2 | HEART RATE: 52 BPM | HEIGHT: 69 IN | SYSTOLIC BLOOD PRESSURE: 118 MMHG | OXYGEN SATURATION: 98 %

## 2021-05-24 DIAGNOSIS — E03.9 ACQUIRED HYPOTHYROIDISM: ICD-10-CM

## 2021-05-24 DIAGNOSIS — R55 SYNCOPE, UNSPECIFIED SYNCOPE TYPE: ICD-10-CM

## 2021-05-24 DIAGNOSIS — R55 SYNCOPE, UNSPECIFIED SYNCOPE TYPE: Primary | ICD-10-CM

## 2021-05-24 LAB
ALBUMIN SERPL-MCNC: 4.1 G/DL (ref 3.5–5.2)
ALP BLD-CCNC: 66 U/L (ref 40–129)
ALT SERPL-CCNC: 14 U/L (ref 0–40)
ANION GAP SERPL CALCULATED.3IONS-SCNC: 10 MMOL/L (ref 7–16)
AST SERPL-CCNC: 20 U/L (ref 0–39)
BILIRUB SERPL-MCNC: 0.3 MG/DL (ref 0–1.2)
BUN BLDV-MCNC: 18 MG/DL (ref 6–23)
CALCIUM SERPL-MCNC: 9.4 MG/DL (ref 8.6–10.2)
CHLORIDE BLD-SCNC: 104 MMOL/L (ref 98–107)
CO2: 28 MMOL/L (ref 22–29)
CREAT SERPL-MCNC: 1.1 MG/DL (ref 0.7–1.2)
FOLATE: >20 NG/ML (ref 4.8–24.2)
GFR AFRICAN AMERICAN: >60
GFR NON-AFRICAN AMERICAN: >60 ML/MIN/1.73
GLUCOSE BLD-MCNC: 109 MG/DL (ref 74–99)
HCT VFR BLD CALC: 44.5 % (ref 37–54)
HEMOGLOBIN: 14.1 G/DL (ref 12.5–16.5)
MCH RBC QN AUTO: 31.4 PG (ref 26–35)
MCHC RBC AUTO-ENTMCNC: 31.7 % (ref 32–34.5)
MCV RBC AUTO: 99.1 FL (ref 80–99.9)
PDW BLD-RTO: 15.3 FL (ref 11.5–15)
PLATELET # BLD: 201 E9/L (ref 130–450)
PMV BLD AUTO: 9.1 FL (ref 7–12)
POTASSIUM SERPL-SCNC: 5.5 MMOL/L (ref 3.5–5)
RBC # BLD: 4.49 E12/L (ref 3.8–5.8)
SODIUM BLD-SCNC: 142 MMOL/L (ref 132–146)
TOTAL PROTEIN: 7.1 G/DL (ref 6.4–8.3)
TSH SERPL DL<=0.05 MIU/L-ACNC: 5.67 UIU/ML (ref 0.27–4.2)
VITAMIN B-12: 710 PG/ML (ref 211–946)
WBC # BLD: 5.5 E9/L (ref 4.5–11.5)

## 2021-05-24 PROCEDURE — 99214 OFFICE O/P EST MOD 30 MIN: CPT | Performed by: FAMILY MEDICINE

## 2021-05-24 SDOH — ECONOMIC STABILITY: FOOD INSECURITY: WITHIN THE PAST 12 MONTHS, THE FOOD YOU BOUGHT JUST DIDN'T LAST AND YOU DIDN'T HAVE MONEY TO GET MORE.: NEVER TRUE

## 2021-05-24 SDOH — ECONOMIC STABILITY: FOOD INSECURITY: WITHIN THE PAST 12 MONTHS, YOU WORRIED THAT YOUR FOOD WOULD RUN OUT BEFORE YOU GOT MONEY TO BUY MORE.: NEVER TRUE

## 2021-05-24 ASSESSMENT — ENCOUNTER SYMPTOMS
VISUAL CHANGE: 0
VOMITING: 0
BOWEL INCONTINENCE: 0
NAUSEA: 0

## 2021-05-24 NOTE — OP NOTE
Bilateral L4-5, L5-S1. Negative aspiration was noted prior to each injection. The needles were removed intact and Band-Aids were applied. Jacqueline Soriano was transferred to the recovery area. He was monitored, reassessed and eventually discharged after an appropriate observatory period. No complications were noted. Following the procedure Jacqueline Soriano noted improvement of previous pain symptoms. Plan: Jacqueline Soriano will return to the office as scheduled. He was encouraged to call with questions, concerns or if worsening of symptoms occurs.       Samina Jeronimo DO, [facility]   Board Certified Physical Medicine and Rehabilitation

## 2021-05-24 NOTE — PATIENT INSTRUCTIONS
vision changes. ? Sudden trouble speaking. ? Sudden confusion or trouble understanding simple statements. ? Sudden problems with walking or balance. ? A sudden, severe headache that is different from past headaches.     · You passed out (lost consciousness) again. Watch closely for changes in your health, and be sure to contact your doctor if:    · You do not get better as expected. Where can you learn more? Go to [email or web address].mobi.[email or web address]. org and sign in to your [employer] account. Enter P544 in the [employer] box to learn more about \"Fainting: Care Instructions. \"     If you do not have an account, please click on the \"Sign Up Now\" link. Current as of: February 26, 2020               Content Version: 12.8  © 2006-2021 Healthwise, Incorporated. Care instructions adapted under license by [facility] ([facility]). If you have questions about a medical condition or this instruction, always ask your healthcare professional. [place] [address] any warranty or liability for your use of this information.

## 2021-05-24 NOTE — PROGRESS NOTES
[address] [facility], [address]   [address] [facility]   May Morrison MD     Patient: Antonio Gray Birth: 1935  Visit Date: 5/24/21    Raoul Leon is a 81y.o. year old male here today for   Chief Complaint   Patient presents with    Loss of Consciousness     had a second event of passing out while sitting down        HPI  Loss of Consciousness  This is a recurrent problem. The current episode started in the past 7 days. The problem occurs rarely (occurred 5/18 and another time in September 2020). The problem has been resolved. He lost consciousness for a period of less than 1 minute. Exacerbated by: Occurred after eating fried chicken both times. Associated symptoms include bladder incontinence, clumsiness, dizziness, light-headedness and weakness. Pertinent negatives include no auditory change, aura, bowel incontinence, chest pain, diaphoresis, headaches, nausea, palpitations, vertigo, visual change or vomiting. He has tried nothing for the symptoms. His past medical history is significant for CAD. Patient had recent stress test with cardiologist--was normal.            Review of Systems   Constitutional: Negative for diaphoresis. Cardiovascular: Positive for syncope. Negative for chest pain and palpitations. Gastrointestinal: Negative for bowel incontinence, nausea and vomiting. Genitourinary: Positive for bladder incontinence. Neurological: Positive for dizziness, weakness and light-headedness. Negative for vertigo and headaches. Past medical, surgical, social and/or family historyreviewed, updated as needed, and are non-contributory (unless otherwise stated). Medications, allergies, and problem list also reviewed and updated as needed in patient's record.      Current Outpatient Medications   Medication Sig Dispense Refill    levothyroxine (SYNTHROID) 50 MCG tablet TAKE ONE TABLET BY MOUTH DAILY 90 tablet 1    tamsulosin (FLOMAX) 0.4 MG capsule Take 1 capsule by mouth daily 90 capsule 1    blood glucose test strips (ONE TOUCH ULTRA TEST) strip Check blood glucose qam 100 each 12    docusate sodium (COLACE) 100 MG capsule Take 100 mg by mouth daily      ONE TOUCH ULTRASOFT LANCETS MISC 1 each by Does not apply route daily 100 each 12    Lancet Device MISC Use daily for glucose monitoring 1 Device 0    Blood Glucose Monitoring Suppl (ONE TOUCH ULTRA 2) w/Device KIT Use daily for glucose monitoring 1 kit 0    ONE TOUCH LANCETS MISC Check blood glucose qam 100 each 12    Handicap Placard MISC by Does not apply route Unable to ambulate more than 40 feet without difficulty  Expires 2/28/2022 1 each 0    atorvastatin (LIPITOR) 10 MG tablet Take 10 mg by mouth daily       metoprolol (LOPRESSOR) 25 MG tablet Take 0.5 tablets by mouth 2 times daily. 60 tablet 1    aspirin 81 MG tablet Take 81 mg by mouth daily.  multivitamin (THERAGRAN) per tablet Take 1 tablet by mouth daily.  Calcium Carbonate-Vitamin D (CALCIUM + D) 600-200 MG-UNIT TABS Take 600 mg by mouth. No current facility-administered medications for this visit. Wt Readings from Last 3 Encounters:   05/24/21 203 lb (92.1 kg)   05/19/21 211 lb (95.7 kg)   05/17/21 211 lb (95.7 kg)                   Vitals:    05/24/21 1124   BP: 118/62   Pulse: 52   Resp: 20   SpO2: 98%       Physical Exam  Vitals reviewed. Constitutional:       General: He is not in acute distress. Appearance: He is well-developed. Neck:      Vascular: No carotid bruit. Cardiovascular:      Rate and Rhythm: Normal rate and regular rhythm. Heart sounds: Normal heart sounds. No murmur heard. No gallop. Pulmonary:      Effort: Pulmonary effort is normal.      Breath sounds: Normal breath sounds. No wheezing or rales. Abdominal:      General: Bowel sounds are normal. There is no distension. Palpations: Abdomen is soft. Tenderness:  There is no abdominal tenderness. Musculoskeletal:      Cervical back: Neck supple. Right lower leg: No edema. Left lower leg: No edema. Skin:     General: Skin is warm and dry. Neurological:      Mental Status: He is alert and oriented to person, place, and time. Results for orders placed or performed in visit on 03/01/21   TSH without Reflex   Result Value Ref Range    TSH 2.520 0.270 - 4.200 uIU/mL       ASSESSMENT/PLAN  Vita Shepherd was seen today for loss of consciousness. Diagnoses and all orders for this visit:    Syncope, unspecified syncope type  -     CBC; Future  -     Comprehensive Metabolic Panel; Future  -     Vitamin B12 & Folate; Future    Acquired hypothyroidism  -     TSH without Reflex; Future        -     Synthroid 50 mcg daily for management of condition          Return for scheduled appointment. or sooner if necessary. I have reviewed my findings and recommendations with Vita Joao.      Sherly Dow MD, M.D

## 2021-06-01 ENCOUNTER — TELEPHONE (OUTPATIENT)
Dept: FAMILY MEDICINE CLINIC | Age: 86
End: 2021-06-01

## 2021-06-01 NOTE — TELEPHONE ENCOUNTER
I called patient and reviewed recent lab results. His metoprolol could be causing his hyperkalemia. He will see Dr. Catherine Little next week, so copy of labs faxed over.

## 2021-06-07 ENCOUNTER — OFFICE VISIT (OUTPATIENT)
Dept: PHYSICAL MEDICINE AND REHAB | Age: 86
End: 2021-06-07
Payer: MEDICARE

## 2021-06-07 VITALS
DIASTOLIC BLOOD PRESSURE: 75 MMHG | WEIGHT: 212 LBS | BODY MASS INDEX: 31.4 KG/M2 | HEIGHT: 69 IN | HEART RATE: 62 BPM | SYSTOLIC BLOOD PRESSURE: 135 MMHG

## 2021-06-07 DIAGNOSIS — M47.816 LUMBAR SPONDYLOSIS: Primary | ICD-10-CM

## 2021-06-07 DIAGNOSIS — M47.816 SPONDYLOSIS OF LUMBAR REGION WITHOUT MYELOPATHY OR RADICULOPATHY: ICD-10-CM

## 2021-06-07 PROCEDURE — 99213 OFFICE O/P EST LOW 20 MIN: CPT | Performed by: PHYSICAL MEDICINE & REHABILITATION

## 2021-06-07 NOTE — H&P
Heart Disease Father     Heart Disease Brother        Social History     Tobacco Use    Smoking status: Former Smoker     Years: 32.00     Quit date: 3/26/1987     Years since quittin.2    Smokeless tobacco: Former User   Substance Use Topics    Alcohol use: No     Alcohol/week: 0.0 standard drinks    Drug use: No       Functional Status: The patient is able to ambulate and perform activities of daily living without the use of an assistive device. Occupation: The patient is currently retired. ROS:   Constitutional: Denies fevers, chills, night sweats, unintentional weight loss     Skin: Denies rash or skin changes     Eyes: Denies vision changes    Ears/Nose/Throat: Denies nasal congestion or sore throat     Respiratory: Denies SOB or cough     Cardiovascular: Denies CP, palpitations, edema      Gastrointestinal: Denies abdominal pain,  N/V, constipation, or diarrhea    Genitourinary: Denies urinary symptoms    Neurologic: See HPI.     MSK: See HPI. Psychiatric: Denies sleep disturbance, anxiety, depression    Hematologic/Lymphatic/Immunologic: Denies bruising       Physical Exam:   Blood pressure 135/75, pulse 62, height 5' 9\" (1.753 m), weight 212 lb (96.2 kg). General: well developed and well nourished in no acute distress. Body habitus is normal  HEENT: No rhinorrhea, sneezing, yawning, or lacrimation. No scleral icterus or conjunctival injection. Resp: symmetrical chest expansion, unlabored breathing, respirations unlabored. CV: Heart rate is regular. Peripheral pulses are palpable  Lymph: No visible regional lymphadenopathy. Skin: No rashes or ecchymosis. Normal turgor. Psych: Mood is calm. Affect is normal.   Ext: No edema noted     MSK:   Back/Hip Exam:   Inspection: Pelvis was asymmetric. Lumbar lordotic curvature was decreased. There was scoliosis present. No ecchymoses or erythema.    Palpation: Palpatory exam revealed tenderness along lumbosacral paraspinals, no ttp midline spine, SI joint sulcus, greater trochanters and TFL on both side. There was left lumbar paraspinal spasms. There were no trigger points. +Facet loading     Neurological Exam:  Strength:   R  L  Hip Flex  5  5  Knee Ext  5  5  Ankle dorsi  5  5  EHL   5 5  Ankle Plantar  5  5    Sensory:  Intact for light touch in all lower extremity dermatomes. Reflexes:   R  L  Patellar  (2+) (2+)  Ankle Jerk  (1+) (1+)    Gait is normal. Forward flexed posture      Imaging: (personally reviewed by me 06/07/21)  X-ray L spine      Findings:   AP and lateral views of the lumbar spine were obtained. Mild   multilevel disc space narrowing as well as facet joint narrowing with   subchondral sclerosis and spurring. No fracture.           Impression   Multilevel degenerative disc and degenerative joint   disease. MRI L Spine       T12-L1: Normal T12-L1       L1-L2: Noncompressive bulge and facet hypertrophy. No canal or   foraminal stenosis.       L2-L3: Degenerative disc height loss noted with noncompressive bulge. No central or foraminal stenosis.       L3-L4: Degenerative disc disease noted with noncompressive bulge. Mild   foraminal stenosis noted.       L4-L5: Facet hypertrophy and ligamentum flavum infolding. Small joint   effusion seen with mild disc bulge. Mild lateral recess and moderate   foraminal stenosis noted.       L5-S1: Facet hypertrophy noted with mild foraminal stenosis. No canal   stenosis.       SOFT TISSUES: There is mild increase edema seen in the paraspinal   musculature of the lower lumbar spine with no contusion or facet   abnormality.           Impression       1.  Mild edema in the lower lumbar paraspinal musculature may be   related to muscle strain. 2.  Multilevel degenerative disc disease as discussed. Findings most   severe at L4-5.             Impression:   Anthony Angulo is a 81 y.o. male     1. Lumbar spondylosis        Plan:   · Had two positive diagnostic MBB.  Will proceed with right then left radiofrequency ablation at L4-5 and L5-S1. Procedure risks, benefits and alternatives were discussed. Patient would like to proceed. · Continue HEP     The patient was educated about the diagnosis, prognosis, indications, risks and benefits of treatment. An opportunity to ask questions was given to the patient and questions were answered. The patient agreed to proceed with the recommended treatment as described above.      Follow up after RFA       Js Tavera DO [facility]   Board Certified Physical Medicine and Rehabilitation

## 2021-06-07 NOTE — PROGRESS NOTES
LANCETS MISC Check blood glucose qam 100 each 12    Handicap Placard MISC by Does not apply route Unable to ambulate more than 40 feet without difficulty  Expires 2/28/2022 1 each 0    atorvastatin (LIPITOR) 10 MG tablet Take 10 mg by mouth daily       metoprolol (LOPRESSOR) 25 MG tablet Take 0.5 tablets by mouth 2 times daily. 60 tablet 1    aspirin 81 MG tablet Take 81 mg by mouth daily.  multivitamin (THERAGRAN) per tablet Take 1 tablet by mouth daily.  Calcium Carbonate-Vitamin D (CALCIUM + D) 600-200 MG-UNIT TABS Take 600 mg by mouth. No current facility-administered medications for this visit.        Past Medical History:   Diagnosis Date    Bundle branch block     CAD (coronary artery disease)     Diabetes mellitus ([facility] [address]     Hyperlipidemia LDL goal < 100 8/24/2015    Spondylosis of lumbar region without myelopathy or radiculopathy 8/28/2018       Past Surgical History:   Procedure Laterality Date    ANESTHESIA NERVE BLOCK Bilateral 2/28/2019    BILATERAL L4-5 L5-S1 INTRA-ARTICULAR FACET STEROID INJECTION (CPT 88046) performed by Aparna Keenan DO at [address] [place] [address]  3/27/13    off-pump cabg x 3- Dr. Lexi Dobson CATH LAB PROCEDURE  3/26/13    FRACTURE SURGERY      NERVE BLOCK Bilateral 02/28/2019    lumbar intra-articular facet    NERVE BLOCK Bilateral 4/29/2021    BILATERAL MEDIAL BRANCH BLOCK AT L4-5 AND L5-S1 (CPT 17378) performed by Aparna Keenan DO at [facility] Bilateral 05/20/2021    B/L medial branch blocks at L4-5, L5-S1    OTHER SURGICAL HISTORY N/A 04/29/2021    BILATERAL MEDIAL BRANCH BLOCK L4-5 AND L5-S1     PAIN MANAGEMENT PROCEDURE Bilateral 5/20/2021    BILATERAL MEDIAL BRANCH BLOCKS AT L405 AND L5-S1 (CPT 94575) performed by Aparna Keenan DO at [facility]         Family History   Problem Relation Age of Onset    Heart Disease Mother    Edinson Lucio midline spine, SI joint sulcus, greater trochanters and TFL on both side. There was left lumbar paraspinal spasms. There were no trigger points. +Facet loading     Neurological Exam:  Strength:   R  L  Hip Flex  5  5  Knee Ext  5  5  Ankle dorsi  5  5  EHL   5 5  Ankle Plantar  5  5    Sensory:  Intact for light touch in all lower extremity dermatomes. Reflexes:   R  L  Patellar  (2+) (2+)  Ankle Jerk  (1+) (1+)    Gait is normal. Forward flexed posture      Imaging: (personally reviewed by me 06/07/21)  X-ray L spine      Findings:   AP and lateral views of the lumbar spine were obtained. Mild   multilevel disc space narrowing as well as facet joint narrowing with   subchondral sclerosis and spurring. No fracture.           Impression   Multilevel degenerative disc and degenerative joint   disease. MRI L Spine       T12-L1: Normal T12-L1       L1-L2: Noncompressive bulge and facet hypertrophy. No canal or   foraminal stenosis.       L2-L3: Degenerative disc height loss noted with noncompressive bulge. No central or foraminal stenosis.       L3-L4: Degenerative disc disease noted with noncompressive bulge. Mild   foraminal stenosis noted.       L4-L5: Facet hypertrophy and ligamentum flavum infolding. Small joint   effusion seen with mild disc bulge. Mild lateral recess and moderate   foraminal stenosis noted.       L5-S1: Facet hypertrophy noted with mild foraminal stenosis. No canal   stenosis.       SOFT TISSUES: There is mild increase edema seen in the paraspinal   musculature of the lower lumbar spine with no contusion or facet   abnormality.           Impression       1.  Mild edema in the lower lumbar paraspinal musculature may be   related to muscle strain. 2.  Multilevel degenerative disc disease as discussed. Findings most   severe at L4-5.             Impression:   Scott Rooney is a 81 y.o. male     1. Lumbar spondylosis        Plan:   · Had two positive diagnostic MBB.  Will proceed with right then left radiofrequency ablation at L4-5 and L5-S1. Procedure risks, benefits and alternatives were discussed. Patient would like to proceed. · Continue HEP     The patient was educated about the diagnosis, prognosis, indications, risks and benefits of treatment. An opportunity to ask questions was given to the patient and questions were answered. The patient agreed to proceed with the recommended treatment as described above.      Follow up after RFA       Jacey Hammonds DO, [facility]   Board Certified Physical Medicine and Rehabilitation

## 2021-06-10 RX ORDER — GABAPENTIN 100 MG/1
100 CAPSULE ORAL 2 TIMES DAILY
Qty: 60 CAPSULE | Refills: 2 | Status: SHIPPED
Start: 2021-06-10 | End: 2021-09-02 | Stop reason: SDUPTHER

## 2021-06-24 ENCOUNTER — HOSPITAL ENCOUNTER (OUTPATIENT)
Dept: OPERATING ROOM | Age: 86
Setting detail: OUTPATIENT SURGERY
Discharge: HOME | End: 2021-06-24
Attending: PHYSICAL MEDICINE & REHABILITATION
Payer: MEDICARE

## 2021-06-24 ENCOUNTER — TELEPHONE (OUTPATIENT)
Dept: PHYSICAL MEDICINE AND REHAB | Age: 86
End: 2021-06-24

## 2021-06-24 ENCOUNTER — HOSPITAL ENCOUNTER (OUTPATIENT)
Age: 86
Setting detail: OUTPATIENT SURGERY
Discharge: HOME | End: 2021-06-24
Attending: PHYSICAL MEDICINE & REHABILITATION | Admitting: PHYSICAL MEDICINE & REHABILITATION
Payer: MEDICARE

## 2021-06-24 VITALS
DIASTOLIC BLOOD PRESSURE: 60 MMHG | HEIGHT: 69 IN | HEART RATE: 66 BPM | BODY MASS INDEX: 30.07 KG/M2 | RESPIRATION RATE: 18 BRPM | SYSTOLIC BLOOD PRESSURE: 121 MMHG | WEIGHT: 203 LBS | OXYGEN SATURATION: 96 %

## 2021-06-24 DIAGNOSIS — M47.896 OTHER OSTEOARTHRITIS OF SPINE, LUMBAR REGION: ICD-10-CM

## 2021-06-24 PROCEDURE — 3600000005 HC SURGERY LEVEL 5 BASE: Performed by: PHYSICAL MEDICINE & REHABILITATION

## 2021-06-24 PROCEDURE — C1713 ANCHOR/SCREW BN/BN,TIS/BN: HCPCS | Performed by: PHYSICAL MEDICINE & REHABILITATION

## 2021-06-24 PROCEDURE — 7100000010 HC PHASE II RECOVERY - FIRST 15 MIN: Performed by: PHYSICAL MEDICINE & REHABILITATION

## 2021-06-24 PROCEDURE — 2500000003 HC RX 250 WO HCPCS: Performed by: PHYSICAL MEDICINE & REHABILITATION

## 2021-06-24 PROCEDURE — 64635 DESTROY LUMB/SAC FACET JNT: CPT | Performed by: PHYSICAL MEDICINE & REHABILITATION

## 2021-06-24 PROCEDURE — 64636 DESTROY L/S FACET JNT ADDL: CPT | Performed by: PHYSICAL MEDICINE & REHABILITATION

## 2021-06-24 PROCEDURE — 2709999900 HC NON-CHARGEABLE SUPPLY: Performed by: PHYSICAL MEDICINE & REHABILITATION

## 2021-06-24 PROCEDURE — 7100000011 HC PHASE II RECOVERY - ADDTL 15 MIN: Performed by: PHYSICAL MEDICINE & REHABILITATION

## 2021-06-24 PROCEDURE — 3209999900 FLUORO FOR SURGICAL PROCEDURES

## 2021-06-24 RX ORDER — LIDOCAINE HYDROCHLORIDE 10 MG/ML
INJECTION, SOLUTION EPIDURAL; INFILTRATION; INTRACAUDAL; PERINEURAL PRN
Status: DISCONTINUED | OUTPATIENT
Start: 2021-06-24 | End: 2021-06-24 | Stop reason: ALTCHOICE

## 2021-06-24 RX ORDER — LIDOCAINE HYDROCHLORIDE 20 MG/ML
INJECTION, SOLUTION EPIDURAL; INFILTRATION; INTRACAUDAL; PERINEURAL PRN
Status: DISCONTINUED | OUTPATIENT
Start: 2021-06-24 | End: 2021-06-24 | Stop reason: ALTCHOICE

## 2021-06-24 ASSESSMENT — PAIN - FUNCTIONAL ASSESSMENT
PAIN_FUNCTIONAL_ASSESSMENT: 0-10
PAIN_FUNCTIONAL_ASSESSMENT: PREVENTS OR INTERFERES SOME ACTIVE ACTIVITIES AND ADLS

## 2021-06-24 ASSESSMENT — PAIN SCALES - GENERAL
PAINLEVEL_OUTOF10: 0
PAINLEVEL_OUTOF10: 0

## 2021-06-24 ASSESSMENT — PAIN DESCRIPTION - DESCRIPTORS: DESCRIPTORS: ACHING

## 2021-06-24 NOTE — TELEPHONE ENCOUNTER
Pt called in stating he is suppose to schedule an appt for three weeks from 6/24/21 for a follow up. First available is 7/26/21, is it okay for the pt to wait that long? Please, advise. Thank you.

## 2021-06-24 NOTE — OP NOTE
Dayna Gaitan    6/24/2021      PRE-OPERATIVE DIAGNOSIS:  Lumbar spondylosis,  lumbar facet arthropathy, lumbosacral OA. POST-OPERATIVE DIAGNOSIS:  Lumbar spondylosis, lumbar facet arthropathy, lumbosacral OA. PROCEDURE:  Fluoroscopic-guided Right  lumbar medial branch neurolysis at  levels L4-5, L5-S1    SURGEON:    Aparna Keenan DO    ANESTHESIA:   Local    ESTIMATED BLOOD LOSS:  None.  ______________________________________________________________________    HISTORY & PHYSICAL:   See separate H&P    PROCEDURE:  After confirming written and informed consent, Aniyah Jensen was brought to the procedure room and placed in the prone position. Blood pressure, heart rate, O2 saturation, and visual and verbal monitoring were established. A time-out was performed and his name and date of birth, the procedure to be performed as well as the plan for the location of the needle insertion were confirmed. Fluoroscopy was utilized to delineate the anatomy of the lumbar spine. Surface landmarks were identified as well. After prep and drape, an oblique fluoroscopic view was created to optimize visualization of the junction of the transverse process and the pedicle. After infiltration of local, # 20-gauge 100-mm 10- mm active tip radiofrequency ablation needle was passed to position the tip at the junction of the superior articular process and the transverse process, along the course of the medial branch. Satisfactory needle placement was confirmed by AP, lateral and oblique projections. At the sacral ala level, the sacral alar region was visualized and the needle tip was positioned on the sacral ala at the base of the superior articulating process where the medial branch traverses. Satisfactory needle placement was confirmed by AP, lateral and oblique projections. Sensory and motor testing was then performed. He  then received 0.75 cc of 1% PF xylocaine at each level.        Radiofrequency thermal ablation was then performed at 80 degree Celsius for 90 seconds. Phoenix was comfortable throughout the ablation. No complications were noted. In summary, medial branch neurolysis were performed at the following levels; right L4-5, L5-S1. Negative aspiration was noted prior to each injection. The needle was removed intact and dressings were applied. Phoenix was transferred to the recovery area. He was monitored, reassessed and discharged after an appropriate observatory period. No complications were noted. Phoenix will follow up in the office as scheduled. He was encouraged to call with questions, concerns or if worsening of symptoms occurs.       BobbiPlDO guilherme, [facility]   Board Certified Physical Medicine and Rehabilitation

## 2021-06-24 NOTE — H&P
Anabell Roby, [address] [facility] Physical Medicine and Rehabilitation  [address] [facility]  Phone: [phone]  Fax: [phone]     PCP: David Wells MD  Date of visit: 6/7/21         Chief Complaint   Patient presents with    Lower Back Pain         Interval:   Patient presents today for office visit regarding low back pain. He underwent second bilateral L4-5 and L5-S1 MBB and reports 100% relief for the day of the procedure. The pain has returned. The pain is rated Pain Score:   4, is described as achy, located in the low back without radiating. Worse with walking and standing. Better with sitting. There is no associated numbness/tingling. There is no weakness. There is no bowel/bladder changes.      The prior workup has included: Xray, MRI      The prior treatment has included:  PT: completed  Chiropractic: none    Modalities: none   OTC Tylenol: yes with relief   NSAIDS: CAD.   mobic currently with relief, voltaren gel with some relief    Opioids: none    Membrane stabilizers: none   Muscle relaxers: none    Previous injections: bilateral L4-5 and L5-S1 facet injections- 90% relief    Bilateral MBB L4-5 and L5-S1- 100% relief twice  Previous surgery at this site: none        No Known Allergies            Current Outpatient Medications   Medication Sig Dispense Refill    levothyroxine (SYNTHROID) 50 MCG tablet TAKE ONE TABLET BY MOUTH DAILY 90 tablet 1    tamsulosin (FLOMAX) 0.4 MG capsule Take 1 capsule by mouth daily 90 capsule 1    blood glucose test strips (ONE TOUCH ULTRA TEST) strip Check blood glucose qam 100 each 12    docusate sodium (COLACE) 100 MG capsule Take 100 mg by mouth daily        ONE TOUCH ULTRASOFT LANCETS MISC 1 each by Does not apply route daily 100 each 12    Lancet Device MISC Use daily for glucose monitoring 1 Device 0    Blood Glucose Monitoring Suppl (ONE TOUCH ULTRA 2) w/Device KIT Use daily for glucose monitoring 1 kit 0    ONE TOUCH LANCETS MISC Check blood glucose qam 100 each 12    Handicap Placard [facility] by Does not apply route Unable to ambulate more than 40 feet without difficulty  Expires 2/28/2022 1 each 0    atorvastatin (LIPITOR) 10 MG tablet Take 10 mg by mouth daily         metoprolol (LOPRESSOR) 25 MG tablet Take 0.5 tablets by mouth 2 times daily.  60 tablet 1    aspirin 81 MG tablet Take 81 mg by mouth daily.        multivitamin (THERAGRAN) per tablet Take 1 tablet by mouth daily.        Calcium Carbonate-Vitamin D (CALCIUM + D) 600-200 MG-UNIT TABS Take 600 mg by mouth.          No current facility-administered medications for this visit.              Past Medical History:   Diagnosis Date    Bundle branch block      CAD (coronary artery disease)      Diabetes mellitus ([facility] [address]      Hyperlipidemia LDL goal < 100 8/24/2015    Spondylosis of lumbar region without myelopathy or radiculopathy 8/28/2018               Past Surgical History:   Procedure Laterality Date    ANESTHESIA NERVE BLOCK Bilateral 2/28/2019     BILATERAL L4-5 L5-S1 INTRA-ARTICULAR FACET STEROID INJECTION (CPT 04034) performed by Maybell Severe, DO at [address] [place] [address]   3/27/13     off-pump cabg x 3- Dr. Cooper Sabillon CATH LAB PROCEDURE   3/26/13    FRACTURE SURGERY        NERVE BLOCK Bilateral 02/28/2019     lumbar intra-articular facet    NERVE BLOCK Bilateral 4/29/2021     BILATERAL MEDIAL BRANCH BLOCK AT L4-5 AND L5-S1 (CPT 85119) performed by Maybell Severe, DO at [facility] Bilateral 05/20/2021     B/L medial branch blocks at L4-5, L5-S1    OTHER SURGICAL HISTORY N/A 04/29/2021     BILATERAL MEDIAL BRANCH BLOCK L4-5 AND L5-S1     PAIN MANAGEMENT PROCEDURE Bilateral 5/20/2021     BILATERAL MEDIAL BRANCH BLOCKS AT L405 AND L5-S1 (CPT 87713) performed by Maybell Severe, DO at [facility] erythema. Palpation: Palpatory exam revealed tenderness along lumbosacral paraspinals, no ttp midline spine, SI joint sulcus, greater trochanters and TFL on both side. There was left lumbar paraspinal spasms. There were no trigger points. +Facet loading      Neurological Exam:  Strength:                     R          L  Hip Flex                       5          5  Knee Ext                     5          5  Ankle dorsi                  5          5  EHL                             5          5  Ankle Plantar               5          5     Sensory:  Intact for light touch in all lower extremity dermatomes.       Reflexes:                     R          L  Patellar                        (2+)      (2+)  Ankle Jerk                   (1+)      (1+)     Gait is normal. Forward flexed posture        Imaging: (personally reviewed by me 06/07/21)  X-ray L spine       Findings:   AP and lateral views of the lumbar spine were obtained. Mild   multilevel disc space narrowing as well as facet joint narrowing with   subchondral sclerosis and spurring. No fracture.           Impression   Multilevel degenerative disc and degenerative joint   disease.      MRI L Spine       T12-L1: Normal T12-L1       L1-L2: Noncompressive bulge and facet hypertrophy. No canal or   foraminal stenosis.       L2-L3: Degenerative disc height loss noted with noncompressive bulge. No central or foraminal stenosis.       L3-L4: Degenerative disc disease noted with noncompressive bulge. Mild   foraminal stenosis noted.       L4-L5: Facet hypertrophy and ligamentum flavum infolding. Small joint   effusion seen with mild disc bulge. Mild lateral recess and moderate   foraminal stenosis noted.       L5-S1: Facet hypertrophy noted with mild foraminal stenosis.  No canal   stenosis.       SOFT TISSUES: There is mild increase edema seen in the paraspinal   musculature of the lower lumbar spine with no contusion or facet   abnormality.           Impression     1.  Mild edema in the lower lumbar paraspinal musculature may be   related to muscle strain. 2.  Multilevel degenerative disc disease as discussed. Findings most   severe at L4-5.                Impression:   Reese Dean is a 81 y.o. male      1. Lumbar spondylosis          Plan:   · Had two positive diagnostic MBB. Will proceed with right then left radiofrequency ablation at L4-5 and L5-S1. Procedure risks, benefits and alternatives were discussed. Patient would like to proceed. · Continue HEP      · The patient was educated about the diagnosis, prognosis, indications, risks and benefits of treatment. An opportunity to ask questions was given to the patient and questions were answered.   The patient agreed to proceed with the recommended treatment as described above.      · Follow up after RFA         Fabiana Pham DO, [facility]   Board Certified Physical Medicine and Rehabilitation

## 2021-07-07 ENCOUNTER — PREP FOR PROCEDURE (OUTPATIENT)
Dept: PHYSICAL MEDICINE AND REHAB | Age: 86
End: 2021-07-07

## 2021-07-08 ENCOUNTER — HOSPITAL ENCOUNTER (OUTPATIENT)
Age: 86
Setting detail: OUTPATIENT SURGERY
Discharge: HOME | End: 2021-07-08
Attending: PHYSICAL MEDICINE & REHABILITATION | Admitting: PHYSICAL MEDICINE & REHABILITATION
Payer: MEDICARE

## 2021-07-08 VITALS
OXYGEN SATURATION: 98 % | HEART RATE: 52 BPM | RESPIRATION RATE: 16 BRPM | BODY MASS INDEX: 30.07 KG/M2 | HEIGHT: 69 IN | WEIGHT: 203 LBS | SYSTOLIC BLOOD PRESSURE: 115 MMHG | DIASTOLIC BLOOD PRESSURE: 56 MMHG

## 2021-07-08 DIAGNOSIS — M51.9 LUMBAR DISC DISEASE: ICD-10-CM

## 2021-07-08 PROCEDURE — 2500000003 HC RX 250 WO HCPCS: Performed by: PHYSICAL MEDICINE & REHABILITATION

## 2021-07-08 PROCEDURE — 7100000011 HC PHASE II RECOVERY - ADDTL 15 MIN: Performed by: PHYSICAL MEDICINE & REHABILITATION

## 2021-07-08 PROCEDURE — 3600000015 HC SURGERY LEVEL 5 ADDTL 15MIN: Performed by: PHYSICAL MEDICINE & REHABILITATION

## 2021-07-08 PROCEDURE — 64635 DESTROY LUMB/SAC FACET JNT: CPT | Performed by: PHYSICAL MEDICINE & REHABILITATION

## 2021-07-08 PROCEDURE — 64636 DESTROY L/S FACET JNT ADDL: CPT | Performed by: PHYSICAL MEDICINE & REHABILITATION

## 2021-07-08 PROCEDURE — C1713 ANCHOR/SCREW BN/BN,TIS/BN: HCPCS | Performed by: PHYSICAL MEDICINE & REHABILITATION

## 2021-07-08 PROCEDURE — 2709999900 HC NON-CHARGEABLE SUPPLY: Performed by: PHYSICAL MEDICINE & REHABILITATION

## 2021-07-08 PROCEDURE — 3600000005 HC SURGERY LEVEL 5 BASE: Performed by: PHYSICAL MEDICINE & REHABILITATION

## 2021-07-08 PROCEDURE — 7100000010 HC PHASE II RECOVERY - FIRST 15 MIN: Performed by: PHYSICAL MEDICINE & REHABILITATION

## 2021-07-08 RX ORDER — LIDOCAINE HYDROCHLORIDE 10 MG/ML
INJECTION, SOLUTION EPIDURAL; INFILTRATION; INTRACAUDAL; PERINEURAL PRN
Status: DISCONTINUED | OUTPATIENT
Start: 2021-07-08 | End: 2021-07-08 | Stop reason: ALTCHOICE

## 2021-07-08 ASSESSMENT — PAIN - FUNCTIONAL ASSESSMENT: PAIN_FUNCTIONAL_ASSESSMENT: 0-10

## 2021-07-08 ASSESSMENT — PAIN SCALES - GENERAL
PAINLEVEL_OUTOF10: 0
PAINLEVEL_OUTOF10: 0

## 2021-07-08 NOTE — H&P
Jaye Martinez, [id number] [facility] Physical Medicine and Rehabilitation  [address] [facility]  Phone: [phone]  Fax: [phone]     PCP: Gisella Garcia MD  Date of visit: 6/7/21           Chief Complaint   Patient presents with    Lower Back Pain         Interval:   Patient presents today for office visit regarding low back pain. He underwent second bilateral L4-5 and L5-S1 MBB and reports 100% relief for the day of the procedure. The pain has returned.  The pain is rated Pain Score:   4, is described as achy, located in the low back without radiating. Worse with walking and standing. Better with sitting. There is no associated numbness/tingling. There is no weakness.  There is no bowel/bladder changes.      The prior workup has included: Xray, MRI      The prior treatment has included:  PT: completed  Chiropractic: none    Modalities: none   OTC Tylenol: yes with relief   NSAIDS: CAD.  mobic currently with relief, voltaren gel with some relief    Opioids: none    Membrane stabilizers: none   Muscle relaxers: none    Previous injections: bilateral L4-5 and L5-S1 facet injections- 90% relief    Bilateral MBB L4-5 and L5-S1- 100% relief twice  Previous surgery at this site: none        No Known Allergies                 Current Outpatient Medications   Medication Sig Dispense Refill    levothyroxine (SYNTHROID) 50 MCG tablet TAKE ONE TABLET BY MOUTH DAILY 90 tablet 1    tamsulosin (FLOMAX) 0.4 MG capsule Take 1 capsule by mouth daily 90 capsule 1    blood glucose test strips (ONE TOUCH ULTRA TEST) strip Check blood glucose qam 100 each 12    docusate sodium (COLACE) 100 MG capsule Take 100 mg by mouth daily        ONE TOUCH ULTRASOFT LANCETS MISC 1 each by Does not apply route daily 100 each 12    Lancet Device MISC Use daily for glucose monitoring 1 Device 0    Blood Glucose Monitoring Suppl (ONE TOUCH ULTRA 2) w/Device KIT Use daily for glucose monitoring 1 kit 0    ONE TOUCH LANCETS MISC Check blood glucose qam 100 each 12    Handicap Placard [facility] by Does not apply route Unable to ambulate more than 40 feet without difficulty  Expires 2/28/2022 1 each 0    atorvastatin (LIPITOR) 10 MG tablet Take 10 mg by mouth daily         metoprolol (LOPRESSOR) 25 MG tablet Take 0.5 tablets by mouth 2 times daily.  60 tablet 1    aspirin 81 MG tablet Take 81 mg by mouth daily.        multivitamin (THERAGRAN) per tablet Take 1 tablet by mouth daily.        Calcium Carbonate-Vitamin D (CALCIUM + D) 600-200 MG-UNIT TABS Take 600 mg by mouth.          No current facility-administered medications for this visit.                 Past Medical History:   Diagnosis Date    Bundle branch block      CAD (coronary artery disease)      Diabetes mellitus ([facility] [address]      Hyperlipidemia LDL goal < 100 8/24/2015    Spondylosis of lumbar region without myelopathy or radiculopathy 8/28/2018                   Past Surgical History:   Procedure Laterality Date    ANESTHESIA NERVE BLOCK Bilateral 2/28/2019     BILATERAL L4-5 L5-S1 INTRA-ARTICULAR FACET STEROID INJECTION (CPT 13371) performed by Marilyn Carbajal DO at [address] [place] [address]   3/27/13     off-pump cabg x 3- Dr. Jared Ho CATH LAB PROCEDURE   3/26/13    FRACTURE SURGERY        NERVE BLOCK Bilateral 02/28/2019     lumbar intra-articular facet    NERVE BLOCK Bilateral 4/29/2021     BILATERAL MEDIAL BRANCH BLOCK AT L4-5 AND L5-S1 (CPT 87918) performed by Marilyn Carbajal DO at [facility] Bilateral 05/20/2021     B/L medial branch blocks at L4-5, L5-S1    OTHER SURGICAL HISTORY N/A 04/29/2021     BILATERAL MEDIAL BRANCH BLOCK L4-5 AND L5-S1     PAIN MANAGEMENT PROCEDURE Bilateral 5/20/2021     BILATERAL MEDIAL BRANCH BLOCKS AT L405 AND L5-S1 (CPT 53037) performed by Marilyn Carbajal DO at [place] [address]                 Family History   Problem Relation Age of Onset    Heart Disease Mother      Heart Disease Father      Heart Disease Brother           Social History                Tobacco Use    Smoking status: Former Smoker       Years: 32.00       Quit date: 3/26/1987       Years since quittin.2    Smokeless tobacco: Former User   Substance Use Topics    Alcohol use: No       Alcohol/week: 0.0 standard drinks    Drug use: No         Functional Status:   The patient is able to ambulate and perform activities of daily living without the use of an assistive device.       Occupation: The patient is currently retired.       ROS:   Constitutional: Denies fevers, chills, night sweats, unintentional weight loss     Skin: Denies rash or skin changes     Eyes: Denies vision changes    Ears/Nose/Throat: Denies nasal congestion or sore throat     Respiratory: Denies SOB or cough     Cardiovascular: Denies CP, palpitations, edema      Gastrointestinal: Denies abdominal pain,  N/V, constipation, or diarrhea    Genitourinary: Denies urinary symptoms    Neurologic: See HPI.     MSK: See HPI.     Psychiatric: Denies sleep disturbance, anxiety, depression    Hematologic/Lymphatic/Immunologic: Denies bruising         Physical Exam:   Blood pressure 135/75, pulse 62, height 5' 9\" (1.753 m), weight 212 lb (96.2 kg). General: well developed and well nourished in no acute distress. Body habitus is normal  HEENT: No rhinorrhea, sneezing, yawning, or lacrimation. No scleral icterus or conjunctival injection. Resp: symmetrical chest expansion, unlabored breathing, respirations unlabored. CV: Heart rate is regular. Peripheral pulses are palpable  Lymph: No visible regional lymphadenopathy. Skin: No rashes or ecchymosis. Normal turgor. Psych: Mood is calm. Affect is normal.   Ext: No edema noted      MSK:   Back/Hip Exam:   Inspection: Pelvis was asymmetric. Lumbar lordotic curvature was decreased.  There was scoliosis present.  No ecchymoses or erythema. Palpation: Palpatory exam revealed tenderness along lumbosacral paraspinals, no ttp midline spine, SI joint sulcus, greater trochanters and TFL on both side. There was left lumbar paraspinal spasms. There were no trigger points.  +Facet loading      Neurological Exam:  Strength:                     R          L  Hip Flex                       5          5  Knee Ext                     5          8  Ankle dorsi                  5          6  EHL                             5          5  Ankle Plantar               5          5     Sensory:  Intact for light touch in all lower extremity dermatomes.       Reflexes:                     R          L  Patellar                        (2+)      (2+)  Ankle Jerk                   (1+)      (1+)     Gait is normal. Forward flexed posture        Imaging: (personally reviewed by me 06/07/21)  X-ray L spine       Findings:   AP and lateral views of the lumbar spine were obtained. Mild   multilevel disc space narrowing as well as facet joint narrowing with   subchondral sclerosis and spurring. No fracture.           Impression   Multilevel degenerative disc and degenerative joint   disease.      MRI L Spine       T12-L1: Normal T12-L1       L1-L2: Noncompressive bulge and facet hypertrophy. No canal or   foraminal stenosis.       L2-L3: Degenerative disc height loss noted with noncompressive bulge. No central or foraminal stenosis.       L3-L4: Degenerative disc disease noted with noncompressive bulge. Mild   foraminal stenosis noted.       L4-L5: Facet hypertrophy and ligamentum flavum infolding. Small joint   effusion seen with mild disc bulge. Mild lateral recess and moderate   foraminal stenosis noted.       L5-S1: Facet hypertrophy noted with mild foraminal stenosis.  No canal   stenosis.       SOFT TISSUES: There is mild increase edema seen in the paraspinal   musculature of the lower lumbar spine with no contusion or facet abnormality.           Impression       1.  Mild edema in the lower lumbar paraspinal musculature may be   related to muscle strain. 2.  Multilevel degenerative disc disease as discussed. Findings most   severe at L4-5.                Impression:   Dora Dempsey is a 81 y.o. male      1. Lumbar spondylosis          Plan:   · Had two positive diagnostic MBB. Will proceed with right then left radiofrequency ablation at L4-5 and L5-S1. Procedure risks, benefits and alternatives were discussed. Patient would like to proceed. · Continue HEP      · The patient was educated about the diagnosis, prognosis, indications, risks and benefits of treatment.  An opportunity to ask questions was given to the patient and questions were answered.  The patient agreed to proceed with the recommended treatment as described above.      · Follow up after RFA         Devaughn Rodriguez DO, [facility]   Board Certified Physical Medicine and Rehabilitation

## 2021-07-08 NOTE — OP NOTE
was then performed at 80 degree Celsius for 90 seconds. Diony Graff was comfortable throughout the ablation. No complications were noted. In summary, medial branch neurolysis were performed at the following levels; left L4-5, L5-S1. Negative aspiration was noted prior to each injection. The needle was removed intact and dressings were applied. Diony Graff was transferred to the recovery area. He was monitored, reassessed and discharged after an appropriate observatory period. No complications were noted. Diony Graff will follow up in the office as scheduled. He was encouraged to call with questions, concerns or if worsening of symptoms occurs.       Dickson Araiza DO, [facility]   Board Certified Physical Medicine and Rehabilitation

## 2021-07-21 ENCOUNTER — OFFICE VISIT (OUTPATIENT)
Dept: PHYSICAL MEDICINE AND REHAB | Age: 86
End: 2021-07-21
Payer: MEDICARE

## 2021-07-21 VITALS
HEIGHT: 69 IN | WEIGHT: 213 LBS | DIASTOLIC BLOOD PRESSURE: 88 MMHG | SYSTOLIC BLOOD PRESSURE: 153 MMHG | BODY MASS INDEX: 31.55 KG/M2 | HEART RATE: 79 BPM

## 2021-07-21 DIAGNOSIS — M47.816 LUMBAR SPONDYLOSIS: Primary | ICD-10-CM

## 2021-07-21 PROCEDURE — 99212 OFFICE O/P EST SF 10 MIN: CPT | Performed by: PHYSICAL MEDICINE & REHABILITATION

## 2021-07-21 NOTE — PROGRESS NOTES
John Avina, [address] [facility] Physical Medicine and Rehabilitation  [address] [facility]  Phone: [phone]  Fax: [phone]    PCP: Riana Hillman MD  Date of visit: 7/21/21    Chief Complaint   Patient presents with    Back Pain     follow up RFA       Interval:   Patient presents today for follow up after undergoing bilateral lumbar RFA 2 weeks ago. He reports very good relief so far. He states if he feels like this, he would be good. There were no complications. The pain is rated Pain Score:   1 . He is trying to be more aware of his posture and standing up straight. The prior workup has included: Xray, MRI     The prior treatment has included:  PT: completed  Chiropractic: none    Modalities: none   OTC Tylenol: yes with relief   NSAIDS: CAD. mobic currently with relief, voltaren gel with some relief   Opioids: none    Membrane stabilizers: none   Muscle relaxers: none    Previous injections: bilateral L4-5 and L5-S1 facet injections- 90% relief   Bilateral MBB L4-5 and L5-S1- 100% relief twice  Bilateral L4-5 and L5-S1 RFA  Previous surgery at this site: none      No Known Allergies    Current Outpatient Medications   Medication Sig Dispense Refill    gabapentin (NEURONTIN) 100 MG capsule Take 1 capsule by mouth 2 times daily.  60 capsule 2    levothyroxine (SYNTHROID) 50 MCG tablet TAKE ONE TABLET BY MOUTH DAILY 90 tablet 1    tamsulosin (FLOMAX) 0.4 MG capsule Take 1 capsule by mouth daily 90 capsule 1    blood glucose test strips (ONE TOUCH ULTRA TEST) strip Check blood glucose qam 100 each 12    docusate sodium (COLACE) 100 MG capsule Take 100 mg by mouth daily      ONE TOUCH ULTRASOFT LANCETS MISC 1 each by Does not apply route daily 100 each 12    Lancet Device MISC Use daily for glucose monitoring 1 Device 0    Blood Glucose Monitoring Suppl (ONE TOUCH ULTRA 2) w/Device KIT Use daily for glucose monitoring 1 kit 0    ONE TOUCH LANCETS MISC Check blood glucose qam 100 each 12    Handicap Placard MISC by Does not apply route Unable to ambulate more than 40 feet without difficulty  Expires 2/28/2022 1 each 0    atorvastatin (LIPITOR) 10 MG tablet Take 10 mg by mouth daily       metoprolol (LOPRESSOR) 25 MG tablet Take 0.5 tablets by mouth 2 times daily. (Patient taking differently: Take 25 mg by mouth daily ) 60 tablet 1    aspirin 81 MG tablet Take 81 mg by mouth daily.  multivitamin (THERAGRAN) per tablet Take 1 tablet by mouth daily.  Calcium Carbonate-Vitamin D (CALCIUM + D) 600-200 MG-UNIT TABS Take 600 mg by mouth. No current facility-administered medications for this visit.        Past Medical History:   Diagnosis Date    Bundle branch block     CAD (coronary artery disease)     Diabetes mellitus ([facility] [address]     Hyperlipidemia LDL goal < 100 8/24/2015    Spondylosis of lumbar region without myelopathy or radiculopathy 8/28/2018       Past Surgical History:   Procedure Laterality Date    ANESTHESIA NERVE BLOCK Bilateral 2/28/2019    BILATERAL L4-5 L5-S1 INTRA-ARTICULAR FACET STEROID INJECTION (CPT 28488) performed by [employer], DO at [address] [place] [address] Rd  3/27/13    off-pump cabg x 3- Dr. Leona Archibald CATH LAB PROCEDURE  3/26/13    FRACTURE SURGERY      NERVE BLOCK Bilateral 02/28/2019    lumbar intra-articular facet    NERVE BLOCK Bilateral 4/29/2021    BILATERAL MEDIAL BRANCH BLOCK AT L4-5 AND L5-S1 (CPT 70840) performed by [employer] DO at [facility] Bilateral 05/20/2021    B/L medial branch blocks at L4-5, L5-S1    OTHER SURGICAL HISTORY N/A 04/29/2021    BILATERAL MEDIAL BRANCH BLOCK L4-5 AND L5-S1     OTHER SURGICAL HISTORY Right 06/24/2021    L4-5, L5-S-1 : Medial branch neurolysisi radiofrequency ablation    PAIN MANAGEMENT PROCEDURE Bilateral 5/20/2021    BILATERAL MEDIAL BRANCH BLOCKS AT L405 AND L5-S1 (CPT normal  HEENT: No rhinorrhea, sneezing, yawning, or lacrimation. No scleral icterus or conjunctival injection. Resp: symmetrical chest expansion, unlabored breathing, respirations unlabored. CV: Heart rate is regular. Peripheral pulses are palpable  Lymph: No visible regional lymphadenopathy. Skin: No rashes or ecchymosis. Normal turgor. Psych: Mood is calm. Affect is normal.   Ext: No edema noted     MSK:   Back/Hip Exam:   Inspection: Pelvis was asymmetric. Lumbar lordotic curvature was decreased. There was scoliosis present. No ecchymoses or erythema. Palpation: Palpatory exam revealed minimal tenderness along lumbosacral paraspinals    Neurological Exam:  Gait is slow. Forward flexed posture      Impression:   Dora Dempsey is a 81 y.o. male     1. Lumbar spondylosis        Plan:   · Good response following bilateral L4-5 and L5-S1 RFA so far. Explained he could see improvement even up to 6 weeks following the procedure. · Adjusted cane height for him. · Continue HEP     The patient was educated about the diagnosis, prognosis, indications, risks and benefits of treatment. An opportunity to ask questions was given to the patient and questions were answered. The patient agreed to proceed with the recommended treatment as described above.      Follow up in 2 months or PRN     Devaughn Rodriguez DO, [facility]   Board Certified Physical Medicine and Rehabilitation

## 2021-09-02 ENCOUNTER — OFFICE VISIT (OUTPATIENT)
Dept: FAMILY MEDICINE CLINIC | Age: 86
End: 2021-09-02
Payer: MEDICARE

## 2021-09-02 VITALS
OXYGEN SATURATION: 99 % | HEIGHT: 69 IN | WEIGHT: 206 LBS | HEART RATE: 75 BPM | SYSTOLIC BLOOD PRESSURE: 122 MMHG | BODY MASS INDEX: 30.51 KG/M2 | DIASTOLIC BLOOD PRESSURE: 74 MMHG | RESPIRATION RATE: 20 BRPM

## 2021-09-02 DIAGNOSIS — N13.8 BPH WITH OBSTRUCTION/LOWER URINARY TRACT SYMPTOMS: ICD-10-CM

## 2021-09-02 DIAGNOSIS — N40.1 BPH WITH OBSTRUCTION/LOWER URINARY TRACT SYMPTOMS: ICD-10-CM

## 2021-09-02 DIAGNOSIS — E03.9 ACQUIRED HYPOTHYROIDISM: ICD-10-CM

## 2021-09-02 DIAGNOSIS — E11.9 TYPE 2 DIABETES MELLITUS WITHOUT COMPLICATION, WITHOUT LONG-TERM CURRENT USE OF INSULIN (HCC): Primary | ICD-10-CM

## 2021-09-02 DIAGNOSIS — M47.816 SPONDYLOSIS OF LUMBAR REGION WITHOUT MYELOPATHY OR RADICULOPATHY: ICD-10-CM

## 2021-09-02 LAB
CREATININE URINE POCT: NORMAL
HBA1C MFR BLD: 5.9 %
MICROALBUMIN/CREAT 24H UR: NORMAL MG/G{CREAT}
MICROALBUMIN/CREAT UR-RTO: NORMAL

## 2021-09-02 PROCEDURE — 99214 OFFICE O/P EST MOD 30 MIN: CPT | Performed by: FAMILY MEDICINE

## 2021-09-02 PROCEDURE — 83036 HEMOGLOBIN GLYCOSYLATED A1C: CPT | Performed by: FAMILY MEDICINE

## 2021-09-02 PROCEDURE — 82044 UR ALBUMIN SEMIQUANTITATIVE: CPT | Performed by: FAMILY MEDICINE

## 2021-09-02 RX ORDER — TAMSULOSIN HYDROCHLORIDE 0.4 MG/1
0.4 CAPSULE ORAL DAILY
Qty: 90 CAPSULE | Refills: 1 | Status: SHIPPED
Start: 2021-09-02 | End: 2022-03-02 | Stop reason: SDUPTHER

## 2021-09-02 RX ORDER — LEVOTHYROXINE SODIUM 0.05 MG/1
TABLET ORAL
Qty: 90 TABLET | Refills: 1 | Status: SHIPPED
Start: 2021-09-02 | End: 2022-03-02 | Stop reason: SDUPTHER

## 2021-09-02 RX ORDER — LANCETS
EACH MISCELLANEOUS
Qty: 50 EACH | Refills: 12 | Status: SHIPPED
Start: 2021-09-02 | End: 2022-09-25 | Stop reason: SDUPTHER

## 2021-09-02 RX ORDER — GABAPENTIN 100 MG/1
100 CAPSULE ORAL 2 TIMES DAILY
Qty: 180 CAPSULE | Refills: 1 | Status: SHIPPED
Start: 2021-09-02 | End: 2022-03-02 | Stop reason: SDUPTHER

## 2021-09-02 ASSESSMENT — ENCOUNTER SYMPTOMS
DIARRHEA: 0
VOMITING: 0
NAUSEA: 0
SHORTNESS OF BREATH: 0

## 2021-09-02 NOTE — PATIENT INSTRUCTIONS
Patient Education        Learning About Carbohydrate (Carb) Counting and Eating Out When You Have Diabetes  Why plan your meals? Meal planning can be a key part of managing diabetes. Planning meals and snacks with the right balance of carbohydrate, protein, and fat can help you keep your blood sugar at the target level you set with your doctor. You don't have to eat special foods. You can eat what your family eats, including sweets once in a while. But you do have to pay attention to how often you eat and how much you eat of certain foods. You may want to work with a dietitian or a certified diabetes educator. He or she can give you tips and meal ideas and can answer your questions about meal planning. This health professional can also help you reach a healthy weight if that is one of your goals. What should you know about eating carbs? Managing the amount of carbohydrate (carbs) you eat is an important part of healthy meals when you have diabetes. Carbohydrate is found in many foods. · Learn which foods have carbs. And learn the amounts of carbs in different foods. ? Bread, cereal, pasta, and rice have about 15 grams of carbs in a serving. A serving is 1 slice of bread (1 ounce), ½ cup of cooked cereal, or 1/3 cup of cooked pasta or rice. ? Fruits have 15 grams of carbs in a serving. A serving is 1 small fresh fruit, such as an apple or orange; ½ of a banana; ½ cup of cooked or canned fruit; ½ cup of fruit juice; 1 cup of melon or raspberries; or 2 tablespoons of dried fruit. ? Milk and no-sugar-added yogurt have 15 grams of carbs in a serving. A serving is 1 cup of milk or 2/3 cup of no-sugar-added yogurt. ? Starchy vegetables have 15 grams of carbs in a serving. A serving is ½ cup of mashed potatoes or sweet potato; 1 cup winter squash; ½ of a small baked potato; ½ cup of cooked beans; or ½ cup cooked corn or green peas.   · Learn how much carbs to eat each day and at each meal. A dietitian or CDE can teach you how to keep track of the amount of carbs you eat. This is called carbohydrate counting. · If you are not sure how to count carbohydrate grams, use the Plate Method to plan meals. It is a good, quick way to make sure that you have a balanced meal. It also helps you spread carbs throughout the day. ? Divide your plate by types of foods. Put non-starchy vegetables on half the plate, meat or other protein food on one-quarter of the plate, and a grain or starchy vegetable in the final quarter of the plate. To this you can add a small piece of fruit and 1 cup of milk or yogurt, depending on how many carbs you are supposed to eat at a meal.  · Try to eat about the same amount of carbs at each meal. Do not \"save up\" your daily allowance of carbs to eat at one meal.  · Proteins have very little or no carbs per serving. Examples of proteins are beef, chicken, turkey, fish, eggs, tofu, cheese, cottage cheese, and peanut butter. A serving size of meat is 3 ounces, which is about the size of a deck of cards. Examples of meat substitute serving sizes (equal to 1 ounce of meat) are 1/4 cup of cottage cheese, 1 egg, 1 tablespoon of peanut butter, and ½ cup of tofu. How can you eat out and still eat healthy? · Learn to estimate the serving sizes of foods that have carbohydrate. If you measure food at home, it will be easier to estimate the amount in a serving of restaurant food. · If the meal you order has too much carbohydrate (such as potatoes, corn, or baked beans), ask to have a low-carbohydrate food instead. Ask for a salad or green vegetables. · If you use insulin, check your blood sugar before and after eating out to help you plan how much to eat in the future. · If you eat more carbohydrate at a meal than you had planned, take a walk or do other exercise. This will help lower your blood sugar. What are some tips for eating healthy? · Limit saturated fat, such as the fat from meat and dairy products. This is a healthy choice because people who have diabetes are at higher risk of heart disease. So choose lean cuts of meat and nonfat or low-fat dairy products. Use olive or canola oil instead of butter or shortening when cooking. · Don't skip meals. Your blood sugar may drop too low if you skip meals and take insulin or certain medicines for diabetes. · Check with your doctor before you drink alcohol. Alcohol can cause your blood sugar to drop too low. Alcohol can also cause a bad reaction if you take certain diabetes medicines. Follow-up care is a key part of your treatment and safety. Be sure to make and go to all appointments, and call your doctor if you are having problems. It's also a good idea to know your test results and keep a list of the medicines you take. Where can you learn more? Go to https://[employer].ClickHome. org and sign in to your [employer] account. Enter V925 in the [employer] box to learn more about \"Learning About Carbohydrate (Carb) Counting and Eating Out When You Have Diabetes. \"     If you do not have an account, please click on the \"Sign Up Now\" link. Current as of: August 31, 2020               Content Version: 12.9  © 2006-2021 [employer]wise, Incorporated. Care instructions adapted under license by [facility] ([facility]). If you have questions about a medical condition or this instruction, always ask your healthcare professional. [place] [address] any warranty or liability for your use of this information.

## 2021-09-02 NOTE — PROGRESS NOTES
OFFICE PROGRESS NOTE      SUBJECTIVE:        Patient ID:   Lillian Araujo is a 81 y.o. male whopresents for   Chief Complaint   Patient presents with    Diabetes           HPI:   Patient is here to follow up on diabetes. Fasting blood sugars: Midday blood sugars: not checking. Patient checks blood glucose 1 times per day. Patient is following diabetic diet. Patient is a smoker/nonsmoker. Last ophthalmology visit: 8/2021. Patient is taking a daily statin. Recent lab results reviewed including CMP, CBC, and TSH which are remarkable for hyperkalemia, mildly elevated TSH. Last urine microalbumin: Today      Prior to Admission medications    Medication Sig Start Date End Date Taking? Authorizing Provider   gabapentin (NEURONTIN) 100 MG capsule Take 1 capsule by mouth 2 times daily.  9/2/21 9/2/22 Yes Dona Amato MD   tamsulosin (FLOMAX) 0.4 MG capsule Take 1 capsule by mouth daily 9/2/21  Yes Dona Amato MD   levothyroxine (SYNTHROID) 50 MCG tablet TAKE ONE TABLET BY MOUTH DAILY 9/2/21  Yes Dona Amato MD   blood glucose test strips (ONE TOUCH ULTRA TEST) strip Check blood glucose qam 9/2/21  Yes Dona Amato MD   ONE TOUCH ULTRASOFT LANCETS MISC Check blood sugar qam 9/2/21  Yes Dona Amato MD   docusate sodium (COLACE) 100 MG capsule Take 100 mg by mouth daily   Yes Historical Provider, MD   Lancet Device MISC Use daily for glucose monitoring 3/9/20  Yes Dona Amato MD   Blood Glucose Monitoring Suppl (ONE TOUCH ULTRA 2) w/Device KIT Use daily for glucose monitoring 3/9/20  Yes Dona Amato MD   [facility] LANCETS MISC Check blood glucose qam 9/16/19  Yes Dona Amato MD   Handicap Placard MISC by Does not apply route Unable to ambulate more than 40 feet without difficulty  Expires 2/28/2022 2/27/17  Yes Dona Amato MD   atorvastatin (LIPITOR) 10 MG tablet Take 10 mg by mouth daily  6/12/15  Yes Historical Provider, MD   metoprolol (LOPRESSOR) 25 MG tablet Take 0.5 tablets by mouth 2 times daily. Patient taking differently: Take 25 mg by mouth daily  3/30/13  Yes Kyler Hernandez MD   aspirin 81 MG tablet Take 81 mg by mouth daily. Yes Historical Provider, MD   multivitamin [facility]) per tablet Take 1 tablet by mouth daily. Yes Historical Provider, MD   Calcium Carbonate-Vitamin D (CALCIUM + D) 600-200 MG-UNIT TABS Take 600 mg by mouth. Yes Historical Provider, MD     Social History     Socioeconomic History    Marital status:      Spouse name: None    Number of children: None    Years of education: None    Highest education level: None   Occupational History    None   Tobacco Use    Smoking status: Former Smoker     Years: 32.00     Quit date: 3/26/1987     Years since quittin.4    Smokeless tobacco: Former User   Vaping Use    Vaping Use: Never used   Substance and Sexual Activity    Alcohol use: No     Alcohol/week: 0.0 standard drinks    Drug use: No    Sexual activity: Yes     Partners: Female   Other Topics Concern    None   Social History Narrative    None     Social Determinants of Health     Financial Resource Strain:     Difficulty of Paying Living Expenses:    Food Insecurity: No Food Insecurity    Worried About Running Out of Food in the Last Year: Never true    Juanita of Food in the Last Year: Never true   Transportation Needs:     Lack of Transportation (Medical):      Lack of Transportation (Non-Medical):    Physical Activity:     Days of Exercise per Week:     Minutes of Exercise per Session:    Stress: No Stress Concern Present    Feeling of Stress : Not at all   Social Connections:     Frequency of Communication with Friends and Family:     Frequency of Social Gatherings with Friends and Family:     Attends Hoahaoism Services:     Active Member of Clubs or Organizations:     Attends Club or Organization Meetings:     Marital Status:    Intimate Partner Violence:     Fear of Current or Ex-Partner:     Emotionally Abused:     Physically Abused:     Sexually Abused:        I have reviewed Hernando's allergies, medications, problem list, medical, social and family history and have updated as needed in the electronic medical record    Current Outpatient Medications   Medication Sig Dispense Refill    gabapentin (NEURONTIN) 100 MG capsule Take 1 capsule by mouth 2 times daily. 180 capsule 1    tamsulosin (FLOMAX) 0.4 MG capsule Take 1 capsule by mouth daily 90 capsule 1    levothyroxine (SYNTHROID) 50 MCG tablet TAKE ONE TABLET BY MOUTH DAILY 90 tablet 1    blood glucose test strips (ONE TOUCH ULTRA TEST) strip Check blood glucose qam 50 each 12    ONE TOUCH ULTRASOFT LANCETS MISC Check blood sugar qam 50 each 12    docusate sodium (COLACE) 100 MG capsule Take 100 mg by mouth daily      Lancet Device MISC Use daily for glucose monitoring 1 Device 0    Blood Glucose Monitoring Suppl (ONE TOUCH ULTRA 2) w/Device KIT Use daily for glucose monitoring 1 kit 0    ONE TOUCH LANCETS MISC Check blood glucose qam 100 each 12    Handicap Placard MISC by Does not apply route Unable to ambulate more than 40 feet without difficulty  Expires 2/28/2022 1 each 0    atorvastatin (LIPITOR) 10 MG tablet Take 10 mg by mouth daily       metoprolol (LOPRESSOR) 25 MG tablet Take 0.5 tablets by mouth 2 times daily. (Patient taking differently: Take 25 mg by mouth daily ) 60 tablet 1    aspirin 81 MG tablet Take 81 mg by mouth daily.  multivitamin (THERAGRAN) per tablet Take 1 tablet by mouth daily.  Calcium Carbonate-Vitamin D (CALCIUM + D) 600-200 MG-UNIT TABS Take 600 mg by mouth. No current facility-administered medications for this visit. Review Of Systems:    Review of Systems   Eyes: Negative for visual disturbance. Respiratory: Negative for shortness of breath.     Cardiovascular: Negative for chest pain, palpitations and leg swelling. Gastrointestinal: Negative for diarrhea, nausea and vomiting. Genitourinary: Positive for frequency. Negative for difficulty urinating and dysuria. Skin: Negative for rash. Psychiatric/Behavioral: Negative for dysphoric mood. OBJECTIVE:     VS:  Wt Readings from Last 3 Encounters:   09/02/21 206 lb (93.4 kg)   07/21/21 213 lb (96.6 kg)   07/07/21 203 lb (92.1 kg)     Vitals:    09/02/21 0831   BP: 122/74   Pulse: 75   Resp: 20   SpO2: 99%       Physical Exam  Vitals reviewed. Constitutional:       General: He is not in acute distress. Appearance: He is well-developed. Neck:      Vascular: No carotid bruit. Cardiovascular:      Rate and Rhythm: Normal rate and regular rhythm. Heart sounds: Normal heart sounds. No murmur heard. No gallop. Pulmonary:      Effort: Pulmonary effort is normal.      Breath sounds: Normal breath sounds. No wheezing or rales. Abdominal:      General: Bowel sounds are normal. There is no distension. Palpations: Abdomen is soft. Tenderness: There is no abdominal tenderness. Musculoskeletal:      Cervical back: Neck supple. Right lower leg: No edema. Left lower leg: No edema. Skin:     General: Skin is warm and dry. Neurological:      Mental Status: He is alert and oriented to person, place, and time.            Lab Results   Component Value Date    LABA1C 5.9 09/02/2021     No results found for: EAG   Lab Results   Component Value Date    WBC 5.5 05/24/2021    HGB 14.1 05/24/2021    HCT 44.5 05/24/2021    MCV 99.1 05/24/2021     05/24/2021      Lab Results   Component Value Date     05/24/2021    K 5.5 (H) 05/24/2021     05/24/2021    CO2 28 05/24/2021    BUN 18 05/24/2021    CREATININE 1.1 05/24/2021    GLUCOSE 109 (H) 05/24/2021    CALCIUM 9.4 05/24/2021    PROT 7.1 05/24/2021    LABALBU 4.1 05/24/2021    BILITOT 0.3 05/24/2021    ALKPHOS 66 05/24/2021    AST 20 05/24/2021    ALT 14 05/24/2021    LABGLOM >60 05/24/2021    GFRAA >60 05/24/2021        Lab Results   Component Value Date    TSH 5.670 (H) 05/24/2021      Lab Results   Component Value Date    CHOL 141 09/14/2020     Lab Results   Component Value Date    TRIG 114 09/14/2020     Lab Results   Component Value Date    HDL 40 09/14/2020     Lab Results   Component Value Date    LDLCALC 78 09/14/2020     Lab Results   Component Value Date    LABVLDL 23 09/14/2020     No results found for: David Reis was seen today for diabetes. Diagnoses and all orders for this visit:    Type 2 diabetes mellitus without complication, without long-term current use of insulin ([facility])  -     blood glucose test strips (ONE TOUCH ULTRA TEST) strip; Check blood glucose qam  -     ONE TOUCH ULTRASOFT LANCETS MISC; Check blood sugar qam  -     POCT Microalbumin  -     POCT glycosylated hemoglobin (Hb A1C)  -     Diabetic Foot Exam    Acquired hypothyroidism  -     levothyroxine (SYNTHROID) 50 MCG tablet; TAKE ONE TABLET BY MOUTH DAILY    Spondylosis of lumbar region without myelopathy or radiculopathy  -     gabapentin (NEURONTIN) 100 MG capsule; Take 1 capsule by mouth 2 times daily. BPH with obstruction/lower urinary tract symptoms  -     tamsulosin (FLOMAX) 0.4 MG capsule; Take 1 capsule by mouth daily        BMI was elevated today, and weight loss plan recommended is : conventional weight loss. Phone/MyChart follow up if tests abnormal.    Return in about 6 months (around 3/2/2022) for Annual Medicare Wellness Visit--30 minutes, diabetes. I have reviewed my findings and recommendations with Naz Maynard.     Britton Libman, MD, M.D

## 2021-09-05 PROBLEM — E03.9 ACQUIRED HYPOTHYROIDISM: Status: ACTIVE | Noted: 2021-09-05

## 2021-09-27 ENCOUNTER — OFFICE VISIT (OUTPATIENT)
Dept: PHYSICAL MEDICINE AND REHAB | Age: 86
End: 2021-09-27
Payer: MEDICARE

## 2021-09-27 VITALS
DIASTOLIC BLOOD PRESSURE: 73 MMHG | SYSTOLIC BLOOD PRESSURE: 129 MMHG | BODY MASS INDEX: 30.49 KG/M2 | WEIGHT: 205.9 LBS | HEART RATE: 76 BPM | HEIGHT: 69 IN

## 2021-09-27 DIAGNOSIS — M47.819 FACET ARTHROPATHY: ICD-10-CM

## 2021-09-27 DIAGNOSIS — M47.818 ARTHRITIS OF SACROILIAC JOINT: Primary | ICD-10-CM

## 2021-09-27 DIAGNOSIS — M47.816 LUMBAR SPONDYLOSIS: ICD-10-CM

## 2021-09-27 DIAGNOSIS — R26.9 IMPAIRED GAIT: ICD-10-CM

## 2021-09-27 PROCEDURE — 99214 OFFICE O/P EST MOD 30 MIN: CPT | Performed by: PHYSICAL MEDICINE & REHABILITATION

## 2021-09-27 NOTE — H&P
El[place] Ivan, [address] [facility]  [address] [facility]  Phone: [phone]  Fax: [phone]    PCP: Tami River MD  Date of visit: 9/27/21    Chief Complaint   Patient presents with    Back Pain     2 month follow up       Interval:   Patient presents today for follow up regarding low back pain. He reports a pain in the lower back, about the SI joints now. He states it feels different than the pain he had prior to lumbar RFA. He can't exactly describe it. it bothers him when he stands and walks. It does not radiate into his legs. The pain is rated Pain Score:   4. He denies numbness/tingling. He denies weakness. No bowel/bladder changes. The prior workup has included: Xray, MRI     The prior treatment has included:  PT: completed  Chiropractic: none    Modalities: none   OTC Tylenol: yes with relief   NSAIDS: CAD. mobic currently with relief, voltaren gel with some relief   Opioids: none    Membrane stabilizers: none   Muscle relaxers: none    Previous injections: bilateral L4-5 and L5-S1 facet injections- 90% relief   Bilateral MBB L4-5 and L5-S1- 100% relief twice  Bilateral L4-5 and L5-S1 RFA  Previous surgery at this site: none      No Known Allergies    Current Outpatient Medications   Medication Sig Dispense Refill    Handicap Placard MISC by Does not apply route Reason for disability:  Arthritis of sacroiliac joint  (primary encounter diagnosis)  Lumbar spondylosis  Facet arthropathy  Impaired gait    Duration: 5 years 1 each 0    gabapentin (NEURONTIN) 100 MG capsule Take 1 capsule by mouth 2 times daily.  180 capsule 1    tamsulosin (FLOMAX) 0.4 MG capsule Take 1 capsule by mouth daily 90 capsule 1    levothyroxine (SYNTHROID) 50 MCG tablet TAKE ONE TABLET BY MOUTH DAILY 90 tablet 1    blood glucose test strips (ONE TOUCH ULTRA TEST) strip Check blood glucose qam 50 each 12    ONE TOUCH ULTRASOFT LANCETS MISC Check blood sugar qam 50 each 12    docusate sodium (COLACE) 100 MG capsule Take 100 mg by mouth daily      Lancet Device MISC Use daily for glucose monitoring 1 Device 0    Blood Glucose Monitoring Suppl (ONE TOUCH ULTRA 2) w/Device KIT Use daily for glucose monitoring 1 kit 0    ONE TOUCH LANCETS MISC Check blood glucose qam 100 each 12    atorvastatin (LIPITOR) 10 MG tablet Take 10 mg by mouth daily       metoprolol (LOPRESSOR) 25 MG tablet Take 0.5 tablets by mouth 2 times daily. 60 tablet 1    aspirin 81 MG tablet Take 81 mg by mouth daily.  multivitamin (THERAGRAN) per tablet Take 1 tablet by mouth daily.  Calcium Carbonate-Vitamin D (CALCIUM + D) 600-200 MG-UNIT TABS Take 600 mg by mouth. No current facility-administered medications for this visit.        Past Medical History:   Diagnosis Date    Acquired hypothyroidism 9/5/2021    Bundle branch block     CAD (coronary artery disease)     Diabetes mellitus ([facility] [address]     Hyperlipidemia LDL goal < 100 8/24/2015    Spondylosis of lumbar region without myelopathy or radiculopathy 8/28/2018       Past Surgical History:   Procedure Laterality Date    ANESTHESIA NERVE BLOCK Bilateral 2/28/2019    BILATERAL L4-5 L5-S1 INTRA-ARTICULAR FACET STEROID INJECTION (CPT 99603) performed by Brenna Lopes DO at [address] [place] [address]  3/27/13    off-pump cabg x 3- Dr. Shah Smart CATH LAB PROCEDURE  3/26/13    FRACTURE SURGERY      NERVE BLOCK Bilateral 02/28/2019    lumbar intra-articular facet    NERVE BLOCK Bilateral 4/29/2021    BILATERAL MEDIAL BRANCH BLOCK AT L4-5 AND L5-S1 (CPT 17322) performed by Brenna Lopes DO at [facility] Bilateral 05/20/2021    B/L medial branch blocks at L4-5, L5-S1    OTHER SURGICAL HISTORY N/A 04/29/2021    BILATERAL MEDIAL BRANCH BLOCK L4-5 AND L5-S1     OTHER SURGICAL HISTORY Right 06/24/2021    L4-5, L5-S-1 : Medial branch neurolysisi radiofrequency ablation    PAIN MANAGEMENT PROCEDURE Bilateral 2021    BILATERAL MEDIAL BRANCH BLOCKS AT L405 AND L5-S1 (CPT 23675) performed by Daniel Bland DO at [address] Right 2021    FLUOROSCOPIC-GUIDED RIGHT LUMBAR MEDIAL BRANCH NEUROLYSIS (RADIOFREQUENCY ABLATION) AT LEVELS L4-5, L5-S1 performed by Daniel Bland DO at [address] Left 2021    FLUOROSCOPIC-GUIDED LEFT LUMBAR MEDIAL BRANCH NEUROLYSIS (RADIOFREQUENCY ABLATION) AT LEVELS L4-5, L5-S1 (CPT 23275) performed by Daniel Bland DO at [facility]         Family History   Problem Relation Age of Onset    Heart Disease Mother     Heart Disease Father     Heart Disease Brother        Social History     Tobacco Use    Smoking status: Former Smoker     Years: 32.00     Quit date: 3/26/1987     Years since quittin.5    Smokeless tobacco: Former User   Vaping Use    Vaping Use: Never used   Substance Use Topics    Alcohol use: No     Alcohol/week: 0.0 standard drinks    Drug use: No       Functional Status: The patient is able to ambulate and perform activities of daily living without the use of an assistive device. Occupation: The patient is currently retired. ROS:   Constitutional: Denies fevers, chills, night sweats, unintentional weight loss     Skin: Denies rash or skin changes     Eyes: Denies vision changes    Ears/Nose/Throat: Denies nasal congestion or sore throat     Respiratory: Denies SOB or cough     Cardiovascular: Denies CP, palpitations, edema      Gastrointestinal: Denies abdominal pain,  N/V, constipation, or diarrhea    Genitourinary: Denies urinary symptoms    Neurologic: See HPI.     MSK: See HPI.      Psychiatric: Denies sleep disturbance, anxiety, depression    Hematologic/Lymphatic/Immunologic: Denies bruising       Physical Exam:   Blood pressure 129/73, pulse 76, height 5' 9\" (1.753 m), weight 205 lb 14.4 oz (93.4 kg). General: well developed and well nourished in no acute distress. Body habitus is normal  HEENT: No rhinorrhea, sneezing, yawning, or lacrimation. No scleral icterus or conjunctival injection. Resp: symmetrical chest expansion, unlabored breathing, respirations unlabored. CV: Heart rate is regular. Peripheral pulses are palpable  Lymph: No visible regional lymphadenopathy. Skin: No rashes or ecchymosis. Normal turgor. Psych: Mood is calm. Affect is normal.   Ext: No edema noted     MSK:   Back/Hip Exam:   Inspection: Pelvis was asymmetric. Lumbar lordotic curvature was decreased. There was scoliosis present. No ecchymoses or erythema. Palpation: Palpatory exam revealed mild tenderness along lumbosacral paraspinals, no ttp midline spine, ttp bilateral SI joint sulcus, no ttp greater trochanters and TFL on both side. There was left lumbar paraspinal spasms. There were no trigger points. ROM Decreased. +RAFY   +Gaenslen's        Neurological Exam:  Strength:                     R          L  Hip Flex                       5          5  Knee Ext                     5          5  Ankle dorsi                  5          5  EHL                             5          5  Ankle Plantar               5          5     Sensory:  Intact for light touch in all lower extremity dermatomes.       Reflexes:                     R          L  Patellar                        (2+)      (2+)  Ankle Jerk                   (1+)      (1+)     Gait is slow. Forward flexed posture      Impression:   Yaima Banerjee is a 81 y.o. male     1. Arthritis of sacroiliac joint    2. Lumbar spondylosis    3. Facet arthropathy    4. Impaired gait        Plan:   · Patient would benefit from bilateral SI joint injections for diagnostic/therapeutic purposes. Procedure risks, benefits and alternatives were discussed. Patient would like to proceed.    · Handicap placard provided   · Continue HEP. Discussed proper posture techniques     The patient was educated about the diagnosis, prognosis, indications, risks and benefits of treatment. An opportunity to ask questions was given to the patient and questions were answered. The patient agreed to proceed with the recommended treatment as described above.      Follow up after injection     Ruiz Latham DO, [facility]   Board Certified Physical Medicine and Rehabilitation

## 2021-09-27 NOTE — PROGRESS NOTES
LANCETS MISC Check blood sugar qam 50 each 12    docusate sodium (COLACE) 100 MG capsule Take 100 mg by mouth daily      Lancet Device MISC Use daily for glucose monitoring 1 Device 0    Blood Glucose Monitoring Suppl (ONE TOUCH ULTRA 2) w/Device KIT Use daily for glucose monitoring 1 kit 0    ONE TOUCH LANCETS MISC Check blood glucose qam 100 each 12    atorvastatin (LIPITOR) 10 MG tablet Take 10 mg by mouth daily       metoprolol (LOPRESSOR) 25 MG tablet Take 0.5 tablets by mouth 2 times daily. 60 tablet 1    aspirin 81 MG tablet Take 81 mg by mouth daily.  multivitamin (THERAGRAN) per tablet Take 1 tablet by mouth daily.  Calcium Carbonate-Vitamin D (CALCIUM + D) 600-200 MG-UNIT TABS Take 600 mg by mouth. No current facility-administered medications for this visit.        Past Medical History:   Diagnosis Date    Acquired hypothyroidism 9/5/2021    Bundle branch block     CAD (coronary artery disease)     Diabetes mellitus ([facility] [address]     Hyperlipidemia LDL goal < 100 8/24/2015    Spondylosis of lumbar region without myelopathy or radiculopathy 8/28/2018       Past Surgical History:   Procedure Laterality Date    ANESTHESIA NERVE BLOCK Bilateral 2/28/2019    BILATERAL L4-5 L5-S1 INTRA-ARTICULAR FACET STEROID INJECTION (CPT 69748) performed by John Avina DO at [address] [place] [address]  3/27/13    off-pump cabg x 3- Dr. Juliet Baig CATH LAB PROCEDURE  3/26/13    FRACTURE SURGERY      NERVE BLOCK Bilateral 02/28/2019    lumbar intra-articular facet    NERVE BLOCK Bilateral 4/29/2021    BILATERAL MEDIAL BRANCH BLOCK AT L4-5 AND L5-S1 (CPT 70568) performed by John Avina DO at [facility] Bilateral 05/20/2021    B/L medial branch blocks at L4-5, L5-S1    OTHER SURGICAL HISTORY N/A 04/29/2021    BILATERAL MEDIAL BRANCH BLOCK L4-5 AND L5-S1     OTHER SURGICAL HISTORY Right 06/24/2021    L4-5, L5-S-1 : Medial branch neurolysisi radiofrequency ablation    PAIN MANAGEMENT PROCEDURE Bilateral 2021    BILATERAL MEDIAL BRANCH BLOCKS AT L405 AND L5-S1 (CPT 17133) performed by Sharon Rater, DO at [address] Right 2021    FLUOROSCOPIC-GUIDED RIGHT LUMBAR MEDIAL BRANCH NEUROLYSIS (RADIOFREQUENCY ABLATION) AT LEVELS L4-5, L5-S1 performed by Sharon Rater, DO at [address] Left 2021    FLUOROSCOPIC-GUIDED LEFT LUMBAR MEDIAL BRANCH NEUROLYSIS (RADIOFREQUENCY ABLATION) AT LEVELS L4-5, L5-S1 (CPT 58715) performed by Sharon Rater, DO at [facility]         Family History   Problem Relation Age of Onset    Heart Disease Mother     Heart Disease Father     Heart Disease Brother        Social History     Tobacco Use    Smoking status: Former Smoker     Years: 32.00     Quit date: 3/26/1987     Years since quittin.5    Smokeless tobacco: Former User   Vaping Use    Vaping Use: Never used   Substance Use Topics    Alcohol use: No     Alcohol/week: 0.0 standard drinks    Drug use: No       Functional Status: The patient is able to ambulate and perform activities of daily living without the use of an assistive device. Occupation: The patient is currently retired. ROS:   Constitutional: Denies fevers, chills, night sweats, unintentional weight loss     Skin: Denies rash or skin changes     Eyes: Denies vision changes    Ears/Nose/Throat: Denies nasal congestion or sore throat     Respiratory: Denies SOB or cough     Cardiovascular: Denies CP, palpitations, edema      Gastrointestinal: Denies abdominal pain,  N/V, constipation, or diarrhea    Genitourinary: Denies urinary symptoms    Neurologic: See HPI.     MSK: See HPI.      Psychiatric: Denies sleep disturbance, anxiety, depression    Hematologic/Lymphatic/Immunologic: Denies bruising       Physical Exam:   Blood pressure 129/73, pulse 76, height 5' 9\" (1.753 m), weight 205 lb 14.4 oz (93.4 kg). General: well developed and well nourished in no acute distress. Body habitus is normal  HEENT: No rhinorrhea, sneezing, yawning, or lacrimation. No scleral icterus or conjunctival injection. Resp: symmetrical chest expansion, unlabored breathing, respirations unlabored. CV: Heart rate is regular. Peripheral pulses are palpable  Lymph: No visible regional lymphadenopathy. Skin: No rashes or ecchymosis. Normal turgor. Psych: Mood is calm. Affect is normal.   Ext: No edema noted     MSK:   Back/Hip Exam:   Inspection: Pelvis was asymmetric. Lumbar lordotic curvature was decreased. There was scoliosis present. No ecchymoses or erythema. Palpation: Palpatory exam revealed mild tenderness along lumbosacral paraspinals, no ttp midline spine, ttp bilateral SI joint sulcus, no ttp greater trochanters and TFL on both side. There was left lumbar paraspinal spasms. There were no trigger points. ROM Decreased. +RAFY   +Gaenslen's        Neurological Exam:  Strength:                     R          L  Hip Flex                       5          5  Knee Ext                     5          5  Ankle dorsi                  5          5  EHL                             5          5  Ankle Plantar               5          5     Sensory:  Intact for light touch in all lower extremity dermatomes.       Reflexes:                     R          L  Patellar                        (2+)      (2+)  Ankle Jerk                   (1+)      (1+)     Gait is slow. Forward flexed posture      Impression:   Mac Dickson is a 81 y.o. male     1. Arthritis of sacroiliac joint    2. Lumbar spondylosis    3. Facet arthropathy    4. Impaired gait        Plan:   · Patient would benefit from bilateral SI joint injections for diagnostic/therapeutic purposes. Procedure risks, benefits and alternatives were discussed. Patient would like to proceed.    · Handicap placard

## 2021-09-30 ENCOUNTER — HOSPITAL ENCOUNTER (OUTPATIENT)
Age: 86
Setting detail: OUTPATIENT SURGERY
Discharge: HOME | End: 2021-09-30
Attending: PHYSICAL MEDICINE & REHABILITATION | Admitting: PHYSICAL MEDICINE & REHABILITATION
Payer: MEDICARE

## 2021-09-30 ENCOUNTER — HOSPITAL ENCOUNTER (OUTPATIENT)
Dept: OPERATING ROOM | Age: 86
Setting detail: OUTPATIENT SURGERY
Discharge: HOME | End: 2021-09-30
Attending: PHYSICAL MEDICINE & REHABILITATION
Payer: MEDICARE

## 2021-09-30 VITALS
BODY MASS INDEX: 30.36 KG/M2 | SYSTOLIC BLOOD PRESSURE: 112 MMHG | HEART RATE: 50 BPM | HEIGHT: 69 IN | OXYGEN SATURATION: 96 % | RESPIRATION RATE: 18 BRPM | WEIGHT: 205 LBS | DIASTOLIC BLOOD PRESSURE: 55 MMHG

## 2021-09-30 DIAGNOSIS — M46.1 SACROILIITIS (HCC): ICD-10-CM

## 2021-09-30 PROBLEM — M53.3 SACROILIAC JOINT PAIN: Status: ACTIVE | Noted: 2021-09-30

## 2021-09-30 PROCEDURE — 6360000004 HC RX CONTRAST MEDICATION: Performed by: PHYSICAL MEDICINE & REHABILITATION

## 2021-09-30 PROCEDURE — 3600000005 HC SURGERY LEVEL 5 BASE: Performed by: PHYSICAL MEDICINE & REHABILITATION

## 2021-09-30 PROCEDURE — 2500000003 HC RX 250 WO HCPCS: Performed by: PHYSICAL MEDICINE & REHABILITATION

## 2021-09-30 PROCEDURE — 7100000011 HC PHASE II RECOVERY - ADDTL 15 MIN: Performed by: PHYSICAL MEDICINE & REHABILITATION

## 2021-09-30 PROCEDURE — 7100000010 HC PHASE II RECOVERY - FIRST 15 MIN: Performed by: PHYSICAL MEDICINE & REHABILITATION

## 2021-09-30 PROCEDURE — 3209999900 FLUORO FOR SURGICAL PROCEDURES

## 2021-09-30 PROCEDURE — 6360000002 HC RX W HCPCS: Performed by: PHYSICAL MEDICINE & REHABILITATION

## 2021-09-30 PROCEDURE — 27096 INJECT SACROILIAC JOINT: CPT | Performed by: PHYSICAL MEDICINE & REHABILITATION

## 2021-09-30 PROCEDURE — 2709999900 HC NON-CHARGEABLE SUPPLY: Performed by: PHYSICAL MEDICINE & REHABILITATION

## 2021-09-30 RX ORDER — LIDOCAINE HYDROCHLORIDE 10 MG/ML
INJECTION, SOLUTION EPIDURAL; INFILTRATION; INTRACAUDAL; PERINEURAL PRN
Status: DISCONTINUED | OUTPATIENT
Start: 2021-09-30 | End: 2021-09-30 | Stop reason: ALTCHOICE

## 2021-09-30 ASSESSMENT — PAIN SCALES - GENERAL
PAINLEVEL_OUTOF10: 0
PAINLEVEL_OUTOF10: 0

## 2021-09-30 ASSESSMENT — PAIN - FUNCTIONAL ASSESSMENT: PAIN_FUNCTIONAL_ASSESSMENT: 0-10

## 2021-09-30 NOTE — OP NOTE
SACROILIAC JOINT ARTHROGRAM AND STEROID INJECTION     WITH FLUOROSCOPIC GUIDANCE    Patient: Dandre Trinh                                                    MRN: [id number]  : 1935                                             Date of procedure: 2021    Physician Performing Procedure: Chandan Gardner DO    Clinical Scenario: As per our office notes. Diagnosis: sacroiliac joint pain     Injectate: A total of 3mL, consisting of 1mL of Kenalog (40mg/mL), the remainder comprised of 2% Lidocaine. Levels Treated: bilateral Sacroiliac Joint    Approach:  Posterior     Improvement after today's procedure: As per nursing record. Comments: none    Pre-procedural evaluation: The patient was examined today just prior to performing the procedure listed above. The patient's heart rate was normal and rhythm was regular, lungs were clear to auscultation bilaterally, peripheral pulses were intact in the lower limbs, strength and sensation were preserved in the lower limbs, and reflexes were symmetric in the lower limbs. Procedure: The patient was prepped and draped in a sterile fashion in the prone position after informed consent was signed and all patient questions were answered including the risks, benefits, alternative treatment options, and prognosis. The risks include but are not limited to infection, allergic reaction, nerve damage, paralysis, epidural hematoma, syncope, headache, respiratory or cardiac arrest, and scar formation. The fluoroscopic C-arm was positioned for optimal visualization of the sacroiliac (SI) joint. The inferior portion of the above SI joint was localized under fluoroscopic visualization and 3cc of 1% Lidocaine without Epinephrine was utilized for soft tissue local anesthesia. A 22 gauge spinal needle was inserted into the fluoroscopically hyperlucent region within the SI joint.   A 0.5 cc. volume of Isovue was injected into the joint and a partial arthrogram flow pattern was obtained. Spot films were obtained. The steroid/anesthetic solution noted above was then injected into the SI joint. The patient tolerated the procedure well and was discharged after an appropriate period of observation. If there are any complications, the patient was instructed to call us. The patient is to follow-up with the requesting physician after the injection, preferably within one month.

## 2021-09-30 NOTE — H&P
Sherley Joseph, [address] [facility] Physical Medicine and Rehabilitation  [address] [facility] [address]  Phone: [phone]  Fax: [phone]     PCP: Vielka Terrazas MD  Date of visit: 9/27/21          Chief Complaint   Patient presents with    Back Pain       2 month follow up         Interval:   Patient presents today for follow up regarding low back pain. He reports a pain in the lower back, about the SI joints now. He states it feels different than the pain he had prior to lumbar RFA. He can't exactly describe it. it bothers him when he stands and walks. It does not radiate into his legs. The pain is rated Pain Score:   4. He denies numbness/tingling. He denies weakness. No bowel/bladder changes.      The prior workup has included: Xray, MRI      The prior treatment has included:  PT: completed  Chiropractic: none    Modalities: none   OTC Tylenol: yes with relief   NSAIDS: CAD. mobic currently with relief, voltaren gel with some relief   Opioids: none    Membrane stabilizers: none   Muscle relaxers: none    Previous injections: bilateral L4-5 and L5-S1 facet injections- 90% relief   Bilateral MBB L4-5 and L5-S1- 100% relief twice  Bilateral L4-5 and L5-S1 RFA  Previous surgery at this site: none        No Known Allergies            Current Outpatient Medications   Medication Sig Dispense Refill    Handicap Placard MISC by Does not apply route Reason for disability:  Arthritis of sacroiliac joint  (primary encounter diagnosis)  Lumbar spondylosis  Facet arthropathy  Impaired gait     Duration: 5 years 1 each 0    gabapentin (NEURONTIN) 100 MG capsule Take 1 capsule by mouth 2 times daily.  180 capsule 1    tamsulosin (FLOMAX) 0.4 MG capsule Take 1 capsule by mouth daily 90 capsule 1    levothyroxine (SYNTHROID) 50 MCG tablet TAKE ONE TABLET BY MOUTH DAILY 90 tablet 1    blood glucose test strips (ONE TOUCH ULTRA TEST) strip Check blood glucose qam 50 each 12  ONE TOUCH ULTRASOFT LANCETS MISC Check blood sugar qam 50 each 12    docusate sodium (COLACE) 100 MG capsule Take 100 mg by mouth daily        Lancet Device MISC Use daily for glucose monitoring 1 Device 0    Blood Glucose Monitoring Suppl (ONE TOUCH ULTRA 2) w/Device KIT Use daily for glucose monitoring 1 kit 0    ONE TOUCH LANCETS MISC Check blood glucose qam 100 each 12    atorvastatin (LIPITOR) 10 MG tablet Take 10 mg by mouth daily         metoprolol (LOPRESSOR) 25 MG tablet Take 0.5 tablets by mouth 2 times daily.  60 tablet 1    aspirin 81 MG tablet Take 81 mg by mouth daily.        multivitamin (THERAGRAN) per tablet Take 1 tablet by mouth daily.        Calcium Carbonate-Vitamin D (CALCIUM + D) 600-200 MG-UNIT TABS Take 600 mg by mouth.          No current facility-administered medications for this visit.              Past Medical History:   Diagnosis Date    Acquired hypothyroidism 9/5/2021    Bundle branch block      CAD (coronary artery disease)      Diabetes mellitus ([facility] [address]      Hyperlipidemia LDL goal < 100 8/24/2015    Spondylosis of lumbar region without myelopathy or radiculopathy 8/28/2018               Past Surgical History:   Procedure Laterality Date    ANESTHESIA NERVE BLOCK Bilateral 2/28/2019     BILATERAL L4-5 L5-S1 INTRA-ARTICULAR FACET STEROID INJECTION (CPT 77377) performed by Sherley Joseph DO at [address] [place] [address]   3/27/13     off-pump cabg x 3- Dr. [facility] CATH LAB PROCEDURE   3/26/13    FRACTURE SURGERY        NERVE BLOCK Bilateral 02/28/2019     lumbar intra-articular facet    NERVE BLOCK Bilateral 4/29/2021     BILATERAL MEDIAL BRANCH BLOCK AT L4-5 AND L5-S1 (CPT 88004) performed by Sherley Joseph DO at [facility] Bilateral 05/20/2021     B/L medial branch blocks at L4-5, L5-S1    OTHER SURGICAL HISTORY N/A 04/29/2021     BILATERAL MEDIAL BRANCH BLOCK L4-5 AND L5-S1     depression    Hematologic/Lymphatic/Immunologic: Denies bruising         Physical Exam:   Blood pressure 129/73, pulse 76, height 5' 9\" (1.753 m), weight 205 lb 14.4 oz (93.4 kg). General: well developed and well nourished in no acute distress. Body habitus is normal  HEENT: No rhinorrhea, sneezing, yawning, or lacrimation. No scleral icterus or conjunctival injection. Resp: symmetrical chest expansion, unlabored breathing, respirations unlabored. CV: Heart rate is regular. Peripheral pulses are palpable  Lymph: No visible regional lymphadenopathy. Skin: No rashes or ecchymosis. Normal turgor. Psych: Mood is calm. Affect is normal.   Ext: No edema noted      MSK:   Back/Hip Exam:   Inspection: Pelvis was asymmetric. Lumbar lordotic curvature was decreased. There was scoliosis present.  No ecchymoses or erythema. Palpation: Palpatory exam revealed mild tenderness along lumbosacral paraspinals, no ttp midline spine, ttp bilateral SI joint sulcus, no ttp greater trochanters and TFL on both side. There was left lumbar paraspinal spasms. There were no trigger points.    ROM Decreased. +RAFY   +Gaenslen's         Neurological Exam:  OSTJHJIV:                     E          L  Hip Flex                       5          6  Knee Ext                     5          7  Ankle dorsi                  5          7  EHL                             5          5  Ankle Plantar               5          5     Sensory:  Intact for light touch in all lower extremity dermatomes.       Reflexes:                     R          L  Patellar                        (2+)      (2+)  Ankle Jerk                   (1+)      (1+)     Gait is slow. Forward flexed posture        Impression:   Som Urena is a 81 y.o. male      1. Arthritis of sacroiliac joint    2. Lumbar spondylosis    3. Facet arthropathy    4. Impaired gait          Plan:   · Patient would benefit from bilateral SI joint injections for diagnostic/therapeutic purposes. Procedure risks, benefits and alternatives were discussed. Patient would like to proceed. · Handicap placard provided   · Continue HEP. Discussed proper posture techniques      · The patient was educated about the diagnosis, prognosis, indications, risks and benefits of treatment. An opportunity to ask questions was given to the patient and questions were answered.   The patient agreed to proceed with the recommended treatment as described above.      Follow up after injection      Trace Alonzo DO, [facility]   Board Certified Physical Medicine and Rehabilitation

## 2021-10-18 ENCOUNTER — OFFICE VISIT (OUTPATIENT)
Dept: PHYSICAL MEDICINE AND REHAB | Age: 86
End: 2021-10-18
Payer: MEDICARE

## 2021-10-18 VITALS
HEART RATE: 74 BPM | HEIGHT: 69 IN | BODY MASS INDEX: 30.27 KG/M2 | SYSTOLIC BLOOD PRESSURE: 134 MMHG | WEIGHT: 204.4 LBS | DIASTOLIC BLOOD PRESSURE: 73 MMHG

## 2021-10-18 DIAGNOSIS — M47.819 FACET ARTHROPATHY: ICD-10-CM

## 2021-10-18 DIAGNOSIS — M47.816 LUMBAR SPONDYLOSIS: ICD-10-CM

## 2021-10-18 DIAGNOSIS — M47.818 ARTHRITIS OF SACROILIAC JOINT: Primary | ICD-10-CM

## 2021-10-18 DIAGNOSIS — R26.9 IMPAIRED GAIT: ICD-10-CM

## 2021-10-18 PROCEDURE — 99212 OFFICE O/P EST SF 10 MIN: CPT | Performed by: PHYSICAL MEDICINE & REHABILITATION

## 2021-10-18 NOTE — PROGRESS NOTES
Esvin Alston, [address] [facility] Physical Medicine and Rehabilitation  [address] [facility] [address]  Phone: [phone]  Fax: [phone]    PCP: Eugenia Nguyen MD  Date of visit: 10/18/21    Chief Complaint   Patient presents with    Back Pain     FOLLOW UP AFTER INJECTION       Interval:   Patient presents today for follow up after bilateral SI joint injections. He reports 99% relief in his pain. He has no complaints today. He uses a cane with long distances. The pain is rated Pain Score:   1. He denies numbness/tingling. He denies weakness. No bowel/bladder changes. The prior workup has included: Xray, MRI     The prior treatment has included:  PT: completed  Chiropractic: none    Modalities: none   OTC Tylenol: yes with relief   NSAIDS: CAD. mobic currently with relief, voltaren gel with some relief   Opioids: none    Membrane stabilizers: none   Muscle relaxers: none    Previous injections: bilateral L4-5 and L5-S1 facet injections- 90% relief   Bilateral MBB L4-5 and L5-S1- 100% relief twice  Bilateral L4-5 and L5-S1 RFA  Bilateral SI joint  Previous surgery at this site: none      No Known Allergies    Current Outpatient Medications   Medication Sig Dispense Refill    Handicap Placard MISC by Does not apply route Reason for disability:  Arthritis of sacroiliac joint  (primary encounter diagnosis)  Lumbar spondylosis  Facet arthropathy  Impaired gait    Duration: 5 years 1 each 0    gabapentin (NEURONTIN) 100 MG capsule Take 1 capsule by mouth 2 times daily.  180 capsule 1    tamsulosin (FLOMAX) 0.4 MG capsule Take 1 capsule by mouth daily 90 capsule 1    levothyroxine (SYNTHROID) 50 MCG tablet TAKE ONE TABLET BY MOUTH DAILY 90 tablet 1    blood glucose test strips (ONE TOUCH ULTRA TEST) strip Check blood glucose qam 50 each 12    ONE TOUCH ULTRASOFT LANCETS MISC Check blood sugar qam 50 each 12    docusate sodium (COLACE) 100 MG capsule Take 100 mg by mouth daily      Lancet Device MISC Use daily for glucose monitoring 1 Device 0    Blood Glucose Monitoring Suppl (ONE TOUCH ULTRA 2) w/Device KIT Use daily for glucose monitoring 1 kit 0    ONE TOUCH LANCETS MISC Check blood glucose qam 100 each 12    atorvastatin (LIPITOR) 10 MG tablet Take 10 mg by mouth daily       metoprolol (LOPRESSOR) 25 MG tablet Take 0.5 tablets by mouth 2 times daily. (Patient taking differently: Take 25 mg by mouth daily ) 60 tablet 1    aspirin 81 MG tablet Take 81 mg by mouth daily.  multivitamin (THERAGRAN) per tablet Take 1 tablet by mouth daily.  Calcium Carbonate-Vitamin D (CALCIUM + D) 600-200 MG-UNIT TABS Take 600 mg by mouth. No current facility-administered medications for this visit.        Past Medical History:   Diagnosis Date    Acquired hypothyroidism 9/5/2021    Bundle branch block     CAD (coronary artery disease)     Diabetes mellitus ([facility] [address]     Hyperlipidemia LDL goal < 100 8/24/2015    Spondylosis of lumbar region without myelopathy or radiculopathy 8/28/2018       Past Surgical History:   Procedure Laterality Date    ANESTHESIA NERVE BLOCK Bilateral 2/28/2019    BILATERAL L4-5 L5-S1 INTRA-ARTICULAR FACET STEROID INJECTION (CPT 84622) performed by Anibal Lovell DO at [address] Bilateral 9/30/2021    BILATERAL SACROILIAC JOINT INJECTION UNDER X-RAY GUIDANCE performed by Anibal Lovell DO at [place] [address] [address]  3/27/13    off-pump cabg x 3- Dr. Kristina Alicea CATH LAB PROCEDURE  3/26/13    FRACTURE SURGERY      NERVE BLOCK Bilateral 02/28/2019    lumbar intra-articular facet    NERVE BLOCK Bilateral 4/29/2021    BILATERAL MEDIAL BRANCH BLOCK AT L4-5 AND L5-S1 (CPT 91044) performed by Anibal Lovell DO at [facility] Bilateral 05/20/2021    B/L medial branch blocks at L4-5, L5-S1    OTHER SURGICAL HISTORY N/A 04/29/2021 BILATERAL MEDIAL BRANCH BLOCK L4-5 AND L5-S1     OTHER SURGICAL HISTORY Right 2021    L4-5, L5-S-1 : Medial branch neurolysisi radiofrequency ablation    PAIN MANAGEMENT PROCEDURE Bilateral 2021    BILATERAL MEDIAL BRANCH BLOCKS AT L405 AND L5-S1 (CPT 95486) performed by DO Lainey at [address] Right 2021    FLUOROSCOPIC-GUIDED RIGHT LUMBAR MEDIAL BRANCH NEUROLYSIS (RADIOFREQUENCY ABLATION) AT LEVELS L4-5, L5-S1 performed by DO Lainey at [address] Left 2021    FLUOROSCOPIC-GUIDED LEFT LUMBAR MEDIAL BRANCH NEUROLYSIS (RADIOFREQUENCY ABLATION) AT LEVELS L4-5, L5-S1 (CPT 26397) performed by DO Lainey at [facility]         Family History   Problem Relation Age of Onset    Heart Disease Mother     Heart Disease Father     Heart Disease Brother        Social History     Tobacco Use    Smoking status: Former Smoker     Years: 32.00     Quit date: 3/26/1987     Years since quittin.5    Smokeless tobacco: Former User   Vaping Use    Vaping Use: Never used   Substance Use Topics    Alcohol use: No     Alcohol/week: 0.0 standard drinks    Drug use: No       Functional Status: The patient is able to ambulate and perform activities of daily living without the use of an assistive device. Occupation: The patient is currently retired. ROS:   Constitutional: Denies fevers, chills, night sweats, unintentional weight loss     Skin: Denies rash or skin changes     Eyes: Denies vision changes    Ears/Nose/Throat: Denies nasal congestion or sore throat     Respiratory: Denies SOB or cough     Cardiovascular: Denies CP, palpitations, edema      Gastrointestinal: Denies abdominal pain,  N/V, constipation, or diarrhea    Genitourinary: Denies urinary symptoms    Neurologic: See HPI.     MSK: See HPI.      Psychiatric: Denies sleep disturbance, anxiety, depression    Hematologic/Lymphatic/Immunologic: Denies bruising       Physical Exam:   Blood pressure 134/73, pulse 74, height 5' 9\" (1.753 m), weight 204 lb 6.4 oz (92.7 kg). General: well developed and well nourished in no acute distress. Body habitus is normal  HEENT: No rhinorrhea, sneezing, yawning, or lacrimation. No scleral icterus or conjunctival injection. Resp: symmetrical chest expansion, unlabored breathing, respirations unlabored. CV: Heart rate is regular. Peripheral pulses are palpable  Lymph: No visible regional lymphadenopathy. Skin: No rashes or ecchymosis. Normal turgor. Psych: Mood is calm. Affect is normal.   Ext: No edema noted     MSK:   Back/Hip Exam:   Inspection: Pelvis was asymmetric. Lumbar lordotic curvature was decreased. There was scoliosis present. No ecchymoses or erythema.      Neurological Exam:  Gait is slow. Forward flexed posture      Impression:   Porter Butts is a 81 y.o. male     1. Arthritis of sacroiliac joint    2. Impaired gait    3. Lumbar spondylosis    4. Facet arthropathy        Plan:   · 99% relief with SI joint injections. Discussed repeat in 3 months if needed vs RFA. · Encouraged to use cane when needed. · Continue HEP     The patient was educated about the diagnosis, prognosis, indications, risks and benefits of treatment. An opportunity to ask questions was given to the patient and questions were answered. The patient agreed to proceed with the recommended treatment as described above.      Follow up TESSA Retana DO [facility]   Board Certified Physical Medicine and Rehabilitation

## 2021-11-18 ENCOUNTER — IMMUNIZATION (OUTPATIENT)
Dept: PRIMARY CARE CLINIC | Age: 86
End: 2021-11-18
Payer: MEDICARE

## 2021-11-18 PROCEDURE — 91306 COVID-19, MODERNA BOOSTER VACCINE 0.25ML DOSE: CPT | Performed by: NURSE PRACTITIONER

## 2021-11-18 PROCEDURE — 0064A COVID-19, MODERNA BOOSTER VACCINE 0.25ML DOSE: CPT | Performed by: NURSE PRACTITIONER

## 2022-03-02 ENCOUNTER — OFFICE VISIT (OUTPATIENT)
Dept: FAMILY MEDICINE CLINIC | Age: 87
End: 2022-03-02
Payer: MEDICARE

## 2022-03-02 VITALS
HEART RATE: 68 BPM | HEIGHT: 69 IN | SYSTOLIC BLOOD PRESSURE: 124 MMHG | OXYGEN SATURATION: 99 % | DIASTOLIC BLOOD PRESSURE: 72 MMHG | RESPIRATION RATE: 20 BRPM | BODY MASS INDEX: 31.25 KG/M2 | WEIGHT: 211 LBS

## 2022-03-02 DIAGNOSIS — Z12.5 ENCOUNTER FOR SCREENING FOR MALIGNANT NEOPLASM OF PROSTATE: ICD-10-CM

## 2022-03-02 DIAGNOSIS — E11.9 TYPE 2 DIABETES MELLITUS WITHOUT COMPLICATION, WITHOUT LONG-TERM CURRENT USE OF INSULIN (HCC): ICD-10-CM

## 2022-03-02 DIAGNOSIS — Z00.00 MEDICARE ANNUAL WELLNESS VISIT, SUBSEQUENT: Primary | ICD-10-CM

## 2022-03-02 DIAGNOSIS — N13.8 BPH WITH OBSTRUCTION/LOWER URINARY TRACT SYMPTOMS: ICD-10-CM

## 2022-03-02 DIAGNOSIS — N40.1 BPH WITH OBSTRUCTION/LOWER URINARY TRACT SYMPTOMS: ICD-10-CM

## 2022-03-02 DIAGNOSIS — E03.9 ACQUIRED HYPOTHYROIDISM: ICD-10-CM

## 2022-03-02 DIAGNOSIS — M47.816 SPONDYLOSIS OF LUMBAR REGION WITHOUT MYELOPATHY OR RADICULOPATHY: ICD-10-CM

## 2022-03-02 LAB — HBA1C MFR BLD: 6 %

## 2022-03-02 PROCEDURE — G0439 PPPS, SUBSEQ VISIT: HCPCS | Performed by: FAMILY MEDICINE

## 2022-03-02 PROCEDURE — 83036 HEMOGLOBIN GLYCOSYLATED A1C: CPT | Performed by: FAMILY MEDICINE

## 2022-03-02 RX ORDER — GABAPENTIN 100 MG/1
100 CAPSULE ORAL 2 TIMES DAILY
Qty: 180 CAPSULE | Refills: 1 | Status: SHIPPED
Start: 2022-03-02 | End: 2022-08-22 | Stop reason: SDUPTHER

## 2022-03-02 RX ORDER — LEVOTHYROXINE SODIUM 0.05 MG/1
TABLET ORAL
Qty: 90 TABLET | Refills: 1 | Status: SHIPPED
Start: 2022-03-02 | End: 2022-08-22 | Stop reason: SDUPTHER

## 2022-03-02 RX ORDER — TAMSULOSIN HYDROCHLORIDE 0.4 MG/1
0.4 CAPSULE ORAL DAILY
Qty: 90 CAPSULE | Refills: 1 | Status: SHIPPED
Start: 2022-03-02 | End: 2022-08-22 | Stop reason: SDUPTHER

## 2022-03-02 ASSESSMENT — PATIENT HEALTH QUESTIONNAIRE - PHQ9
SUM OF ALL RESPONSES TO PHQ QUESTIONS 1-9: 0
SUM OF ALL RESPONSES TO PHQ QUESTIONS 1-9: 0
2. FEELING DOWN, DEPRESSED OR HOPELESS: 0
SUM OF ALL RESPONSES TO PHQ9 QUESTIONS 1 & 2: 0
SUM OF ALL RESPONSES TO PHQ QUESTIONS 1-9: 0
SUM OF ALL RESPONSES TO PHQ QUESTIONS 1-9: 0
1. LITTLE INTEREST OR PLEASURE IN DOING THINGS: 0

## 2022-03-02 ASSESSMENT — LIFESTYLE VARIABLES: HOW OFTEN DO YOU HAVE A DRINK CONTAINING ALCOHOL: NEVER

## 2022-03-02 NOTE — PROGRESS NOTES
Medicare Annual Wellness Visit    Makenna Cervantes is here for Medicare AWV (has cold)    Assessment & Plan   Derrick Montenegro was seen today for medicare awv. Diagnoses and all orders for this visit:    Medicare annual wellness visit, subsequent    Type 2 diabetes mellitus without complication, without long-term current use of insulin ([facility] [place] [address]  -     POCT glycosylated hemoglobin (Hb A1C)  -     CBC; Future  -     Comprehensive Metabolic Panel; Future  -     Lipid Panel; Future  -     Microalbumin / Creatinine Urine Ratio; Future    BPH with obstruction/lower urinary tract symptoms  -     tamsulosin (FLOMAX) 0.4 MG capsule; Take 1 capsule by mouth daily  -     PSA Screening; Future    Spondylosis of lumbar region without myelopathy or radiculopathy  -     gabapentin (NEURONTIN) 100 MG capsule; Take 1 capsule by mouth 2 times daily. Acquired hypothyroidism  -     levothyroxine (SYNTHROID) 50 MCG tablet; TAKE ONE TABLET BY MOUTH DAILY  -     TSH; Future    Encounter for screening for malignant neoplasm of prostate   -     PSA Screening; Future         Recommendations for Preventive Services Due: see orders and patient instructions/AVS.  Recommended screening schedule for the next 5-10 years is provided to the patient in written form: see Patient Instructions/AVS.     Return in 6 months (on 9/2/2022) for diabetes. Subjective   Patient is here to follow up on diabetes. Fasting blood sugars: Midday blood sugars: not checking. Patient checks blood glucose 1 times per day. Patient is following diabetic diet. Patient is a nonsmoker. Last ophthalmology visit: 6/2021. Patient is taking a daily statin. Last urine microalbumin: 9/2021    Patient has had sore throat and cough for past 5 days. Denies fever/chills. Taking otc cold medication and cough syrup. Cough is productive of white sputum. Feeling better already.   Denies sick contacts        Patient's complete Health Risk Assessment and screening values have been reviewed and are found in [facility]. The following problems were reviewed today and where indicated follow up appointments were made and/or referrals ordered. Positive Risk Factor Screenings with Interventions:               General Health and ACP:  General  In general, how would you say your health is?: Good  In the past 7 days, have you experienced any of the following: New or Increased Pain, New or Increased Fatigue, Loneliness, Social Isolation, Stress or Anger?: (!) Yes  Select all that apply: (!) New or Increased Fatigue  Do you get the social and emotional support that you need?: Yes  Do you have a Living Will?: Yes    Advance Directives     Power of  Living Will ACP-Advance Directive ACP-Power of     Not on File Filed on 03/31/13 Filed Not on File      General Health Risk Interventions:  · recently due to URI    Health Habits/Nutrition:     Physical Activity: Insufficiently Active    Days of Exercise per Week: 4 days    Minutes of Exercise per Session: 30 min     Have you lost any weight without trying in the past 3 months?: No  Body mass index: (!) 31.16  Have you seen the dentist within the past year?: (!) No    Health Habits/Nutrition Interventions:  · Nutritional issues:  educational materials for healthy, well-balanced diet provided    Hearing/Vision:  Do you or your family notice any trouble with your hearing that hasn't been managed with hearing aids?: (!) Yes  Do you have difficulty driving, watching TV, or doing any of your daily activities because of your eyesight?: No  Have you had an eye exam within the past year?: Yes  No exam data present    Hearing/Vision Interventions:  · Hearing concerns:  uses hearing aids            Objective   Vitals:    03/02/22 0858   BP: 124/72   Pulse: 68   Resp: 20   SpO2: 99%   Weight: 211 lb (95.7 kg)   Height: 5' 9\" (1.753 m)      Body mass index is 31.16 kg/m².                No Known Allergies  Prior to Visit Medications    Medication Sig Taking? Authorizing Provider   tamsulosin (FLOMAX) 0.4 MG capsule Take 1 capsule by mouth daily Yes Darin Quigley MD   gabapentin (NEURONTIN) 100 MG capsule Take 1 capsule by mouth 2 times daily. Yes Darin Quigley MD   levothyroxine (SYNTHROID) 50 MCG tablet TAKE ONE TABLET BY MOUTH DAILY Yes Darin Quigley MD   Handicap Placard MISC by Does not apply route Reason for disability:  Arthritis of sacroiliac joint  (primary encounter diagnosis)  Lumbar spondylosis  Facet arthropathy  Impaired gait    Duration: 5 years Yes Sandy Duran, DO   blood glucose test strips (ONE TOUCH ULTRA TEST) strip Check blood glucose qam Yes Darin Quigley MD   ONE TOUCH ULTRASOFT LANCETS MISC Check blood sugar qam Yes Darin Quigley MD   docusate sodium (COLACE) 100 MG capsule Take 100 mg by mouth daily Yes Historical Provider, MD   Lancet Device MISC Use daily for glucose monitoring Yes Darin Quigley MD   Blood Glucose Monitoring Suppl (ONE TOUCH ULTRA 2) w/Device KIT Use daily for glucose monitoring Yes Darin Quigley MD   ONE TOUCH LANCETS MISC Check blood glucose qam Yes Darin Quigley MD   atorvastatin (LIPITOR) 10 MG tablet Take 10 mg by mouth daily  Yes Historical Provider, MD   metoprolol (LOPRESSOR) 25 MG tablet Take 0.5 tablets by mouth 2 times daily. Patient taking differently: Take 25 mg by mouth daily  Yes Damita Habermann, MD   aspirin 81 MG tablet Take 81 mg by mouth daily. Yes Historical Provider, MD   multivitamin [facility]) per tablet Take 1 tablet by mouth daily. Yes Historical Provider, MD   Calcium Carbonate-Vitamin D (CALCIUM + D) 600-200 MG-UNIT TABS Take 600 mg by mouth.  Yes Historical Provider, MD [place] (Including outside providers/suppliers regularly involved in providing care):   Patient Care Team:  Darin Quigley MD as PCP - General (Family Medicine)  Darin Quigley MD as PCP - [facility] Empaneled Provider  Jimmy Kevin MD as Consulting Physician (Cardiothoracic Surgery)  Nj Mac MD as Consulting Physician (Cardiology)    Reviewed and updated this visit:  Tobacco  Allergies  Meds  Problems  Med Hx  Surg Hx  Soc Hx  Fam Hx

## 2022-03-02 NOTE — PATIENT INSTRUCTIONS
Personalized Preventive Plan for Lowden Lente - 3/2/2022  Medicare offers a range of preventive health benefits. Some of the tests and screenings are paid in full while other may be subject to a deductible, co-insurance, and/or copay. Some of these benefits include a comprehensive review of your medical history including lifestyle, illnesses that may run in your family, and various assessments and screenings as appropriate. After reviewing your medical record and screening and assessments performed today your provider may have ordered immunizations, labs, imaging, and/or referrals for you. A list of these orders (if applicable) as well as your Preventive Care list are included within your After Visit Summary for your review. Other Preventive Recommendations:    · A preventive eye exam performed by an eye specialist is recommended every 1-2 years to screen for glaucoma; cataracts, macular degeneration, and other eye disorders. · A preventive dental visit is recommended every 6 months. · Try to get at least 150 minutes of exercise per week or 10,000 steps per day on a pedometer . · Order or download the FREE \"Exercise & Physical Activity: Your Everyday Guide\" from [employer]. Call 2-664.582.8821 or search [employer] online. · You need 1882-3256 mg of calcium and 5881-7895 IU of vitamin D per day. It is possible to meet your calcium requirement with diet alone, but a vitamin D supplement is usually necessary to meet this goal.  · When exposed to the sun, use a sunscreen that protects against both UVA and UVB radiation with an SPF of 30 or greater. Reapply every 2 to 3 hours or after sweating, drying off with a towel, or swimming. · Always wear a seat belt when traveling in a car. Always wear a helmet when riding a bicycle or motorcycle. Heart-Healthy Diet   Sodium, Fat, and Cholesterol Controlled Diet       What Is a Heart Healthy Diet?    A heart-healthy diet is one that limits sodium , certain types of fat , and cholesterol . This type of diet is recommended for:   People with any form of cardiovascular disease (eg, coronary heart disease , peripheral vascular disease , previous heart attack , previous stroke )   People with risk factors for cardiovascular disease, such as high blood pressure , high cholesterol , or diabetes   Anyone who wants to lower their risk of developing cardiovascular disease   Sodium    Sodium is a mineral found in many foods. In general, most people consume much more sodium than they need. Diets high in sodium can increase blood pressure and lead to edema (water retention). On a heart-healthy diet, you should consume no more than 2,300 mg (milligrams) of sodium per dayabout the amount in one teaspoon of table salt. The foods highest in sodium include table salt (about 50% sodium), processed foods, convenience foods, and preserved foods. Cholesterol    Cholesterol is a fat-like, waxy substance in your blood. Our bodies make some cholesterol. It is also found in animal products, with the highest amounts in fatty meat, egg yolks, whole milk, cheese, shellfish, and organ meats. On a heart-healthy diet, you should limit your cholesterol intake to less than 200 mg per day. It is normal and important to have some cholesterol in your bloodstream. But too much cholesterol can cause plaque to build up within your arteries, which can eventually lead to a heart attack or stroke. The two types of cholesterol that are most commonly referred to are:   Low-density lipoprotein (LDL) cholesterol  Also known as bad cholesterol, this is the cholesterol that tends to build up along your arteries. Bad cholesterol levels are increased by eating fats that are saturated or hydrogenated. Optimal level of this cholesterol is less than 100. Over 130 starts to get risky for heart disease.    High-density lipoprotein (HDL) cholesterol  Also known as good cholesterol, this type of cholesterol actually carries cholesterol away from your arteries and may, therefore, help lower your risk of having a heart attack. You want this level to be high (ideally greater than 60). It is a risk to have a level less than 40. You can raise this good cholesterol by eating olive oil, canola oil, avocados, or nuts. Exercise raises this level, too. Fat    Fat is calorie dense and packs a lot of calories into a small amount of food. Even though fats should be limited due to their high calorie content, not all fats are bad. In fact, some fats are quite healthful. Fat can be broken down into four main types. The good-for-you fats are:   Monounsaturated fat  found in oils such as olive and canola, avocados, and nuts and natural nut butters; can decrease cholesterol levels, while keeping levels of HDL cholesterol high   Polyunsaturated fat  found in oils such as safflower, sunflower, soybean, corn, and sesame; can decrease total cholesterol and LDL cholesterol   Omega-3 fatty acids  particularly those found in fatty fish (such as salmon, trout, tuna, mackerel, herring, and sardines); can decrease risk of arrhythmias, decrease triglyceride levels, and slightly lower blood pressure   The fats that you want to limit are:   Saturated fat  found in animal products, many fast foods, and a few vegetables; increases total blood cholesterol, including LDL levels   Animal fats that are saturated include: butter, lard, whole-milk dairy products, meat fat, and poultry skin   Vegetable fats that are saturated include: hydrogenated shortening, palm oil, coconut oil, cocoa butter   Hydrogenated or trans fat  found in margarine and vegetable shortening, most shelf stable snack foods, and fried foods; increases LDL and decreases HDL     It is generally recommended that you limit your total fat for the day to less than 30% of your total calories.  If you follow an 1800-calorie heart healthy diet, for example, this would mean 60 grams of fat or less per day. Saturated fat and trans fat in your diet raises your blood cholesterol the most, much more than dietary cholesterol does. For this reason, on a heart-healthy diet, less than 7% of your calories should come from saturated fat and ideally 0% from trans fat. On an 1800-calorie diet, this translates into less than 14 grams of saturated fat per day, leaving 46 grams of fat to come from mono- and polyunsaturated fats.    Food Choices on a Heart Healthy Diet   Food Category   Foods Recommended   Foods to Avoid   Grains   Breads and rolls without salted tops Most dry and cooked cereals Unsalted crackers and breadsticks Low-sodium or homemade breadcrumbs or stuffing All rice and pastas   Breads, rolls, and crackers with salted tops High-fat baked goods (eg, muffins, donuts, pastries) Quick breads, self-rising flour, and biscuit mixes Regular bread crumbs Instant hot cereals Commercially prepared rice, pasta, or stuffing mixes   Vegetables   Most fresh, frozen, and low-sodium canned vegetables Low-sodium and salt-free vegetable juices Canned vegetables if unsalted or rinsed   Regular canned vegetables and juices, including sauerkraut and pickled vegetables Frozen vegetables with sauces Commercially prepared potato and vegetable mixes   Fruits   Most fresh, frozen, and canned fruits All fruit juices   Fruits processed with salt or sodium   Milk   Nonfat or low-fat (1%) milk Nonfat or low-fat yogurt Cottage cheese, low-fat ricotta, cheeses labeled as low-fat and low-sodium   Whole milk Reduced-fat (2%) milk Malted and chocolate milk Full fat yogurt Most cheeses (unless low-fat and low salt) Buttermilk (no more than 1 cup per week)   Meats and Beans   Lean cuts of fresh or frozen beef, veal, lamb, or pork (look for the word loin) Fresh or frozen poultry without the skin Fresh or frozen fish and some shellfish Egg whites and egg substitutes (Limit whole eggs to three per week) Tofu Nuts or seeds (unsalted, dry-roasted), low-sodium peanut butter Dried peas, beans, and lentils   Any smoked, cured, salted, or canned meat, fish, or poultry (including inman, chipped beef, cold cuts, hot dogs, sausages, sardines, and anchovies) Poultry skins Breaded and/or fried fish or meats Canned peas, beans, and lentils Salted nuts   Fats and Oils   Olive oil and canola oil Low-sodium, low-fat salad dressings and mayonnaise   Butter, margarine, coconut and palm oils, inman fat   Snacks, Sweets, and Condiments   Low-sodium or unsalted versions of broths, soups, soy sauce, and condiments Pepper, herbs, and spices; vinegar, lemon, or lime juice Low-fat frozen desserts (yogurt, sherbet, fruit bars) Sugar, cocoa powder, honey, syrup, jam, and preserves Low-fat, trans-fat free cookies, cakes, and pies Andres and animal crackers, fig bars, yesica snaps   High-fat desserts Broth, soups, gravies, and sauces, made from instant mixes or other high-sodium ingredients Salted snack foods Canned olives Meat tenderizers, seasoning salt, and most flavored vinegars   Beverages   Low-sodium carbonated beverages Tea and coffee in moderation Soy milk   Commercially softened water   Suggestions   Make whole grains, fruits, and vegetables the base of your diet. Choose heart-healthy fats such as canola, olive, and flaxseed oil, and foods high in heart-healthy fats, such as nuts, seeds, soybeans, tofu, and fish. Eat fish at least twice per week; the fish highest in omega-3 fatty acids and lowest in mercury include salmon, herring, mackerel, sardines, and canned chunk light tuna. If you eat fish less than twice per week or have high triglycerides, talk to your doctor about taking fish oil supplements. Read food labels.    For products low in fat and cholesterol, look for fat free, low-fat, cholesterol free, saturated fat free, and trans fat freeAlso scan the Nutrition Facts Label, which lists saturated fat, trans fat, and cholesterol amounts. For products low in sodium, look for sodium free, very low sodium, low sodium, no added salt, and unsalted   Skip the salt when cooking or at the table; if food needs more flavor, get creative and try out different herbs and spices. Garlic and onion also add substantial flavor to foods. Trim any visible fat off meat and poultry before cooking, and drain the fat off after evangelista. Use cooking methods that require little or no added fat, such as grilling, boiling, baking, poaching, broiling, roasting, steaming, stir-frying, and sauting. Avoid fast food and convenience food. They tend to be high in saturated and trans fat and have a lot of added salt. Talk to a registered dietitian for individualized diet advice. Last Reviewed: March 2011 Vicki Godinez MS, MPH, RD   Updated: 3/29/2011   ·     Keep Your Memory [facility]       Many factors can affect your ability to remembera hectic lifestyle, aging, stress, chronic disease, and certain medicines. But, there are steps you can take to sharpen your mind and help preserve your memory. Challenge Your Brain   Regularly challenging your mind may help keeps it in top shape. Good mental exercises include:   Crossword puzzlesUse a dictionary if you need it; you will learn more that way. Brainteasers Try some! Crafts, such as wood working and sewing   Hobbies, such as gardening and building model airplanes   SocializingVisit old friends or join groups to meet new ones. Reading   Learning a new language   Taking a class, whether it be art history or liliya chi   TravelingExperience the food, history, and culture of your destination   Learning to use a computer   Going to museums, the theater, or thought-provoking movies   Changing things in your daily life, such as reversing your pattern in the grocery store or brushing your teeth using your nondominant hand   Use Memory Aids   There is no need to remember every detail on your own.  These memory aids can help:   Calendars and day planners   Electronic organizers to store all sorts of helpful informationThese devices can \"beep\" to remind you of appointments. A book of days to record birthdays, anniversaries, and other occasions that occur on the same date every year   Detailed \"to-do\" lists and strategically placed sticky notes   Quick \"study\" sessionsBefore a gathering, review who will be there so their names will be fresh in your mind. Establish routinesFor example, keep your keys, wallet, and umbrella in the same place all the time or take medicine with your 8:00 AM glass of juice   Live a Healthy Life   Many actions that will keep your body strong will do the same for your mind. For example:   Talk to Your Doctor About Herbs and Supplements    Malnutrition and vitamin deficiencies can impair your mental function. For example, vitamin B12 deficiency can cause a range of symptoms, including confusion. But, what if your nutritional needs are being met? Can herbs and supplements still offer a benefit? Researchers have investigated a range of natural remedies, such as ginkgo , ginseng , and the supplement phosphatidylserine (PS). So far, though, the evidence is inconsistent as to whether these products can improve memory or thinking. If you are interested in taking herbs and supplements, talk to your doctor first because they may interact with other medicines that you are taking. Exercise Regularly    Among the many benefits of regular exercise are increased blood flow to the brain and decreased risk of certain diseases that can interfere with memory function. One study found that even moderate exercise has a beneficial effect. Examples of \"moderate\" exercise include:   Playing 18 holes of golf once a week, without a cart   Playing tennis twice a week   Walking one mile per day   Manage Stress    It can be tough to remember what is important when your mind is cluttered.  Make time for relaxation. Choose activities that calm you down, and make it routine. Manage Chronic Conditions    Side effects of high blood pressure , diabetes, and heart disease can interfere with mental function. Many of the lifestyle steps discussed here can help manage these conditions. Strive to eat a healthy diet, exercise regularly, get stress under control, and follow your doctor's advice for your condition. Minimize Medications    Talk to your doctor about the medicines that you take. Some may be unnecessary. Also, healthy lifestyle habits may lower the need for certain drugs. Last Reviewed: April 2010 Dimple Shaffer MD   Updated: 4/13/2010   ·     [address]       As we get older, changes in balance, gait, strength, vision, hearing, and cognition make even the most youthful senior more prone to accidents. Falls are one of the leading health risks for older people. This increased risk of falling is related to:   Aging process (eg, decreased muscle strength, slowed reflexes)   Higher incidence of chronic health problems (eg, arthritis, diabetes) that may limit mobility, agility or sensory awareness   Side effects of medicine (eg, dizziness, blurred vision)especially medicines like prescription pain medicines and drugs used to treat mental health conditions   Depending on the brittleness of your bones, the consequences of a fall can be serious and long lasting. Home Life   Research by the Association of Aging [facility]) shows that some home accidents among older adults can be prevented by making simple lifestyle changes and basic modifications and repairs to the home environment. Here are some lifestyle changes that experts recommend:   Have your hearing and vision checked regularly. Be sure to wear prescription glasses that are right for you. Speak to your doctor or pharmacist about the possible side effects of your medicines. A number of medicines can cause dizziness.    If you have problems with sleep, talk to your doctor. Limit your intake of alcohol. If necessary, use a cane or walker to help maintain your balance. Wear supportive, rubber-soled shoes, even at home. If you live in a region that gets wintry weather, you may want to put special cleats on your shoes to prevent you from slipping on the snow and ice. Exercise regularly to help maintain muscle tone, agility, and balance. Always hold the banister when going up or down stairs. Also, use  bars when getting in or out of the bath or shower, or using the toilet. To avoid dizziness, get up slowly from a lying down position. Sit up first, dangling your legs for a minute or two before rising to a standing position. Overall Home Safety Check   According to the Consumer [employer] Safety [employer]'s \"Older Consumer Home Safety Checklist,\" it is important to check for potential hazards in each room. And remember, proper lighting is an essential factor in home safety. If you cannot see clearly, you are more likely to fall. Important questions to ask yourself include:   Are lamp, electric, extension, and telephone cords placed out of the flow of traffic and maintained in good condition? Have frayed cords been replaced? Are all small rugs and runners slip resistant? If not, you can secure them to the floor with a special double-sided carpet tape. Are smoke detectors properly locatedone on every floor of your home and one outside of every sleeping area? Are they in good working order? Are batteries replaced at least once a year? Do you have a well-maintained carbon monoxide detector outside every sleeping are in your home? Does your furniture layout leave plenty of space to maneuver between and around chairs, tables, beds, and sofas? Are hallways, stairs and passages between rooms well lit? Can you reach a lamp without getting out of bed? Are floor surfaces well maintained?  Shag rugs, high-pile carpeting, tile floors, and polished wood floors can be particularly slippery. Stairs should always have handrails and be carpeted or fitted with a non-skid tread. Is your telephone easily reachable. Is the cord safely tucked away? Room by Room   According to the Association of Aging, bathrooms and jennifer are the two most potentially hazardous rooms in your home. In the Kitchen    Be sure your stove is in proper working order and always make sure burners and the oven are off before you go out or go to sleep. Keep pots on the back burners, turn handles away from the front of the stove, and keep stove clean and free of grease build-up. Kitchen ventilation systems and range exhausts should be working properly. Keep flammable objects such as towels and pot holders away from the cooking area except when in use. Make sure kitchen curtains are tied back. Move cords and appliances away from the sink and hot surfaces. If extension cords are needed, install wiring guides so they do not hang over the sink, range, or working areas. Look for coffee pots, kettles and toaster ovens with automatic shut-offs. Keep a mop handy in the kitchen so you can wipe up spills instantly. You should also have a small fire extinguisher. Arrange your kitchen with frequently used items on lower shelves to avoid the need to stand on a stepstool to reach them. Make sure countertops are well-lit to avoid injuries while cutting and preparing food. In the Bathroom    Use a non-slip mat or decals in the tub and shower, since wet, soapy tile or porcelain surfaces are extremely slippery. Make sure bathroom rugs are non-skid or tape them firmly to the floor. Bathtubs should have at least one, preferably two, grab bars, firmly attached to structural supports in the wall. (Do not use built-in soap holders or glass shower doors as grab bars.)    Tub seats fitted with non-slip material on the legs allow you to wash sitting down.  For people with limited mobility, bathtub transfer benches allow you to slide safely into the tub. Raised toilet seats and toilet safety rails are helpful for those with knee or hip problems. In the [facility]    Make sure you use a nightlight and that the area around your bed is clear of potential obstacles. Be careful with electric blankets and never go to sleep with a heating pad, which can cause serious burns even if on a low setting. Use fire-resistant mattress covers and pillows, and NEVER smoke in bed. Keep a phone next to the bed that is programmed to dial 911 at the push of a button. If you have a chronic condition, you may want to sign on with an automatic call-in service. Typically the system includes a small pendant that connects directly to an emergency medical voice-response system. You should also make arrangements to stay in contact with someonefriend, neighbor, family memberon a regular schedule. Fire Prevention   According to the [employer]. (Smoke Alarms for Every) [address] [place] [address], senior citizens are one of the two highest risk groups for death and serious injuries due to residential fires. When cooking, wear short-sleeved items, never a bulky long-sleeved robe. The [facility]'s Safety Checklist for Older Consumers emphasizes the importance of checking basements, garages, workshops and storage areas for fire hazards, such as volatile liquids, piles of old rags or clothing and overloaded circuits. Never smoke in bed or when lying down on a couch or recliner chair. Small portable electric or kerosene heaters are responsible for many home fires and should be used cautiously if at all. If you do use one, be sure to keep them away from flammable materials. In case of fire, make sure you have a pre-established emergency exit plan. Have a professional check your fireplace and other fuel-burning appliances yearly.     Helping Hands   Baby boomers entering the hu years will continue to see the development of new products to help older adults live safely and independently in spite of age-related changes. Making Life More Livable  , by Rony [employer]herty, lists over 1,000 products for \"living well in the mature years,\" such as bathing and mobility aids, household security devices, ergonomically designed knives and peelers, and faucet valves and knobs for temperature control. Medical supply stores and organizations are good sources of information about products that improve your quality of life and insure your safety.      Last Reviewed: November 2009 Mayra Harvey MD   Updated: 3/7/2011     ·

## 2022-06-16 LAB — DIABETIC RETINOPATHY: NEGATIVE

## 2022-08-01 ENCOUNTER — IMMUNIZATION (OUTPATIENT)
Dept: PRIMARY CARE CLINIC | Age: 87
End: 2022-08-01
Payer: MEDICARE

## 2022-08-01 PROCEDURE — 0064A COVID-19, MODERNA BOOSTER BLUE BORDER, (AGE 18Y+), IM, 50MCG/0.25ML: CPT | Performed by: FAMILY MEDICINE

## 2022-08-01 PROCEDURE — 91306 COVID-19, MODERNA BOOSTER BLUE BORDER, (AGE 18Y+), IM, 50MCG/0.25ML: CPT | Performed by: FAMILY MEDICINE

## 2022-08-22 ENCOUNTER — TELEPHONE (OUTPATIENT)
Dept: FAMILY MEDICINE CLINIC | Age: 87
End: 2022-08-22

## 2022-08-22 ENCOUNTER — OFFICE VISIT (OUTPATIENT)
Dept: FAMILY MEDICINE CLINIC | Age: 87
End: 2022-08-22
Payer: MEDICARE

## 2022-08-22 VITALS
OXYGEN SATURATION: 100 % | BODY MASS INDEX: 30.96 KG/M2 | HEART RATE: 66 BPM | HEIGHT: 69 IN | SYSTOLIC BLOOD PRESSURE: 122 MMHG | DIASTOLIC BLOOD PRESSURE: 68 MMHG | RESPIRATION RATE: 20 BRPM | WEIGHT: 209 LBS

## 2022-08-22 DIAGNOSIS — E03.9 ACQUIRED HYPOTHYROIDISM: ICD-10-CM

## 2022-08-22 DIAGNOSIS — R55 SYNCOPE, UNSPECIFIED SYNCOPE TYPE: Primary | ICD-10-CM

## 2022-08-22 PROCEDURE — 99214 OFFICE O/P EST MOD 30 MIN: CPT | Performed by: FAMILY MEDICINE

## 2022-08-22 PROCEDURE — 1123F ACP DISCUSS/DSCN MKR DOCD: CPT | Performed by: FAMILY MEDICINE

## 2022-08-22 RX ORDER — LEVOTHYROXINE SODIUM 0.05 MG/1
TABLET ORAL
Qty: 90 TABLET | Refills: 1 | Status: SHIPPED
Start: 2022-08-22 | End: 2022-09-27 | Stop reason: DRUGHIGH

## 2022-08-22 RX ORDER — GABAPENTIN 100 MG/1
100 CAPSULE ORAL 2 TIMES DAILY
Qty: 180 CAPSULE | Refills: 1 | Status: SHIPPED | OUTPATIENT
Start: 2022-08-22 | End: 2023-08-22

## 2022-08-22 RX ORDER — TAMSULOSIN HYDROCHLORIDE 0.4 MG/1
0.4 CAPSULE ORAL DAILY
Qty: 90 CAPSULE | Refills: 1 | Status: SHIPPED | OUTPATIENT
Start: 2022-08-22

## 2022-08-22 SDOH — ECONOMIC STABILITY: FOOD INSECURITY: WITHIN THE PAST 12 MONTHS, THE FOOD YOU BOUGHT JUST DIDN'T LAST AND YOU DIDN'T HAVE MONEY TO GET MORE.: NEVER TRUE

## 2022-08-22 SDOH — ECONOMIC STABILITY: FOOD INSECURITY: WITHIN THE PAST 12 MONTHS, YOU WORRIED THAT YOUR FOOD WOULD RUN OUT BEFORE YOU GOT MONEY TO BUY MORE.: NEVER TRUE

## 2022-08-22 ASSESSMENT — ENCOUNTER SYMPTOMS
VOMITING: 0
NAUSEA: 0
DIARRHEA: 0
SHORTNESS OF BREATH: 0
VISUAL CHANGE: 0

## 2022-08-22 ASSESSMENT — LIFESTYLE VARIABLES: HOW OFTEN DO YOU HAVE A DRINK CONTAINING ALCOHOL: NEVER

## 2022-08-22 NOTE — TELEPHONE ENCOUNTER
I rec'd a call on the Nurse Triage Line stating patient has been having episodes of passing out. The call was warm transferred and patient stated he's had a few occasions where he's passed out, the most recent was this past Saturday, August 20, and he lost control of all of his functions. I asked if patient went to the ED and he informed, no. Patient informed that this happened on 4/20/22 and 7/19/22. Patient stated when it first happened he thought it was his blood sugar, but it's been running anywhere from a low of 96 to a high of 123. Patient stated he's feeling better now and just came back from the gym. I offered patient an appt today with Dr. Driver [place] at 4:00 pm.  Patient was agreeable and confirmed.       Last seen 3/2/2022  Next appt 8/22/2022

## 2022-08-22 NOTE — PROGRESS NOTES
[address] [facility], [address]   [address] [facility]   Daisha Nicole MD     Patient: Carolyne Caballero Birth: 1935  Visit Date: 8/22/22    Brittany Michelle is a 81y.o. year old male here today for   Chief Complaint   Patient presents with    Loss of Consciousness     X 2  does not remember anything , did not go to ER       HPI  Loss of Consciousness  This is a recurrent problem. Episode onset: 3 blackout episodes in April, July and August. The problem occurs intermittently. The problem has been waxing and waning. He lost consciousness for a period of 1 to 5 minutes. Nothing (Has occurred in early afternoons, even after eating lunch) aggravates the symptoms. Pertinent negatives include no chest pain, dizziness, nausea, palpitations, vertigo, visual change or vomiting. Patient saw his cardiologist, and he ordered a 3-day holter monitor which was done 8/5-8/8, and he has not been called to say it was abnormal.  Also had normal carotid ultrasounds earlier this year. Review of Systems   Eyes:  Negative for visual disturbance. Respiratory:  Negative for shortness of breath. Cardiovascular:  Positive for syncope. Negative for chest pain, palpitations and leg swelling. Gastrointestinal:  Negative for diarrhea, nausea and vomiting. Genitourinary:  Negative for difficulty urinating, dysuria and frequency. Skin:  Negative for rash. Neurological:  Positive for syncope. Negative for dizziness and vertigo. Past medical, surgical, social and/or family historyreviewed, updated as needed, and are non-contributory (unless otherwise stated). Medications, allergies, and problem list also reviewed and updated as needed in patient's record.      Current Outpatient Medications   Medication Sig Dispense Refill    tamsulosin (FLOMAX) 0.4 MG capsule Take 1 capsule by mouth daily 90 capsule 1    gabapentin (NEURONTIN) 100 MG capsule Take 1 capsule by mouth 2 times daily. 180 capsule 1    levothyroxine (SYNTHROID) 50 MCG tablet TAKE ONE TABLET BY MOUTH DAILY 90 tablet 1    Handicap Placard MISC by Does not apply route Reason for disability:  Arthritis of sacroiliac joint  (primary encounter diagnosis)  Lumbar spondylosis  Facet arthropathy  Impaired gait    Duration: 5 years 1 each 0    blood glucose test strips (ONE TOUCH ULTRA TEST) strip Check blood glucose qam 50 each 12    ONE TOUCH ULTRASOFT LANCETS MISC Check blood sugar qam 50 each 12    docusate sodium (COLACE) 100 MG capsule Take 100 mg by mouth daily      Lancet Device MISC Use daily for glucose monitoring 1 Device 0    Blood Glucose Monitoring Suppl (ONE TOUCH ULTRA 2) w/Device KIT Use daily for glucose monitoring 1 kit 0    ONE TOUCH LANCETS MISC Check blood glucose qam 100 each 12    atorvastatin (LIPITOR) 10 MG tablet Take 10 mg by mouth daily       metoprolol (LOPRESSOR) 25 MG tablet Take 0.5 tablets by mouth 2 times daily. (Patient taking differently: Take 25 mg by mouth daily) 60 tablet 1    aspirin 81 MG tablet Take 81 mg by mouth daily. multivitamin (THERAGRAN) per tablet Take 1 tablet by mouth daily. calcium carbonate-vitamin D 600-200 MG-UNIT TABS Take 600 mg by mouth. No current facility-administered medications for this visit. Wt Readings from Last 3 Encounters:   08/22/22 209 lb (94.8 kg)   03/02/22 211 lb (95.7 kg)   10/18/21 204 lb 6.4 oz (92.7 kg)                   Vitals:    08/22/22 1607   BP: 122/68   Pulse: 66   Resp: 20   SpO2: 100%       Physical Exam  Vitals reviewed. Constitutional:       General: He is not in acute distress. Appearance: He is well-developed. Neck:      Vascular: No carotid bruit. Cardiovascular:      Rate and Rhythm: Normal rate and regular rhythm. Heart sounds: Normal heart sounds. No murmur heard. No gallop. Pulmonary:      Effort: Pulmonary effort is normal.      Breath sounds: Normal breath sounds.  No wheezing or rales.   Abdominal:      General: Bowel sounds are normal. There is no distension. Palpations: Abdomen is soft. Tenderness: There is no abdominal tenderness. Musculoskeletal:      Cervical back: Neck supple. Right lower leg: No edema. Left lower leg: No edema. Skin:     General: Skin is warm and dry. Neurological:      Mental Status: He is alert and oriented to person, place, and time. ASSESSMENT/PLAN  Zach Garcia was seen today for loss of consciousness. Diagnoses and all orders for this visit:    Syncope, unspecified syncope type  -     CT HEAD WO CONTRAST; Future        -     patient advised to go to ED if symptoms recur; patient and wife relayed understanding    Acquired hypothyroidism  -     levothyroxine (SYNTHROID) 50 MCG tablet; TAKE ONE TABLET BY MOUTH DAILY  -     CBC; Future  -     Comprehensive Metabolic Panel; Future  -     Lipid Panel; Future  -     TSH; Future    /MyChart follow up if tests abnormal.    Return in about 1 month (around 9/22/2022) for diabetes, f/u syncope. or sooner if necessary. I have reviewed my findings and recommendations with Zach Garcia.      Vielka Terrazas MD, M.D

## 2022-08-24 ENCOUNTER — HOSPITAL ENCOUNTER (OUTPATIENT)
Age: 87
Discharge: HOME | End: 2022-08-24
Payer: MEDICARE

## 2022-08-24 DIAGNOSIS — E03.9 ACQUIRED HYPOTHYROIDISM: ICD-10-CM

## 2022-08-24 LAB
ALBUMIN SERPL-MCNC: 3.9 G/DL (ref 3.5–5.2)
ALP BLD-CCNC: 71 U/L (ref 40–129)
ALT SERPL-CCNC: 14 U/L (ref 0–40)
ANION GAP SERPL CALCULATED.3IONS-SCNC: 8 MMOL/L (ref 7–16)
AST SERPL-CCNC: 16 U/L (ref 0–39)
BILIRUB SERPL-MCNC: 0.4 MG/DL (ref 0–1.2)
BUN BLDV-MCNC: 20 MG/DL (ref 6–23)
CALCIUM SERPL-MCNC: 9.5 MG/DL (ref 8.6–10.2)
CHLORIDE BLD-SCNC: 103 MMOL/L (ref 98–107)
CHOLESTEROL, TOTAL: 135 MG/DL (ref 0–199)
CO2: 29 MMOL/L (ref 22–29)
CREAT SERPL-MCNC: 1.2 MG/DL (ref 0.7–1.2)
GFR AFRICAN AMERICAN: >60
GFR NON-AFRICAN AMERICAN: 57 ML/MIN/1.73
GLUCOSE BLD-MCNC: 119 MG/DL (ref 74–99)
HCT VFR BLD CALC: 42.3 % (ref 37–54)
HDLC SERPL-MCNC: 35 MG/DL
HEMOGLOBIN: 14 G/DL (ref 12.5–16.5)
LDL CHOLESTEROL CALCULATED: 80 MG/DL (ref 0–99)
MCH RBC QN AUTO: 31.7 PG (ref 26–35)
MCHC RBC AUTO-ENTMCNC: 33.1 % (ref 32–34.5)
MCV RBC AUTO: 95.7 FL (ref 80–99.9)
PDW BLD-RTO: 14.4 FL (ref 11.5–15)
PLATELET # BLD: 175 E9/L (ref 130–450)
PMV BLD AUTO: 8.2 FL (ref 7–12)
POTASSIUM SERPL-SCNC: 5 MMOL/L (ref 3.5–5)
RBC # BLD: 4.42 E12/L (ref 3.8–5.8)
SODIUM BLD-SCNC: 140 MMOL/L (ref 132–146)
TOTAL PROTEIN: 6.8 G/DL (ref 6.4–8.3)
TRIGL SERPL-MCNC: 98 MG/DL (ref 0–149)
TSH SERPL DL<=0.05 MIU/L-ACNC: 3.43 UIU/ML (ref 0.27–4.2)
VLDLC SERPL CALC-MCNC: 20 MG/DL
WBC # BLD: 5.3 E9/L (ref 4.5–11.5)

## 2022-08-24 PROCEDURE — 80061 LIPID PANEL: CPT

## 2022-08-24 PROCEDURE — 80053 COMPREHEN METABOLIC PANEL: CPT

## 2022-08-24 PROCEDURE — 36415 COLL VENOUS BLD VENIPUNCTURE: CPT

## 2022-08-24 PROCEDURE — 85027 COMPLETE CBC AUTOMATED: CPT

## 2022-08-24 PROCEDURE — 84443 ASSAY THYROID STIM HORMONE: CPT

## 2022-08-31 ENCOUNTER — HOSPITAL ENCOUNTER (OUTPATIENT)
Dept: CT IMAGING | Age: 87
Discharge: HOME | End: 2022-08-31
Payer: MEDICARE

## 2022-08-31 DIAGNOSIS — R55 SYNCOPE, UNSPECIFIED SYNCOPE TYPE: ICD-10-CM

## 2022-08-31 PROCEDURE — 70450 CT HEAD/BRAIN W/O DYE: CPT

## 2022-09-02 ENCOUNTER — TELEPHONE (OUTPATIENT)
Dept: FAMILY MEDICINE CLINIC | Age: 87
End: 2022-09-02

## 2022-09-06 NOTE — TELEPHONE ENCOUNTER
Notified his wife Patricia Falk. She will let Miguel A Lovell know. He was unavailable at time of call.

## 2022-09-22 DIAGNOSIS — E11.9 TYPE 2 DIABETES MELLITUS WITHOUT COMPLICATION, WITHOUT LONG-TERM CURRENT USE OF INSULIN (HCC): ICD-10-CM

## 2022-09-25 RX ORDER — LANCETS
EACH MISCELLANEOUS
Qty: 50 EACH | Refills: 12 | Status: SHIPPED | OUTPATIENT
Start: 2022-09-25

## 2022-09-27 ENCOUNTER — OFFICE VISIT (OUTPATIENT)
Dept: FAMILY MEDICINE CLINIC | Age: 87
End: 2022-09-27
Payer: MEDICARE

## 2022-09-27 VITALS
SYSTOLIC BLOOD PRESSURE: 126 MMHG | OXYGEN SATURATION: 97 % | HEIGHT: 69 IN | DIASTOLIC BLOOD PRESSURE: 72 MMHG | HEART RATE: 60 BPM | BODY MASS INDEX: 30.51 KG/M2 | RESPIRATION RATE: 20 BRPM | WEIGHT: 206 LBS

## 2022-09-27 DIAGNOSIS — E11.9 TYPE 2 DIABETES MELLITUS WITHOUT COMPLICATION, WITHOUT LONG-TERM CURRENT USE OF INSULIN (HCC): Primary | ICD-10-CM

## 2022-09-27 DIAGNOSIS — E03.9 ACQUIRED HYPOTHYROIDISM: ICD-10-CM

## 2022-09-27 DIAGNOSIS — Z23 NEED FOR PROPHYLACTIC VACCINATION AND INOCULATION AGAINST INFLUENZA: ICD-10-CM

## 2022-09-27 LAB — HBA1C MFR BLD: 6 %

## 2022-09-27 PROCEDURE — G0008 ADMIN INFLUENZA VIRUS VAC: HCPCS | Performed by: FAMILY MEDICINE

## 2022-09-27 PROCEDURE — 90694 VACC AIIV4 NO PRSRV 0.5ML IM: CPT | Performed by: FAMILY MEDICINE

## 2022-09-27 PROCEDURE — 99214 OFFICE O/P EST MOD 30 MIN: CPT | Performed by: FAMILY MEDICINE

## 2022-09-27 PROCEDURE — 1123F ACP DISCUSS/DSCN MKR DOCD: CPT | Performed by: FAMILY MEDICINE

## 2022-09-27 PROCEDURE — 83036 HEMOGLOBIN GLYCOSYLATED A1C: CPT | Performed by: FAMILY MEDICINE

## 2022-09-27 PROCEDURE — 3044F HG A1C LEVEL LT 7.0%: CPT | Performed by: FAMILY MEDICINE

## 2022-09-27 RX ORDER — LEVOTHYROXINE SODIUM 0.05 MG/1
TABLET ORAL
Qty: 90 TABLET | Refills: 1 | Status: SHIPPED
Start: 2022-09-27

## 2022-09-27 ASSESSMENT — PATIENT HEALTH QUESTIONNAIRE - PHQ9
SUM OF ALL RESPONSES TO PHQ QUESTIONS 1-9: 0
2. FEELING DOWN, DEPRESSED OR HOPELESS: 0
SUM OF ALL RESPONSES TO PHQ9 QUESTIONS 1 & 2: 0
SUM OF ALL RESPONSES TO PHQ QUESTIONS 1-9: 0
1. LITTLE INTEREST OR PLEASURE IN DOING THINGS: 0

## 2022-09-27 ASSESSMENT — ENCOUNTER SYMPTOMS
DIARRHEA: 0
SHORTNESS OF BREATH: 0
VOMITING: 0
NAUSEA: 0

## 2022-09-27 NOTE — PROGRESS NOTES
OFFICE PROGRESS NOTE      SUBJECTIVE:        Patient ID:   Nikos Whittington is a 81 y.o. male whopresents for   Chief Complaint   Patient presents with    Diabetes           HPI:   Patient is here to follow up on diabetes. Fasting blood sugars: Midday blood sugars: not checking. Patient checks blood glucose 1 times per day. Patient is following diabetic diet. Patient is a nonsmoker. Last ophthalmology visit: 6/2022. Patient is taking a daily statin. Recent lab results reviewed including CMP, CBC, TSH, and lipid panel which are not remarkable. Last urine microalbumin: 9/2021    Patient doing well on current regimen for hypothyroidism. No further syncopal episodes have occurred. Will see cardiology soon. Is scheduled for stress test.    Prior to Admission medications    Medication Sig Start Date End Date Taking? Authorizing Provider   levothyroxine (SYNTHROID) 50 MCG tablet TAKE ONE TABLET BY MOUTH DAILY 9/27/22  Yes Juan Luis Lassiter MD   ONE TOUCH ULTRASOFT LANCETS MISC Check blood sugar qam 9/25/22  Yes Juan Luis Lassiter MD   blood glucose test strips (ONE TOUCH ULTRA TEST) strip Check blood glucose qam 9/25/22  Yes Juan Luis Lassiter MD   tamsulosin (FLOMAX) 0.4 MG capsule Take 1 capsule by mouth daily 8/22/22  Yes Juan Luis Lassiter MD   gabapentin (NEURONTIN) 100 MG capsule Take 1 capsule by mouth 2 times daily.  8/22/22 8/22/23 Yes Juan Luis Lassiter MD   Handicap Placard MISC by Does not apply route Reason for disability:  Arthritis of sacroiliac joint  (primary encounter diagnosis)  Lumbar spondylosis  Facet arthropathy  Impaired gait    Duration: 5 years 9/27/21  Yes Gladys Christine, DO   docusate sodium (COLACE) 100 MG capsule Take 100 mg by mouth daily   Yes Historical Provider, MD   Lancet Device MISC Use daily for glucose monitoring 3/9/20  Yes Juan Luis Lassiter MD   Blood Glucose Monitoring Suppl (ONE TOUCH ULTRA 2) w/Device KIT Use daily for glucose monitoring 3/9/20  Yes Gavin Jimenez MD   [facility] Check blood glucose qam 19  Yes Gavin Jimenez MD   atorvastatin (LIPITOR) 10 MG tablet Take 10 mg by mouth daily  6/12/15  Yes Historical Provider, MD   metoprolol (LOPRESSOR) 25 MG tablet Take 0.5 tablets by mouth 2 times daily. Patient taking differently: Take 25 mg by mouth daily 3/30/13  Yes Dariana Scott MD   aspirin 81 MG tablet Take 81 mg by mouth daily. Yes Historical Provider, MD   multivitamin [facility]) per tablet Take 1 tablet by mouth daily. Yes Historical Provider, MD   calcium carbonate-vitamin D 600-200 MG-UNIT TABS Take 600 mg by mouth.    Yes Historical Provider, MD     Social History     Socioeconomic History    Marital status:      Spouse name: None    Number of children: None    Years of education: None    Highest education level: None   Tobacco Use    Smoking status: Former     Years: 32.00     Types: Cigarettes     Quit date: 3/26/1987     Years since quittin.5    Smokeless tobacco: Former   Vaping Use    Vaping Use: Never used   Substance and Sexual Activity    Alcohol use: No     Alcohol/week: 0.0 standard drinks    Drug use: No    Sexual activity: Yes     Partners: Female     Social Determinants of Health     Food Insecurity: No Food Insecurity    Worried About Running Out of Food in the Last Year: Never true    Ran Out of Food in the Last Year: Never true   Physical Activity: Insufficiently Active    Days of Exercise per Week: 4 days    Minutes of Exercise per Session: 30 min   Stress: No Stress Concern Present    Feeling of Stress : Not at all       I have reviewed Hernando's allergies, medications, problem list, medical, social and family history and have updated as needed in the electronic medical record    Current Outpatient Medications   Medication Sig Dispense Refill    levothyroxine (SYNTHROID) 50 MCG tablet TAKE ONE TABLET BY MOUTH DAILY 90 tablet 1 ONE TOUCH ULTRASOFT LANCETS MISC Check blood sugar qam 50 each 12    blood glucose test strips (ONE TOUCH ULTRA TEST) strip Check blood glucose qam 50 each 12    tamsulosin (FLOMAX) 0.4 MG capsule Take 1 capsule by mouth daily 90 capsule 1    gabapentin (NEURONTIN) 100 MG capsule Take 1 capsule by mouth 2 times daily. 180 capsule 1    Handicap Placard MISC by Does not apply route Reason for disability:  Arthritis of sacroiliac joint  (primary encounter diagnosis)  Lumbar spondylosis  Facet arthropathy  Impaired gait    Duration: 5 years 1 each 0    docusate sodium (COLACE) 100 MG capsule Take 100 mg by mouth daily      Lancet Device MISC Use daily for glucose monitoring 1 Device 0    Blood Glucose Monitoring Suppl (ONE TOUCH ULTRA 2) w/Device KIT Use daily for glucose monitoring 1 kit 0    ONE TOUCH LANCETS MISC Check blood glucose qam 100 each 12    atorvastatin (LIPITOR) 10 MG tablet Take 10 mg by mouth daily       metoprolol (LOPRESSOR) 25 MG tablet Take 0.5 tablets by mouth 2 times daily. (Patient taking differently: Take 25 mg by mouth daily) 60 tablet 1    aspirin 81 MG tablet Take 81 mg by mouth daily. multivitamin (THERAGRAN) per tablet Take 1 tablet by mouth daily. calcium carbonate-vitamin D 600-200 MG-UNIT TABS Take 600 mg by mouth. No current facility-administered medications for this visit. Review Of Systems:    Review of Systems   Eyes:  Negative for visual disturbance. Respiratory:  Negative for shortness of breath. Cardiovascular:  Negative for chest pain, palpitations and leg swelling. Gastrointestinal:  Negative for diarrhea, nausea and vomiting. Genitourinary:  Negative for difficulty urinating, dysuria and frequency. Skin:  Negative for rash. Psychiatric/Behavioral:  Negative for dysphoric mood.           OBJECTIVE:     VS:  Wt Readings from Last 3 Encounters:   09/27/22 206 lb (93.4 kg)   08/22/22 209 lb (94.8 kg)   03/02/22 211 lb (95.7 kg)     Vitals: 09/27/22 0911   BP: 126/72   Pulse: 60   Resp: 20   SpO2: 97%       Physical Exam  Vitals reviewed. Constitutional:       General: He is not in acute distress. Appearance: He is well-developed. Neck:      Vascular: No carotid bruit. Cardiovascular:      Rate and Rhythm: Normal rate and regular rhythm. Heart sounds: Normal heart sounds. No murmur heard. No gallop. Pulmonary:      Effort: Pulmonary effort is normal.      Breath sounds: Normal breath sounds. No wheezing or rales. Abdominal:      General: Bowel sounds are normal. There is no distension. Palpations: Abdomen is soft. Tenderness: There is no abdominal tenderness. Musculoskeletal:      Cervical back: Neck supple. Right lower leg: No edema. Left lower leg: No edema. Skin:     General: Skin is warm and dry. Neurological:      Mental Status: He is alert and oriented to person, place, and time. Results for orders placed or performed in visit on 09/27/22   POCT glycosylated hemoglobin (Hb A1C)   Result Value Ref Range    Hemoglobin A1C 6.0 %         Ferdinand Moreno was seen today for diabetes. Diagnoses and all orders for this visit:    Type 2 diabetes mellitus without complication, without long-term current use of insulin (HCC)  -     POCT glycosylated hemoglobin (Hb A1C)        -     continue diet control    Acquired hypothyroidism  -     levothyroxine (SYNTHROID) 50 MCG tablet; TAKE ONE TABLET BY MOUTH DAILY    Need for prophylactic vaccination and inoculation against influenza  -     Influenza, FLUAD, (age 72 y+), IM, Preservative Free, 0.5 mL        Phone/MyChart follow up if tests abnormal.    Return in about 6 months (around 3/27/2023) for Annual Medicare Wellness Visit--30 minutes. I have reviewed my findings and recommendations with Colten Hickey.     Arianne Stone MD, M.D

## 2023-03-06 ENCOUNTER — OFFICE VISIT (OUTPATIENT)
Dept: FAMILY MEDICINE CLINIC | Age: 88
End: 2023-03-06
Payer: MEDICARE

## 2023-03-06 VITALS
BODY MASS INDEX: 31.52 KG/M2 | SYSTOLIC BLOOD PRESSURE: 122 MMHG | RESPIRATION RATE: 20 BRPM | WEIGHT: 208 LBS | HEART RATE: 73 BPM | HEIGHT: 68 IN | DIASTOLIC BLOOD PRESSURE: 74 MMHG | OXYGEN SATURATION: 97 %

## 2023-03-06 DIAGNOSIS — Z12.5 ENCOUNTER FOR SCREENING FOR MALIGNANT NEOPLASM OF PROSTATE: ICD-10-CM

## 2023-03-06 DIAGNOSIS — N40.1 BENIGN PROSTATIC HYPERPLASIA WITH URINARY FREQUENCY: ICD-10-CM

## 2023-03-06 DIAGNOSIS — R35.0 BENIGN PROSTATIC HYPERPLASIA WITH URINARY FREQUENCY: ICD-10-CM

## 2023-03-06 DIAGNOSIS — E03.9 ACQUIRED HYPOTHYROIDISM: ICD-10-CM

## 2023-03-06 DIAGNOSIS — M46.1 SACROILIITIS (HCC): ICD-10-CM

## 2023-03-06 DIAGNOSIS — E11.9 TYPE 2 DIABETES MELLITUS WITHOUT COMPLICATION, WITHOUT LONG-TERM CURRENT USE OF INSULIN (HCC): ICD-10-CM

## 2023-03-06 DIAGNOSIS — Z00.00 MEDICARE ANNUAL WELLNESS VISIT, SUBSEQUENT: Primary | ICD-10-CM

## 2023-03-06 LAB
BILIRUBIN, POC: NORMAL
BLOOD URINE, POC: NORMAL
CLARITY, POC: CLEAR
COLOR, POC: YELLOW
CREATININE URINE POCT: NORMAL
GLUCOSE URINE, POC: NORMAL
HBA1C MFR BLD: 6.1 %
KETONES, POC: NORMAL
LEUKOCYTE EST, POC: NORMAL
MICROALBUMIN/CREAT 24H UR: NORMAL MG/G{CREAT}
MICROALBUMIN/CREAT UR-RTO: NORMAL
NITRITE, POC: NORMAL
PH, POC: 5.5
PROTEIN, POC: NORMAL
SPECIFIC GRAVITY, POC: 1.02
UROBILINOGEN, POC: 0.2

## 2023-03-06 PROCEDURE — 81002 URINALYSIS NONAUTO W/O SCOPE: CPT | Performed by: FAMILY MEDICINE

## 2023-03-06 PROCEDURE — G0439 PPPS, SUBSEQ VISIT: HCPCS | Performed by: FAMILY MEDICINE

## 2023-03-06 PROCEDURE — 83036 HEMOGLOBIN GLYCOSYLATED A1C: CPT | Performed by: FAMILY MEDICINE

## 2023-03-06 PROCEDURE — 82044 UR ALBUMIN SEMIQUANTITATIVE: CPT | Performed by: FAMILY MEDICINE

## 2023-03-06 PROCEDURE — 3044F HG A1C LEVEL LT 7.0%: CPT | Performed by: FAMILY MEDICINE

## 2023-03-06 PROCEDURE — 1123F ACP DISCUSS/DSCN MKR DOCD: CPT | Performed by: FAMILY MEDICINE

## 2023-03-06 RX ORDER — FINASTERIDE 5 MG/1
5 TABLET, FILM COATED ORAL DAILY
Qty: 90 TABLET | Refills: 1 | Status: SHIPPED | OUTPATIENT
Start: 2023-03-06

## 2023-03-06 RX ORDER — GABAPENTIN 100 MG/1
100 CAPSULE ORAL 2 TIMES DAILY
Qty: 180 CAPSULE | Refills: 1 | Status: SHIPPED | OUTPATIENT
Start: 2023-03-06 | End: 2024-03-05

## 2023-03-06 RX ORDER — TAMSULOSIN HYDROCHLORIDE 0.4 MG/1
0.4 CAPSULE ORAL DAILY
Qty: 90 CAPSULE | Refills: 1 | Status: SHIPPED | OUTPATIENT
Start: 2023-03-06

## 2023-03-06 RX ORDER — LEVOTHYROXINE SODIUM 0.05 MG/1
TABLET ORAL
Qty: 90 TABLET | Refills: 1 | Status: SHIPPED | OUTPATIENT
Start: 2023-03-06

## 2023-03-06 SDOH — ECONOMIC STABILITY: HOUSING INSECURITY
IN THE LAST 12 MONTHS, WAS THERE A TIME WHEN YOU DID NOT HAVE A STEADY PLACE TO SLEEP OR SLEPT IN A SHELTER (INCLUDING NOW)?: PATIENT REFUSED

## 2023-03-06 SDOH — ECONOMIC STABILITY: FOOD INSECURITY: WITHIN THE PAST 12 MONTHS, YOU WORRIED THAT YOUR FOOD WOULD RUN OUT BEFORE YOU GOT MONEY TO BUY MORE.: PATIENT DECLINED

## 2023-03-06 SDOH — ECONOMIC STABILITY: FOOD INSECURITY: WITHIN THE PAST 12 MONTHS, THE FOOD YOU BOUGHT JUST DIDN'T LAST AND YOU DIDN'T HAVE MONEY TO GET MORE.: PATIENT DECLINED

## 2023-03-06 SDOH — ECONOMIC STABILITY: INCOME INSECURITY: HOW HARD IS IT FOR YOU TO PAY FOR THE VERY BASICS LIKE FOOD, HOUSING, MEDICAL CARE, AND HEATING?: PATIENT DECLINED

## 2023-03-06 ASSESSMENT — PATIENT HEALTH QUESTIONNAIRE - PHQ9
SUM OF ALL RESPONSES TO PHQ QUESTIONS 1-9: 0
1. LITTLE INTEREST OR PLEASURE IN DOING THINGS: 0
2. FEELING DOWN, DEPRESSED OR HOPELESS: 0
SUM OF ALL RESPONSES TO PHQ QUESTIONS 1-9: 0
SUM OF ALL RESPONSES TO PHQ QUESTIONS 1-9: 0
SUM OF ALL RESPONSES TO PHQ9 QUESTIONS 1 & 2: 0
SUM OF ALL RESPONSES TO PHQ QUESTIONS 1-9: 0

## 2023-03-06 ASSESSMENT — LIFESTYLE VARIABLES: HOW OFTEN DO YOU HAVE A DRINK CONTAINING ALCOHOL: MONTHLY OR LESS

## 2023-03-06 NOTE — PROGRESS NOTES
Medicare Annual Wellness Visit      Ramsey Kapoor is here for Medicare AWV    Assessment & Plan   Medicare annual wellness visit, subsequent  Acquired hypothyroidism  -     levothyroxine (SYNTHROID) 50 MCG tablet; TAKE ONE TABLET BY MOUTH DAILY,   -     TSH; Future  Type 2 diabetes mellitus without complication, without long-term current use of insulin (HCC)  -     POCT glycosylated hemoglobin (Hb A1C)  -     POCT Microalbumin  -     POCT Urinalysis no Micro  -     CBC; Future  -     Comprehensive Metabolic Panel; Future  -     Lipid Panel; Future  Benign prostatic hyperplasia with urinary frequency  -     tamsulosin (FLOMAX) 0.4 MG capsule; Take 1 capsule by mouth daily,   -     start finasteride (PROSCAR) 5 MG tablet; Take 1 tablet by mouth daily,   Sacroiliitis (HCC)        -     Stable; will assess yearly  Encounter for screening for malignant neoplasm of prostate  -     PSA Screening; Future    Recommendations for Preventive Services Due: see orders and patient instructions/AVS.  Recommended screening schedule for the next 5-10 years is provided to the patient in written form: see Patient Instructions/AVS.     Return in 6 months (on 9/6/2023) for Medicare Annual Wellness Visit in 1 year, AND 6 MONTH diabetes. Subjective   Patient is here to follow up on diabetes. Fasting blood sugars: Midday blood sugars: not checking. Patient checks blood glucose 1 times per day. Patient is following diabetic diet. Patient is a nonsmoker. Last ophthalmology visit: 6/2022. Patient is taking a daily statin. Last urine microalbumin: today    Patient has noticed worsening urinary frequency, especially at night. Unable to fall right back to sleep. Still taking Flomax. Patient still having back pain due to sacroiliitis. Plans to follow up with Dr. Noble Romberg soon. Patient's complete Health Risk Assessment and screening values have been reviewed and are found in Flowsheets.  The following problems were reviewed today and where indicated follow up appointments were made and/or referrals ordered.    Positive Risk Factor Screenings with Interventions:                 Weight and Activity:  Physical Activity: Sufficiently Active    Days of Exercise per Week: 3 days    Minutes of Exercise per Session: 60 min     On average, how many days per week do you engage in moderate to strenuous exercise (like a brisk walk)?: 3 days  Have you lost any weight without trying in the past 3 months?: No  Body mass index: (!) 31.62  Obesity Interventions:  Patient declines any further evaluation or treatment           Hearing Screen:  Do you or your family notice any trouble with your hearing that hasn't been managed with hearing aids?: (!) Yes    Interventions:  Patient declines any further evaluation or treatment                       Objective   Vitals:    03/06/23 0830   BP: 122/74   Pulse: 73   Resp: 20   SpO2: 97%   Weight: 208 lb (94.3 kg)   Height: 5' 8\" (1.727 m)      Body mass index is 31.63 kg/m².      Physical Exam  Vitals reviewed.   Constitutional:       General: He is not in acute distress.     Appearance: He is well-developed.   Neck:      Vascular: No carotid bruit.   Cardiovascular:      Rate and Rhythm: Normal rate and regular rhythm.      Heart sounds: Normal heart sounds. No murmur heard.    No gallop.   Pulmonary:      Effort: Pulmonary effort is normal.      Breath sounds: Normal breath sounds. No wheezing or rales.   Abdominal:      General: Bowel sounds are normal. There is no distension.      Palpations: Abdomen is soft.      Tenderness: There is no abdominal tenderness.   Musculoskeletal:      Cervical back: Neck supple.      Right lower leg: No edema.      Left lower leg: No edema.   Skin:     General: Skin is warm and dry.   Neurological:      Mental Status: He is alert and oriented to person, place, and time.            No Known Allergies  Prior to Visit Medications    Medication Sig Taking? Authorizing  Provider   levothyroxine (SYNTHROID) 50 MCG tablet TAKE ONE TABLET BY MOUTH DAILY Yes Lourdes Ahn MD   gabapentin (NEURONTIN) 100 MG capsule Take 1 capsule by mouth 2 times daily. Yes Lourdes Ahn MD   tamsulosin (FLOMAX) 0.4 MG capsule Take 1 capsule by mouth daily Yes Lourdes Ahn MD   finasteride (PROSCAR) 5 MG tablet Take 1 tablet by mouth daily Yes Lourdes Ahn MD   ONE TOUCH ULTRASOFT LANCETS MISC Check blood sugar qam Yes Lourdes Ahn MD   blood glucose test strips (ONE TOUCH ULTRA TEST) strip Check blood glucose qam Yes Lourdes Ahn MD   Handicap Placard MISC by Does not apply route Reason for disability:  Arthritis of sacroiliac joint  (primary encounter diagnosis)  Lumbar spondylosis  Facet arthropathy  Impaired gait    Duration: 5 years Yes Gio Sanchez DO   docusate sodium (COLACE) 100 MG capsule Take 100 mg by mouth daily Yes Historical Provider, MD   Lancet Device MISC Use daily for glucose monitoring Yes Lourdes Ahn MD   Blood Glucose Monitoring Suppl (ONE TOUCH ULTRA 2) w/Device KIT Use daily for glucose monitoring Yes Lourdes Ahn MD   ONE TOUCH LANCETS MISC Check blood glucose qam Yes Lourdes Ahn MD   atorvastatin (LIPITOR) 10 MG tablet Take 10 mg by mouth daily  Yes Historical Provider, MD   metoprolol (LOPRESSOR) 25 MG tablet Take 0.5 tablets by mouth 2 times daily. Patient taking differently: Take 25 mg by mouth daily Yes Maco Espino MD   aspirin 81 MG tablet Take 81 mg by mouth daily. Yes Historical Provider, MD   multivitamin [facility]) per tablet Take 1 tablet by mouth daily. Yes Historical Provider, MD   calcium carbonate-vitamin D 600-200 MG-UNIT TABS Take 600 mg by mouth.  Yes Historical Provider, MD [place] (Including outside providers/suppliers regularly involved in providing care):   Patient Care Team:  Lourdes Ahn MD as PCP - General (Family Medicine)  Coty Rasmussen MD as PCP - Empaneled Provider  Darci Guzman MD as Consulting Physician (Cardiothoracic Surgery)  Tasha Barbosa MD as Consulting Physician (Cardiology)     Reviewed and updated this visit:  Tobacco  Allergies  Meds  Problems  Med Hx  Surg Hx  Soc Hx  Fam Hx               Coty Rasmussen MD

## 2023-03-06 NOTE — PATIENT INSTRUCTIONS
Advance Directives: Care Instructions  Overview  An advance directive is a legal way to state your wishes at the end of your life. It tells your family and your doctor what to do if you can't say what you want. There are two main types of advance directives. You can change them any time your wishes change. Living will. This form tells your family and your doctor your wishes about life support and other treatment. The form is also called a declaration. Medical power of . This form lets you name a person to make treatment decisions for you when you can't speak for yourself. This person is called a health care agent (health care proxy, health care surrogate). The form is also called a durable power of  for health care. If you do not have an advance directive, decisions about your medical care may be made by a family member, or by a doctor or a  who doesn't know you. It may help to think of an advance directive as a gift to the people who care for you. If you have one, they won't have to make tough decisions by themselves. For more information, including forms for your state, see the [address] website (www.caringinfo.org/planning/advance-directives/). Follow-up care is a key part of your treatment and safety. Be sure to make and go to all appointments, and call your doctor if you are having problems. It's also a good idea to know your test results and keep a list of the medicines you take. What should you include in an advance directive? Many states have a unique advance directive form. (It may ask you to address specific issues.) Or you might use a universal form that's approved by many states. If your form doesn't tell you what to address, it may be hard to know what to include in your advance directive. Use the questions below to help you get started. Who do you want to make decisions about your medical care if you are not able to?   What life-support measures do you want if you have a serious illness that gets worse over time or can't be cured? What are you most afraid of that might happen? (Maybe you're afraid of having pain, losing your independence, or being kept alive by machines.)  Where would you prefer to die? (Your home? A hospital? A nursing home?)  Do you want to donate your organs when you die? Do you want certain Tenriism practices performed before you die? When should you call for help? Be sure to contact your doctor if you have any questions. Where can you learn more? Go to http://www.wright.com/ and enter R264 to learn more about \"Advance Directives: Care Instructions. \"  Current as of: June 16, 2022               Content Version: 13.5  © 5421-9336 Healthwise, [employer]. Care instructions adapted under license by [facility] ([facility]). If you have questions about a medical condition or this instruction, always ask your healthcare professional. [place] [address] any warranty or liability for your use of this information. Starting a Weight Loss Plan: Care Instructions  Overview     If you're thinking about losing weight, it can be hard to know where to start. Your doctor can help you set up a weight loss plan that best meets your needs. You may want to take a class on nutrition or exercise, or you could join a weight loss support group. If you have questions about how to make changes to your eating or exercise habits, ask your doctor about seeing a registered dietitian or an exercise specialist.  It can be a big challenge to lose weight. But you don't have to make huge changes at once. Make small changes, and stick with them. When those changes become habit, add a few more changes. If you don't think you're ready to make changes right now, try to pick a date in the future. Make an appointment to see your doctor to discuss whether the time is right for you to start a plan. Follow-up care is a key part of your treatment and safety.  Be sure to make and go to all appointments, and call your doctor if you are having problems. It's also a good idea to know your test results and keep a list of the medicines you take. How can you care for yourself at home? Set realistic goals. Many people expect to lose much more weight than is likely. A weight loss of 5% to 10% of your body weight may be enough to improve your health. Get family and friends involved to provide support. Talk to them about why you are trying to lose weight, and ask them to help. They can help by participating in exercise and having meals with you, even if they may be eating something different. Find what works best for you. If you do not have time or do not like to cook, a program that offers meal replacement bars or shakes may be better for you. Or if you like to prepare meals, finding a plan that includes daily menus and recipes may be best.  Ask your doctor about other health professionals who can help you achieve your weight loss goals. A dietitian can help you make healthy changes in your diet. An exercise specialist or  can help you develop a safe and effective exercise program.  A counselor or psychiatrist can help you cope with issues such as depression, anxiety, or family problems that can make it hard to focus on weight loss. Consider joining a support group for people who are trying to lose weight. Your doctor can suggest groups in your area. Where can you learn more? Go to http://www.woods.com/ and enter U357 to learn more about \"Starting a Weight Loss Plan: Care Instructions. \"  Current as of: August 25, 2022               Content Version: 13.5  © 2006-2022 Healthwise, Incorporated. Care instructions adapted under license by [facility] [employer] [facility] ([facility]).  If you have questions about a medical condition or this instruction, always ask your healthcare professional. [address] any warranty or liability for your use of this information. A Healthy Heart: Care Instructions  Your Care Instructions     Coronary artery disease, also called heart disease, occurs when a substance called plaque builds up in the vessels that supply oxygen-rich blood to your heart muscle. This can narrow the blood vessels and reduce blood flow. A heart attack happens when blood flow is completely blocked. A high-fat diet, smoking, and other factors increase the risk of heart disease. Your doctor has found that you have a chance of having heart disease. You can do lots of things to keep your heart healthy. It may not be easy, but you can change your diet, exercise more, and quit smoking. These steps really work to lower your chance of heart disease. Follow-up care is a key part of your treatment and safety. Be sure to make and go to all appointments, and call your doctor if you are having problems. It's also a good idea to know your test results and keep a list of the medicines you take. How can you care for yourself at home? Diet    Use less salt when you cook and eat. This helps lower your blood pressure. Taste food before salting. Add only a little salt when you think you need it. With time, your taste buds will adjust to less salt.     Eat fewer snack items, fast foods, canned soups, and other high-salt, high-fat, processed foods.     Read food labels and try to avoid saturated and trans fats. They increase your risk of heart disease by raising cholesterol levels.     Limit the amount of solid fat-butter, margarine, and shortening-you eat. Use olive, peanut, or canola oil when you cook. Bake, broil, and steam foods instead of frying them.     Eat a variety of fruit and vegetables every day. Dark green, deep orange, red, or yellow fruits and vegetables are especially good for you. Examples include spinach, carrots, peaches, and berries.     Foods high in fiber can reduce your cholesterol and provide important vitamins and minerals.  High-fiber foods include whole-grain cereals and breads, oatmeal, beans, brown rice, citrus fruits, and apples.     Eat lean proteins. Heart-healthy proteins include seafood, lean meats and poultry, eggs, beans, peas, nuts, seeds, and soy products.     Limit drinks and foods with added sugar. These include candy, desserts, and soda pop. Lifestyle changes    If your doctor recommends it, get more exercise. Walking is a good choice. Bit by bit, increase the amount you walk every day. Try for at least 30 minutes on most days of the week. You also may want to swim, bike, or do other activities.     Do not smoke. If you need help quitting, talk to your doctor about stop-smoking programs and medicines. These can increase your chances of quitting for good. Quitting smoking may be the most important step you can take to protect your heart. It is never too late to quit.     Limit alcohol to 2 drinks a day for men and 1 drink a day for women. Too much alcohol can cause health problems.     Manage other health problems such as diabetes, high blood pressure, and high cholesterol. If you think you may have a problem with alcohol or drug use, talk to your doctor. Medicines    Take your medicines exactly as prescribed. Call your doctor if you think you are having a problem with your medicine.     If your doctor recommends aspirin, take the amount directed each day. Make sure you take aspirin and not another kind of pain reliever, such as acetaminophen (Tylenol). When should you call for help? Call 911 if you have symptoms of a heart attack. These may include:    Chest pain or pressure, or a strange feeling in the chest.     Sweating.     Shortness of breath.     Pain, pressure, or a strange feeling in the back, neck, jaw, or upper belly or in one or both shoulders or arms.     Lightheadedness or sudden weakness.     A fast or irregular heartbeat.    After you call 911, the  may tell you to chew 1 adult-strength or 2 to 4 low-dose aspirin. Wait for an ambulance. Do not try to drive yourself. Watch closely for changes in your health, and be sure to contact your doctor if you have any problems. Where can you learn more? Go to http://www.wright.com/ and enter F075 to learn more about \"A Healthy Heart: Care Instructions. \"  Current as of: September 7, 2022               Content Version: 13.5  © 2006-2022 [employer]. Care instructions adapted under license by [facility] ([facility]). If you have questions about a medical condition or this instruction, always ask your healthcare professional. [place] [address] any warranty or liability for your use of this information. Personalized Preventive Plan for Colten Hickey - 3/6/2023  Medicare offers a range of preventive health benefits. Some of the tests and screenings are paid in full while other may be subject to a deductible, co-insurance, and/or copay. Some of these benefits include a comprehensive review of your medical history including lifestyle, illnesses that may run in your family, and various assessments and screenings as appropriate. After reviewing your medical record and screening and assessments performed today your provider may have ordered immunizations, labs, imaging, and/or referrals for you. A list of these orders (if applicable) as well as your Preventive Care list are included within your After Visit Summary for your review. Other Preventive Recommendations:    A preventive eye exam performed by an eye specialist is recommended every 1-2 years to screen for glaucoma; cataracts, macular degeneration, and other eye disorders. A preventive dental visit is recommended every 6 months. Try to get at least 150 minutes of exercise per week or 10,000 steps per day on a pedometer . Order or download the FREE \"Exercise & Physical Activity: Your Everyday Guide\" from [employer].  Call 9-927.145.6908 or search [employer] online. You need 0776-0612 mg of calcium and 4384-0280 IU of vitamin D per day. It is possible to meet your calcium requirement with diet alone, but a vitamin D supplement is usually necessary to meet this goal.  When exposed to the sun, use a sunscreen that protects against both UVA and UVB radiation with an SPF of 30 or greater. Reapply every 2 to 3 hours or after sweating, drying off with a towel, or swimming. Always wear a seat belt when traveling in a car. Always wear a helmet when riding a bicycle or motorcycle. Heart-Healthy Diet   Sodium, Fat, and Cholesterol Controlled Diet       What Is a Heart Healthy Diet? A heart-healthy diet is one that limits sodium , certain types of fat , and cholesterol . This type of diet is recommended for:   People with any form of cardiovascular disease (eg, coronary heart disease , peripheral vascular disease , previous heart attack , previous stroke )   People with risk factors for cardiovascular disease, such as high blood pressure , high cholesterol , or diabetes   Anyone who wants to lower their risk of developing cardiovascular disease   Sodium    Sodium is a mineral found in many foods. In general, most people consume much more sodium than they need. Diets high in sodium can increase blood pressure and lead to edema (water retention). On a heart-healthy diet, you should consume no more than 2,300 mg (milligrams) of sodium per dayabout the amount in one teaspoon of table salt. The foods highest in sodium include table salt (about 50% sodium), processed foods, convenience foods, and preserved foods. Cholesterol    Cholesterol is a fat-like, waxy substance in your blood. Our bodies make some cholesterol. It is also found in animal products, with the highest amounts in fatty meat, egg yolks, whole milk, cheese, shellfish, and organ meats. On a heart-healthy diet, you should limit your cholesterol intake to less than 200 mg per day.    It is normal and important to have some cholesterol in your bloodstream. But too much cholesterol can cause plaque to build up within your arteries, which can eventually lead to a heart attack or stroke. The two types of cholesterol that are most commonly referred to are:   Low-density lipoprotein (LDL) cholesterol  Also known as bad cholesterol, this is the cholesterol that tends to build up along your arteries. Bad cholesterol levels are increased by eating fats that are saturated or hydrogenated. Optimal level of this cholesterol is less than 100. Over 130 starts to get risky for heart disease. High-density lipoprotein (HDL) cholesterol  Also known as good cholesterol, this type of cholesterol actually carries cholesterol away from your arteries and may, therefore, help lower your risk of having a heart attack. You want this level to be high (ideally greater than 60). It is a risk to have a level less than 40. You can raise this good cholesterol by eating olive oil, canola oil, avocados, or nuts. Exercise raises this level, too. Fat    Fat is calorie dense and packs a lot of calories into a small amount of food. Even though fats should be limited due to their high calorie content, not all fats are bad. In fact, some fats are quite healthful. Fat can be broken down into four main types.    The good-for-you fats are:   Monounsaturated fat  found in oils such as olive and canola, avocados, and nuts and natural nut butters; can decrease cholesterol levels, while keeping levels of HDL cholesterol high   Polyunsaturated fat  found in oils such as safflower, sunflower, soybean, corn, and sesame; can decrease total cholesterol and LDL cholesterol   Omega-3 fatty acids  particularly those found in fatty fish (such as salmon, trout, tuna, mackerel, herring, and sardines); can decrease risk of arrhythmias, decrease triglyceride levels, and slightly lower blood pressure   The fats that you want to limit are:   Saturated fat  found in animal products, many fast foods, and a few vegetables; increases total blood cholesterol, including LDL levels   Animal fats that are saturated include: butter, lard, whole-milk dairy products, meat fat, and poultry skin   Vegetable fats that are saturated include: hydrogenated shortening, palm oil, coconut oil, cocoa butter   Hydrogenated or trans fat  found in margarine and vegetable shortening, most shelf stable snack foods, and fried foods; increases LDL and decreases HDL     It is generally recommended that you limit your total fat for the day to less than 30% of your total calories. If you follow an 1800-calorie heart healthy diet, for example, this would mean 60 grams of fat or less per day. Saturated fat and trans fat in your diet raises your blood cholesterol the most, much more than dietary cholesterol does. For this reason, on a heart-healthy diet, less than 7% of your calories should come from saturated fat and ideally 0% from trans fat. On an 1800-calorie diet, this translates into less than 14 grams of saturated fat per day, leaving 46 grams of fat to come from mono- and polyunsaturated fats.    Food Choices on a Heart Healthy Diet   Food Category   Foods Recommended   Foods to Avoid   Grains   Breads and rolls without salted tops Most dry and cooked cereals Unsalted crackers and breadsticks Low-sodium or homemade breadcrumbs or stuffing All rice and pastas   Breads, rolls, and crackers with salted tops High-fat baked goods (eg, muffins, donuts, pastries) Quick breads, self-rising flour, and biscuit mixes Regular bread crumbs Instant hot cereals Commercially prepared rice, pasta, or stuffing mixes   Vegetables   Most fresh, frozen, and low-sodium canned vegetables Low-sodium and salt-free vegetable juices Canned vegetables if unsalted or rinsed   Regular canned vegetables and juices, including sauerkraut and pickled vegetables Frozen vegetables with sauces Commercially prepared potato and vegetable mixes   Fruits   Most fresh, frozen, and canned fruits All fruit juices   Fruits processed with salt or sodium   Milk   Nonfat or low-fat (1%) milk Nonfat or low-fat yogurt Cottage cheese, low-fat ricotta, cheeses labeled as low-fat and low-sodium   Whole milk Reduced-fat (2%) milk Malted and chocolate milk Full fat yogurt Most cheeses (unless low-fat and low salt) Buttermilk (no more than 1 cup per week)   Meats and Beans   Lean cuts of fresh or frozen beef, veal, lamb, or pork (look for the word loin) Fresh or frozen poultry without the skin Fresh or frozen fish and some shellfish Egg whites and egg substitutes (Limit whole eggs to three per week) Tofu Nuts or seeds (unsalted, dry-roasted), low-sodium peanut butter Dried peas, beans, and lentils   Any smoked, cured, salted, or canned meat, fish, or poultry (including inman, chipped beef, cold cuts, hot dogs, sausages, sardines, and anchovies) Poultry skins Breaded and/or fried fish or meats Canned peas, beans, and lentils Salted nuts   Fats and Oils   Olive oil and canola oil Low-sodium, low-fat salad dressings and mayonnaise   Butter, margarine, coconut and palm oils, inman fat   Snacks, Sweets, and Condiments   Low-sodium or unsalted versions of broths, soups, soy sauce, and condiments Pepper, herbs, and spices; vinegar, lemon, or lime juice Low-fat frozen desserts (yogurt, sherbet, fruit bars) Sugar, cocoa powder, honey, syrup, jam, and preserves Low-fat, trans-fat free cookies, cakes, and pies Andres and animal crackers, fig bars, yesica snaps   High-fat desserts Broth, soups, gravies, and sauces, made from instant mixes or other high-sodium ingredients Salted snack foods Canned olives Meat tenderizers, seasoning salt, and most flavored vinegars   Beverages   Low-sodium carbonated beverages Tea and coffee in moderation Soy milk   Commercially softened water   Suggestions   Make whole grains, fruits, and vegetables the base of your diet.     Choose heart-healthy fats such as canola, olive, and flaxseed oil, and foods high in heart-healthy fats, such as nuts, seeds, soybeans, tofu, and fish. Eat fish at least twice per week; the fish highest in omega-3 fatty acids and lowest in mercury include salmon, herring, mackerel, sardines, and canned chunk light tuna. If you eat fish less than twice per week or have high triglycerides, talk to your doctor about taking fish oil supplements. Read food labels. For products low in fat and cholesterol, look for fat free, low-fat, cholesterol free, saturated fat free, and trans fat freeAlso scan the Nutrition Facts Label, which lists saturated fat, trans fat, and cholesterol amounts. For products low in sodium, look for sodium free, very low sodium, low sodium, no added salt, and unsalted   Skip the salt when cooking or at the table; if food needs more flavor, get creative and try out different herbs and spices. Garlic and onion also add substantial flavor to foods. Trim any visible fat off meat and poultry before cooking, and drain the fat off after evangelista. Use cooking methods that require little or no added fat, such as grilling, boiling, baking, poaching, broiling, roasting, steaming, stir-frying, and sauting. Avoid fast food and convenience food. They tend to be high in saturated and trans fat and have a lot of added salt. Talk to a registered dietitian for individualized diet advice.       Last Reviewed: March 2011 Jon Mancia MS, MPH, RD   Updated: 3/29/2011

## 2023-03-14 ENCOUNTER — TELEPHONE (OUTPATIENT)
Dept: FAMILY MEDICINE CLINIC | Age: 88
End: 2023-03-14

## 2023-03-14 DIAGNOSIS — M47.816 SPONDYLOSIS OF LUMBAR REGION WITHOUT MYELOPATHY OR RADICULOPATHY: Primary | ICD-10-CM

## 2023-03-14 NOTE — TELEPHONE ENCOUNTER
Patient would like a new referral for his on-going back pain. Dr. Unique Ferrara is preferred. Please advise.    Last seen 3/6/2023  Next appt 9/6/2023

## 2023-03-20 ENCOUNTER — OFFICE VISIT (OUTPATIENT)
Dept: PHYSICAL MEDICINE AND REHAB | Age: 88
End: 2023-03-20

## 2023-03-20 ENCOUNTER — TELEPHONE (OUTPATIENT)
Dept: PHYSICAL MEDICINE AND REHAB | Age: 88
End: 2023-03-20

## 2023-03-20 VITALS
DIASTOLIC BLOOD PRESSURE: 58 MMHG | SYSTOLIC BLOOD PRESSURE: 123 MMHG | HEART RATE: 55 BPM | BODY MASS INDEX: 31.55 KG/M2 | WEIGHT: 213 LBS | HEIGHT: 69 IN

## 2023-03-20 DIAGNOSIS — G89.29 ACUTE EXACERBATION OF CHRONIC LOW BACK PAIN: Primary | ICD-10-CM

## 2023-03-20 DIAGNOSIS — M54.50 ACUTE EXACERBATION OF CHRONIC LOW BACK PAIN: Primary | ICD-10-CM

## 2023-03-20 RX ORDER — KETOROLAC TROMETHAMINE 15 MG/ML
15 INJECTION, SOLUTION INTRAMUSCULAR; INTRAVENOUS ONCE
Status: COMPLETED | OUTPATIENT
Start: 2023-03-20 | End: 2023-03-20

## 2023-03-20 RX ORDER — TRAMADOL HYDROCHLORIDE 50 MG/1
50 TABLET ORAL EVERY 6 HOURS PRN
Qty: 28 TABLET | Refills: 0 | Status: SHIPPED | OUTPATIENT
Start: 2023-03-20 | End: 2023-03-27

## 2023-03-20 RX ADMIN — KETOROLAC TROMETHAMINE 15 MG: 15 INJECTION, SOLUTION INTRAMUSCULAR; INTRAVENOUS at 10:49

## 2023-03-20 NOTE — TELEPHONE ENCOUNTER
Called and spoke with the patient and informed him of results of xray lumbar spine. Patient is aware and voiced understanding.

## 2023-03-20 NOTE — PROGRESS NOTES
Laterality Date    ANESTHESIA NERVE BLOCK Bilateral 2019    BILATERAL L4-5 L5-S1 INTRA-ARTICULAR FACET STEROID INJECTION (CPT 94437) performed by Glen Tadeo DO at [address] Bilateral 2021    BILATERAL SACROILIAC JOINT INJECTION UNDER X-RAY GUIDANCE performed by Glen Tadeo DO at [address]  3/27/13    off-pump cabg x 3- Dr. Eren Vaughn CATH LAB PROCEDURE  3/26/13    FRACTURE SURGERY      NERVE BLOCK Bilateral 2019    lumbar intra-articular facet    NERVE BLOCK Bilateral 2021    BILATERAL MEDIAL BRANCH BLOCK AT L4-5 AND L5-S1 (CPT 47898) performed by Glen Tadeo DO at [address] Bilateral 2021    B/L medial branch blocks at L4-5, L5-S1    OTHER SURGICAL HISTORY N/A 2021    BILATERAL MEDIAL BRANCH BLOCK L4-5 AND L5-S1     OTHER SURGICAL HISTORY Right 2021    L4-5, L5-S-1 : Medial branch neurolysisi radiofrequency ablation    PAIN MANAGEMENT PROCEDURE Bilateral 2021    BILATERAL MEDIAL BRANCH BLOCKS AT L405 AND L5-S1 (CPT 76873) performed by Glen Tadeo DO at [address] [facility] Right 2021    FLUOROSCOPIC-GUIDED RIGHT LUMBAR MEDIAL BRANCH NEUROLYSIS (RADIOFREQUENCY ABLATION) AT LEVELS L4-5, L5-S1 performed by Glen Tadeo DO at [address] [facility] Left 2021    FLUOROSCOPIC-GUIDED LEFT LUMBAR MEDIAL BRANCH NEUROLYSIS (RADIOFREQUENCY ABLATION) AT LEVELS L4-5, L5-S1 (CPT 35809) performed by Glen Tadeo DO at [facility]         Family History   Problem Relation Age of Onset    Heart Disease Mother     Heart Disease Father     Heart Disease Brother        Social History     Tobacco Use    Smoking status: Former     Years: 32.00     Types: Cigarettes     Quit date: 3/26/1987     Years since quittin.0    Smokeless tobacco:

## 2023-04-04 ENCOUNTER — OFFICE VISIT (OUTPATIENT)
Dept: PHYSICAL MEDICINE AND REHAB | Age: 88
End: 2023-04-04
Payer: MEDICARE

## 2023-04-04 VITALS
WEIGHT: 212 LBS | HEART RATE: 63 BPM | HEIGHT: 69 IN | DIASTOLIC BLOOD PRESSURE: 61 MMHG | BODY MASS INDEX: 31.4 KG/M2 | SYSTOLIC BLOOD PRESSURE: 130 MMHG

## 2023-04-04 DIAGNOSIS — M54.50 ACUTE EXACERBATION OF CHRONIC LOW BACK PAIN: Primary | ICD-10-CM

## 2023-04-04 DIAGNOSIS — M47.816 LUMBAR SPONDYLOSIS: ICD-10-CM

## 2023-04-04 DIAGNOSIS — M47.819 FACET ARTHROPATHY: ICD-10-CM

## 2023-04-04 DIAGNOSIS — G89.29 ACUTE EXACERBATION OF CHRONIC LOW BACK PAIN: Primary | ICD-10-CM

## 2023-04-04 PROCEDURE — 96372 THER/PROPH/DIAG INJ SC/IM: CPT | Performed by: PHYSICAL MEDICINE & REHABILITATION

## 2023-04-04 PROCEDURE — 1123F ACP DISCUSS/DSCN MKR DOCD: CPT | Performed by: PHYSICAL MEDICINE & REHABILITATION

## 2023-04-04 PROCEDURE — 99214 OFFICE O/P EST MOD 30 MIN: CPT | Performed by: PHYSICAL MEDICINE & REHABILITATION

## 2023-04-04 RX ORDER — DEXAMETHASONE SODIUM PHOSPHATE 4 MG/ML
4 INJECTION, SOLUTION INTRA-ARTICULAR; INTRALESIONAL; INTRAMUSCULAR; INTRAVENOUS; SOFT TISSUE ONCE
Status: COMPLETED | OUTPATIENT
Start: 2023-04-04 | End: 2023-04-04

## 2023-04-04 RX ADMIN — DEXAMETHASONE SODIUM PHOSPHATE 4 MG: 4 INJECTION, SOLUTION INTRA-ARTICULAR; INTRALESIONAL; INTRAMUSCULAR; INTRAVENOUS; SOFT TISSUE at 10:13

## 2023-04-04 NOTE — PROGRESS NOTES
injections. He in interested in injections again. He responded well to lumbar RFA in past. Will obtain updated lumbar MRI and then proceed with MBB x2 with intent for RFA. Decadron 4mg IM in office today. Discussed effect on sugars. The patient was educated about the diagnosis, prognosis, indications, risks and benefits of treatment. An opportunity to ask questions was given to the patient and questions were answered. The patient agreed to proceed with the recommended treatment as described above.      Follow up - will call with MRI results and order MBB         Fuentes Coronado DO, [facility]   Board Certified Physical Medicine and Rehabilitation

## 2023-04-17 ENCOUNTER — TELEPHONE (OUTPATIENT)
Dept: PHYSICAL MEDICINE AND REHAB | Age: 88
End: 2023-04-17

## 2023-04-17 DIAGNOSIS — M47.816 LUMBAR SPONDYLOSIS: Primary | ICD-10-CM

## 2023-04-17 NOTE — TELEPHONE ENCOUNTER
Called and spoke with the patient and informed him of results of MRI and we will call him once we get approval for MBB from insurance company. Patient is aware and voiced understanding.

## 2023-04-17 NOTE — TELEPHONE ENCOUNTER
----- Message from Trace Willie, DO sent at 4/17/2023  2:10 PM EDT -----  Please call patient and let him know MRI is unchanged from prior one and we can proceed with MBB as we discussed in the office. I placed the order. Please start auth.

## 2023-04-27 ENCOUNTER — PREP FOR PROCEDURE (OUTPATIENT)
Dept: PHYSICAL MEDICINE AND REHAB | Age: 88
End: 2023-04-27

## 2023-04-27 ENCOUNTER — TELEPHONE (OUTPATIENT)
Dept: PHYSICAL MEDICINE AND REHAB | Age: 88
End: 2023-04-27

## 2023-04-27 NOTE — TELEPHONE ENCOUNTER
Tried to contact the patient to schedule him for [facility] but phone just kept ring no answering machine came on. Will try to call again later.

## 2023-04-27 NOTE — TELEPHONE ENCOUNTER
Patient called back and he was scheduled for 5-4-23 for bilateral MMB #1. Patient was informed that someone will call him on 5-3-23 to inform him of the time he will need to be at the surgery center for his procedure and he was also informed to hold aspirin 81 mg 24 hrs before procedure. Patient is aware and voiced understanding.

## 2023-04-28 ENCOUNTER — HOSPITAL ENCOUNTER (EMERGENCY)
Age: 88
Discharge: HOME | End: 2023-04-28
Payer: MEDICARE

## 2023-04-28 VITALS
RESPIRATION RATE: 20 BRPM | TEMPERATURE: 98 F | OXYGEN SATURATION: 98 % | HEART RATE: 57 BPM | WEIGHT: 209 LBS | DIASTOLIC BLOOD PRESSURE: 76 MMHG | SYSTOLIC BLOOD PRESSURE: 129 MMHG | BODY MASS INDEX: 30.86 KG/M2

## 2023-04-28 DIAGNOSIS — S60.455A FOREIGN BODY OF LEFT RING FINGER: Primary | ICD-10-CM

## 2023-04-28 PROCEDURE — 99211 OFF/OP EST MAY X REQ PHY/QHP: CPT

## 2023-04-28 NOTE — ED PROVIDER NOTES
[facility]  EMERGENCY DEPARTMENT ENCOUNTER        NAME: Cheikh Blanco  :  1935  MRN:  [id number]  Date of evaluation: 2023  Provider: Beny Almanza PA-C  PCP: Consuelo Gonzalez MD  Note Started : 1:02 PM EDT 23    Chief Complaint: Ring Removal (Having surgery band on left ring finger)      This is a 81-year-old male that presents to urgent care stating that he has to have his wedding ring off of his left ring finger soon because he is going to have a procedure done. Patient has tried multiple ways to have this taken off but he is unable to do so. Patient agrees to have urgent care cut the ring to remove it. On first contact patient he appears to be in no acute distress. Review of Systems  Pertinent positives and negatives are stated within HPI, all other systems reviewed and are negative. Allergies: Patient has no known allergies. --------------------------------------------- PAST HISTORY ---------------------------------------------  Past Medical History:  has a past medical history of Acquired hypothyroidism, Bundle branch block, CAD (coronary artery disease), Diabetes mellitus ([facility] [address] Hyperlipidemia LDL goal < 100, and Spondylosis of lumbar region without myelopathy or radiculopathy. 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     Social History:  reports that he quit smoking

## 2023-05-02 ENCOUNTER — OFFICE VISIT (OUTPATIENT)
Dept: FAMILY MEDICINE CLINIC | Age: 88
End: 2023-05-02
Payer: MEDICARE

## 2023-05-02 ENCOUNTER — TELEPHONE (OUTPATIENT)
Dept: FAMILY MEDICINE CLINIC | Age: 88
End: 2023-05-02

## 2023-05-02 VITALS
HEART RATE: 76 BPM | BODY MASS INDEX: 30.96 KG/M2 | SYSTOLIC BLOOD PRESSURE: 127 MMHG | TEMPERATURE: 97.8 F | HEIGHT: 69 IN | WEIGHT: 209 LBS | RESPIRATION RATE: 17 BRPM | OXYGEN SATURATION: 95 % | DIASTOLIC BLOOD PRESSURE: 70 MMHG

## 2023-05-02 DIAGNOSIS — U07.1 COVID: Primary | ICD-10-CM

## 2023-05-02 LAB
INFLUENZA A ANTIGEN, POC: NEGATIVE
INFLUENZA B ANTIGEN, POC: NEGATIVE
LOT EXPIRE DATE: ABNORMAL
LOT KIT NUMBER: ABNORMAL
SARS-COV-2, POC: DETECTED
VALID INTERNAL CONTROL: ABNORMAL
VENDOR AND KIT NAME POC: ABNORMAL

## 2023-05-02 PROCEDURE — 99213 OFFICE O/P EST LOW 20 MIN: CPT

## 2023-05-02 PROCEDURE — 87428 SARSCOV & INF VIR A&B AG IA: CPT

## 2023-05-02 PROCEDURE — 1123F ACP DISCUSS/DSCN MKR DOCD: CPT

## 2023-05-02 RX ORDER — SODIUM CHLORIDE 9 MG/ML
INJECTION, SOLUTION INTRAVENOUS PRN
Status: CANCELLED | OUTPATIENT
Start: 2023-05-02

## 2023-05-02 RX ORDER — SODIUM CHLORIDE 0.9 % (FLUSH) 0.9 %
5-40 SYRINGE (ML) INJECTION PRN
Status: CANCELLED | OUTPATIENT
Start: 2023-05-02

## 2023-05-02 RX ORDER — SODIUM CHLORIDE 0.9 % (FLUSH) 0.9 %
5-40 SYRINGE (ML) INJECTION EVERY 12 HOURS SCHEDULED
Status: CANCELLED | OUTPATIENT
Start: 2023-05-02

## 2023-05-02 RX ORDER — DOXYCYCLINE HYCLATE 100 MG/1
100 CAPSULE ORAL 2 TIMES DAILY
Qty: 20 CAPSULE | Refills: 0 | Status: SHIPPED | OUTPATIENT
Start: 2023-05-02 | End: 2023-05-12

## 2023-05-02 RX ORDER — PREDNISONE 10 MG/1
10 TABLET ORAL DAILY
Qty: 7 TABLET | Refills: 0 | Status: SHIPPED | OUTPATIENT
Start: 2023-05-02 | End: 2023-05-09

## 2023-05-02 NOTE — PROGRESS NOTES
Oriented. Motor functions intact. Lab / Imaging Results   (All laboratory and radiology results have been personally reviewed by myself)  Labs:  No results found for this visit on 05/02/23. Imaging: All Radiology results interpreted by Radiologist unless otherwise noted. Assessment / Plan     Impression(s):  Edelmira Goldberg was seen today for cough. Diagnoses and all orders for this visit:    COVID    Other orders  -     doxycycline hyclate (VIBRAMYCIN) 100 MG capsule; Take 1 capsule by mouth 2 times daily for 10 days  -     predniSONE (DELTASONE) 10 MG tablet; Take 1 tablet by mouth daily for 7 days      Disposition:  Disposition: Discharge to Home. Rapid COVID-19 testing is positive in office. Pt should remain out of public for at least 5 days from the start of symptoms. Additional 5 days out in public fully masked. Pt should also be fever free for 24 hours and symptoms should be improved overall prior to returning. Increase fluids and rest. Symptomatic relief discussed including Tylenol prn pain/fever. Schedule f/u with PCP in 7-10 days if symptoms persist. ED sooner if symptoms worsen or change. ED immediately with high or refractory fever, progressive SOB, dyspnea, CP, calf pain/swelling, shaking chills, vomiting, abdominal pain, lethargy, flank pain, or decreased urinary output. Pt verbalizes understanding and is in agreement with plan of care. All questions answered. JYOTHI Sprague - CNP    **This report was transcribed using voice recognition software. Every effort was made to ensure accuracy; however, inadvertent computerized transcription errors may be present.
myself)  Labs:  No results found for this visit on 05/02/23. Imaging: All Radiology results interpreted by Radiologist unless otherwise noted. No orders to display       Assessment / Plan:   The patient's vitals, allergies, medications, and past medical history have been reviewed. There are no diagnoses linked to this encounter.    - Disposition: ***    - Educational material printed for patient's review and were included in patient instructions. After Visit Summary was given to patient at the end of visit. - COVID-19 swab obtained and pending, will call with results once available. Advised to follow CDC guidelines. Encouraged oral fluids and rest. Discussed symptomatic treatments with patient today. The patient is to schedule a follow-up with PCP in the next 2-3 days for reevaluation. Red flag symptoms were also discussed with the patient today. If symptoms worsen the patient is to go directly to the emergency department for reevaluation and treatment. Pt verbalizes understanding and is in agreement with plan of care. All questions answered. SIGNATURE: JYOTHI Singh - CNP      *NOTE: This report was transcribed using voice recognition software. Every effort was made to ensure accuracy; however, inadvertent computerized transcription errors may be present.

## 2023-05-02 NOTE — TELEPHONE ENCOUNTER
Patient's wife called to schedule an appt today for patient who has a rattling in his chest and cough which came on yesterday. I informed that Dr. Luz Marina Campbell does not have an appt today and advised that he go to the [address]. Patient's wife was agreeable and stated she'll bring him in.      Last seen 3/6/2023  Next appt 9/6/2023

## 2023-05-03 ENCOUNTER — TELEPHONE (OUTPATIENT)
Dept: PHYSICAL MEDICINE AND REHAB | Age: 88
End: 2023-05-03

## 2023-05-16 NOTE — TELEPHONE ENCOUNTER
Patient called in and is ready for his MBB at the surgery center, his authorization is , will start a new one to get another approval. m

## 2023-05-18 ENCOUNTER — PREP FOR PROCEDURE (OUTPATIENT)
Dept: PHYSICAL MEDICINE AND REHAB | Age: 88
End: 2023-05-18

## 2023-05-18 ENCOUNTER — TELEPHONE (OUTPATIENT)
Dept: PHYSICAL MEDICINE AND REHAB | Age: 88
End: 2023-05-18

## 2023-05-23 RX ORDER — SODIUM CHLORIDE 0.9 % (FLUSH) 0.9 %
5-40 SYRINGE (ML) INJECTION PRN
Status: CANCELLED | OUTPATIENT
Start: 2023-05-23

## 2023-05-23 RX ORDER — SODIUM CHLORIDE 9 MG/ML
INJECTION, SOLUTION INTRAVENOUS PRN
Status: CANCELLED | OUTPATIENT
Start: 2023-05-23

## 2023-05-23 RX ORDER — SODIUM CHLORIDE 0.9 % (FLUSH) 0.9 %
5-40 SYRINGE (ML) INJECTION EVERY 12 HOURS SCHEDULED
Status: CANCELLED | OUTPATIENT
Start: 2023-05-23

## 2023-05-25 ENCOUNTER — HOSPITAL ENCOUNTER (OUTPATIENT)
Dept: OPERATING ROOM | Age: 88
Setting detail: OUTPATIENT SURGERY
Discharge: HOME | End: 2023-05-25
Attending: PHYSICAL MEDICINE & REHABILITATION
Payer: MEDICARE

## 2023-05-25 ENCOUNTER — HOSPITAL ENCOUNTER (OUTPATIENT)
Age: 88
Setting detail: OUTPATIENT SURGERY
Discharge: HOME | End: 2023-05-25
Attending: PHYSICAL MEDICINE & REHABILITATION | Admitting: PHYSICAL MEDICINE & REHABILITATION
Payer: MEDICARE

## 2023-05-25 VITALS
HEIGHT: 69 IN | SYSTOLIC BLOOD PRESSURE: 115 MMHG | HEART RATE: 50 BPM | RESPIRATION RATE: 14 BRPM | DIASTOLIC BLOOD PRESSURE: 68 MMHG | BODY MASS INDEX: 30.96 KG/M2 | WEIGHT: 209 LBS | OXYGEN SATURATION: 96 %

## 2023-05-25 DIAGNOSIS — M47.896 OTHER OSTEOARTHRITIS OF SPINE, LUMBAR REGION: ICD-10-CM

## 2023-05-25 PROCEDURE — 3600000005 HC SURGERY LEVEL 5 BASE: Performed by: PHYSICAL MEDICINE & REHABILITATION

## 2023-05-25 PROCEDURE — 2709999900 HC NON-CHARGEABLE SUPPLY: Performed by: PHYSICAL MEDICINE & REHABILITATION

## 2023-05-25 PROCEDURE — 7100000010 HC PHASE II RECOVERY - FIRST 15 MIN: Performed by: PHYSICAL MEDICINE & REHABILITATION

## 2023-05-25 PROCEDURE — 7100000011 HC PHASE II RECOVERY - ADDTL 15 MIN: Performed by: PHYSICAL MEDICINE & REHABILITATION

## 2023-05-25 PROCEDURE — 3209999900 FLUORO FOR SURGICAL PROCEDURES

## 2023-05-25 PROCEDURE — 2500000003 HC RX 250 WO HCPCS: Performed by: PHYSICAL MEDICINE & REHABILITATION

## 2023-05-25 RX ORDER — SODIUM CHLORIDE 0.9 % (FLUSH) 0.9 %
5-40 SYRINGE (ML) INJECTION PRN
Status: DISCONTINUED | OUTPATIENT
Start: 2023-05-25 | End: 2023-05-25 | Stop reason: HOSPADM

## 2023-05-25 RX ORDER — LIDOCAINE HYDROCHLORIDE 10 MG/ML
INJECTION, SOLUTION EPIDURAL; INFILTRATION; INTRACAUDAL; PERINEURAL PRN
Status: DISCONTINUED | OUTPATIENT
Start: 2023-05-25 | End: 2023-05-25 | Stop reason: ALTCHOICE

## 2023-05-25 RX ORDER — SODIUM CHLORIDE 9 MG/ML
INJECTION, SOLUTION INTRAVENOUS PRN
Status: DISCONTINUED | OUTPATIENT
Start: 2023-05-25 | End: 2023-05-25 | Stop reason: HOSPADM

## 2023-05-25 RX ORDER — SODIUM CHLORIDE 0.9 % (FLUSH) 0.9 %
5-40 SYRINGE (ML) INJECTION EVERY 12 HOURS SCHEDULED
Status: DISCONTINUED | OUTPATIENT
Start: 2023-05-25 | End: 2023-05-25 | Stop reason: HOSPADM

## 2023-05-25 ASSESSMENT — PAIN - FUNCTIONAL ASSESSMENT: PAIN_FUNCTIONAL_ASSESSMENT: 0-10

## 2023-05-25 ASSESSMENT — PAIN DESCRIPTION - DESCRIPTORS: DESCRIPTORS: ACHING

## 2023-05-25 NOTE — DISCHARGE INSTRUCTIONS
Post-op instruction Block  Dr. Delia Giron  [phone]      Rest 12-24 hours following procedure. DO NOT DRIVE till the following day. Keep dressing clean and dry. Do not bathe, shower, or sit in hot tub the day of procedure . You may remove the dressing following A.M. You may return to work/school tomorrow. Drink extra fluids for the next 24 hours. Resume your regular diet. Resume previously prescribed medications. Resume Coumadin, Plavix, NSAIDS, Ibuprofen products 24 hours after procedure. You need to have a responsible adult stay with you this evening. If you experience any discomfort relating to this procedure, you may take Tylenol 2 tablets every 4 hrs as needed for pain and/or apply ice to the affected area. Please follow up with Dr. Delia Giron or Dr. Linsey Cruz. If you were a hip injection follow up with your orthopedic Doctor. If you experience any unusual symptoms or problems, call your physicians answering service at [phone] or go directly to [facility] [place]. [facility] - JULIA ROWAN [facility] Emergency Department.

## 2023-05-25 NOTE — OP NOTE
Renetta Mcclendon    5/25/2023     CHIEF COMPLAINT:  Low back pain. PRE-OPERATIVE DIAGNOSIS:  Lumbar spondylosis, lumbar facet syndrome, lumbosacral osteoarthritis     POST-OPERATIVE DIAGNOSIS:  Lumbar spondylosis, lumbar facet syndrome, lumbosacral osteoarthritis     PROCEDURE:  # 1 Fluoroscopic guided lumbar medial branch blocks at  levels: medial branch level: L3, L4, L5 Joint: Bilateral  L4-5, L5-S1. SURGEON:    Giana Blair DO    ANESTHESIA:   Local.    ESTIMATED BLOOD LOSS:  None.  ______________________________________________________________________  HISTORY & PHYSICAL: See separate H&P. PROCEDURE:  After confirming written and informed consent, Renetta Mcclendon was brought to the procedure room and placed in the prone position. Blood pressure, heart rate, O2 saturation, and visual and verbal monitoring were established. A time-out was performed and Hernandos name, date of birth, the procedure to be performed, as well as the plan for the location of the needle insertion were confirmed. Fluoroscopy was utilized to delineate the anatomy of the lumbosacral spine. Surface landmarks were identified as well. After prep and drape, an oblique fluoroscopic view was created to optimize visualization of the junction of the transverse process and the pedicle. After infiltration of local, a #22-gauge, 3.5-inch regional block needle was passed to position the tip at the junction of the superior articular process and the transverse process, along the course of the medial branch. Satisfactory needle placement was confirmed by AP and oblique projections. At the sacral alar level, the sacral alar region was visualized and the needle tip was positioned on the sacral alar at the base of the superior articulating process where the medial branch traverses. At each level the patient received 0.75 cc of 0.25% Marcaine.     The above procedure was performed at each of the following levels:

## 2023-05-25 NOTE — H&P
Giana Blair, [address] [facility] Physical Medicine and Rehabilitation  [address] [place] [address] Rd. [facility] [facility] Brain  Phone: [phone]  Fax: [phone]    PCP: Prashant Edge MD  Date of visit: 5/25/2023    CC: low back pain       Renetta Mcclendon is a 81 y.o. male who presents today for medial branch blocks. Patient complains of low back pain. No red flag symptoms. Consents to proceed with procedure.      No Known Allergies    Current Facility-Administered Medications   Medication Dose Route Frequency Provider Last Rate Last Admin    sodium chloride flush 0.9 % injection 5-40 mL  5-40 mL IntraVENous 2 times per day Gladys Christine, DO        sodium chloride flush 0.9 % injection 5-40 mL  5-40 mL IntraVENous PRN Gladys Christine, DO        0.9 % sodium chloride infusion   IntraVENous PRN Gladys Christine, DO           Past Medical History:   Diagnosis Date    Acquired hypothyroidism 9/5/2021    Bundle branch block     CAD (coronary artery disease)     Diabetes mellitus ([facility] [address]     Hyperlipidemia LDL goal < 100 8/24/2015    Spondylosis of lumbar region without myelopathy or radiculopathy 8/28/2018       Past Surgical History:   Procedure Laterality Date    ANESTHESIA NERVE BLOCK Bilateral 2/28/2019    BILATERAL L4-5 L5-S1 INTRA-ARTICULAR FACET STEROID INJECTION (CPT 65849) performed by Giana Blair DO at [address] [facility] [facility] [address] [facility] Bilateral 9/30/2021    BILATERAL SACROILIAC JOINT INJECTION UNDER X-RAY GUIDANCE performed by Giana Blair DO at [address] [facility] [facility]  3/27/13    off-pump cabg x 3- Dr. Zechariah Hairston CATH LAB PROCEDURE  3/26/13    FRACTURE SURGERY      NERVE BLOCK Bilateral 02/28/2019    lumbar intra-articular facet    NERVE BLOCK Bilateral 4/29/2021    BILATERAL MEDIAL BRANCH BLOCK AT L4-5 AND L5-S1 (CPT 62376) performed by Giana Blair DO at [address] [id number]

## 2023-05-31 ENCOUNTER — TELEPHONE (OUTPATIENT)
Dept: PHYSICAL MEDICINE AND REHAB | Age: 88
End: 2023-05-31

## 2023-05-31 NOTE — TELEPHONE ENCOUNTER
Called and spoke with the patient and informed him that that will be the soonest appointment after his MBB. Patient is aware and voiced understanding.

## 2023-05-31 NOTE — TELEPHONE ENCOUNTER
Pt called to see if he can be seen sooner than 06/19 at 9am with Dr. Annika Walters. He had his Medial branch block on 05/25 and appt is 06/19 for follow up. He states his pain level from a 1-10 is a 9. He would like to speak to the office. Please contact.

## 2023-06-19 ENCOUNTER — OFFICE VISIT (OUTPATIENT)
Dept: PHYSICAL MEDICINE AND REHAB | Age: 88
End: 2023-06-19
Payer: MEDICARE

## 2023-06-19 VITALS
SYSTOLIC BLOOD PRESSURE: 122 MMHG | HEART RATE: 77 BPM | BODY MASS INDEX: 31.4 KG/M2 | HEIGHT: 69 IN | DIASTOLIC BLOOD PRESSURE: 71 MMHG | WEIGHT: 212 LBS

## 2023-06-19 DIAGNOSIS — M54.50 CHRONIC BILATERAL LOW BACK PAIN WITHOUT SCIATICA: ICD-10-CM

## 2023-06-19 DIAGNOSIS — M47.816 LUMBAR SPONDYLOSIS: Primary | ICD-10-CM

## 2023-06-19 DIAGNOSIS — G89.29 CHRONIC BILATERAL LOW BACK PAIN WITHOUT SCIATICA: ICD-10-CM

## 2023-06-19 DIAGNOSIS — R26.9 IMPAIRED GAIT: ICD-10-CM

## 2023-06-19 DIAGNOSIS — M47.819 FACET ARTHROPATHY: ICD-10-CM

## 2023-06-19 PROCEDURE — 1123F ACP DISCUSS/DSCN MKR DOCD: CPT | Performed by: PHYSICAL MEDICINE & REHABILITATION

## 2023-06-19 PROCEDURE — 64493 INJ PARAVERT F JNT L/S 1 LEV: CPT | Performed by: PHYSICAL MEDICINE & REHABILITATION

## 2023-06-19 PROCEDURE — 99214 OFFICE O/P EST MOD 30 MIN: CPT | Performed by: PHYSICAL MEDICINE & REHABILITATION

## 2023-06-19 RX ORDER — TRAMADOL HYDROCHLORIDE 50 MG/1
50 TABLET ORAL EVERY 6 HOURS PRN
Qty: 28 TABLET | Refills: 0 | Status: SHIPPED | OUTPATIENT
Start: 2023-06-19 | End: 2023-06-26

## 2023-06-19 RX ORDER — SODIUM CHLORIDE 9 MG/ML
INJECTION, SOLUTION INTRAVENOUS PRN
Status: CANCELLED | OUTPATIENT
Start: 2023-06-19

## 2023-06-19 RX ORDER — SODIUM CHLORIDE 0.9 % (FLUSH) 0.9 %
5-40 SYRINGE (ML) INJECTION PRN
Status: CANCELLED | OUTPATIENT
Start: 2023-06-19

## 2023-06-19 RX ORDER — SODIUM CHLORIDE 0.9 % (FLUSH) 0.9 %
5-40 SYRINGE (ML) INJECTION EVERY 12 HOURS SCHEDULED
Status: CANCELLED | OUTPATIENT
Start: 2023-06-19

## 2023-06-19 NOTE — H&P
Bandar Maher, [address] [facility] Physical Medicine and Rehabilitation  [address] [facility]  Phone: [phone]  Fax: [phone]    PCP: José Manuel Myrick MD  Date of visit: 6/19/23    Chief Complaint   Patient presents with    Back Pain     F/U medial branch block #1       Interval:   Patient presents today for follow up visit after undergoing bilateral MBB L4-5 and L5-S1 #1. He had 100% improvement the day of the procedure then the pain returned the following the day. The pain is rated Pain Score:   7, is described as achy, burning, and is located in the low back without radiation to the legs. The pain is better with rest.  The pain is worse with standing and walking. There is no associated numbness/tingling. There is no weakness. There is no bowel/bladder changes. Pain is affecting his function. The prior workup has included: Xray, MRI     The prior treatment has included:  PT: in past  Chiropractic: none    Modalities: none   OTC Tylenol: yes  NSAIDS: CAD. Toradol IM  Opioids: tramadol    Membrane stabilizers: neurontin   Muscle relaxers: none    Previous injections: bilateral L4-5 and L5-S1 facet injections- 90% relief   Bilateral MBB L4-5 and L5-S1- 100% relief twice  Bilateral L4-5 and L5-S1 RFA  Bilateral SI  Bilateral L4-5, L5-S1 MBB   Previous surgery at this site: none    No Known Allergies    Current Outpatient Medications   Medication Sig Dispense Refill    traMADol (ULTRAM) 50 MG tablet Take 1 tablet by mouth every 6 hours as needed for Pain for up to 7 days. Intended supply: 7 days. Take lowest dose possible to manage pain Max Daily Amount: 200 mg 28 tablet 0    levothyroxine (SYNTHROID) 50 MCG tablet TAKE ONE TABLET BY MOUTH DAILY 90 tablet 1    gabapentin (NEURONTIN) 100 MG capsule Take 1 capsule by mouth 2 times daily.  180 capsule 1    tamsulosin (FLOMAX) 0.4 MG capsule Take 1 capsule by mouth daily 90 capsule 1    finasteride (PROSCAR) 5 MG

## 2023-06-19 NOTE — PROGRESS NOTES
Isra Mcqueen, [address] [facility] Physical Medicine and Rehabilitation  [address] [facility] Brain  Phone: [phone]  Fax: [phone]    PCP: Sánchez Obando MD  Date of visit: 6/19/23    Chief Complaint   Patient presents with    Back Pain     F/U medial branch block #1       Interval:   Patient presents today for follow up visit after undergoing bilateral MBB L4-5 and L5-S1 #1. He had 100% improvement the day of the procedure then the pain returned the following the day. The pain is rated Pain Score:   7, is described as achy, burning, and is located in the low back without radiation to the legs. The pain is better with rest.  The pain is worse with standing and walking. There is no associated numbness/tingling. There is no weakness. There is no bowel/bladder changes. Pain is affecting his function. The prior workup has included: Xray, MRI     The prior treatment has included:  PT: in past  Chiropractic: none    Modalities: none   OTC Tylenol: yes  NSAIDS: CAD. Toradol IM  Opioids: tramadol    Membrane stabilizers: neurontin   Muscle relaxers: none    Previous injections: bilateral L4-5 and L5-S1 facet injections- 90% relief   Bilateral MBB L4-5 and L5-S1- 100% relief twice  Bilateral L4-5 and L5-S1 RFA  Bilateral SI  Bilateral L4-5, L5-S1 MBB   Previous surgery at this site: none    No Known Allergies    Current Outpatient Medications   Medication Sig Dispense Refill    traMADol (ULTRAM) 50 MG tablet Take 1 tablet by mouth every 6 hours as needed for Pain for up to 7 days. Intended supply: 7 days. Take lowest dose possible to manage pain Max Daily Amount: 200 mg 28 tablet 0    levothyroxine (SYNTHROID) 50 MCG tablet TAKE ONE TABLET BY MOUTH DAILY 90 tablet 1    gabapentin (NEURONTIN) 100 MG capsule Take 1 capsule by mouth 2 times daily.  180 capsule 1    tamsulosin (FLOMAX) 0.4 MG capsule Take 1 capsule by mouth daily 90 capsule 1    finasteride (PROSCAR) 5 MG

## 2023-06-20 ENCOUNTER — PREP FOR PROCEDURE (OUTPATIENT)
Dept: PHYSICAL MEDICINE AND REHAB | Age: 88
End: 2023-06-20

## 2023-06-22 ENCOUNTER — HOSPITAL ENCOUNTER (OUTPATIENT)
Dept: OPERATING ROOM | Age: 88
Setting detail: OUTPATIENT SURGERY
Discharge: HOME | End: 2023-06-22
Attending: PHYSICAL MEDICINE & REHABILITATION
Payer: MEDICARE

## 2023-06-22 ENCOUNTER — HOSPITAL ENCOUNTER (OUTPATIENT)
Age: 88
Setting detail: OUTPATIENT SURGERY
Discharge: HOME | End: 2023-06-22
Attending: PHYSICAL MEDICINE & REHABILITATION | Admitting: PHYSICAL MEDICINE & REHABILITATION
Payer: MEDICARE

## 2023-06-22 VITALS
DIASTOLIC BLOOD PRESSURE: 76 MMHG | BODY MASS INDEX: 31.4 KG/M2 | OXYGEN SATURATION: 95 % | HEART RATE: 58 BPM | RESPIRATION RATE: 16 BRPM | SYSTOLIC BLOOD PRESSURE: 118 MMHG | HEIGHT: 69 IN | WEIGHT: 212 LBS | TEMPERATURE: 97 F

## 2023-06-22 DIAGNOSIS — M47.896 OTHER OSTEOARTHRITIS OF SPINE, LUMBAR REGION: ICD-10-CM

## 2023-06-22 PROCEDURE — 3209999900 FLUORO FOR SURGICAL PROCEDURES

## 2023-06-22 PROCEDURE — 2500000003 HC RX 250 WO HCPCS: Performed by: PHYSICAL MEDICINE & REHABILITATION

## 2023-06-22 PROCEDURE — 2709999900 HC NON-CHARGEABLE SUPPLY: Performed by: PHYSICAL MEDICINE & REHABILITATION

## 2023-06-22 PROCEDURE — 7100000010 HC PHASE II RECOVERY - FIRST 15 MIN: Performed by: PHYSICAL MEDICINE & REHABILITATION

## 2023-06-22 PROCEDURE — 3600000005 HC SURGERY LEVEL 5 BASE: Performed by: PHYSICAL MEDICINE & REHABILITATION

## 2023-06-22 PROCEDURE — 7100000011 HC PHASE II RECOVERY - ADDTL 15 MIN: Performed by: PHYSICAL MEDICINE & REHABILITATION

## 2023-06-22 RX ORDER — SODIUM CHLORIDE 9 MG/ML
INJECTION, SOLUTION INTRAVENOUS PRN
Status: DISCONTINUED | OUTPATIENT
Start: 2023-06-22 | End: 2023-06-22 | Stop reason: HOSPADM

## 2023-06-22 RX ORDER — SODIUM CHLORIDE 0.9 % (FLUSH) 0.9 %
5-40 SYRINGE (ML) INJECTION PRN
Status: DISCONTINUED | OUTPATIENT
Start: 2023-06-22 | End: 2023-06-22 | Stop reason: HOSPADM

## 2023-06-22 RX ORDER — SODIUM CHLORIDE 0.9 % (FLUSH) 0.9 %
5-40 SYRINGE (ML) INJECTION EVERY 12 HOURS SCHEDULED
Status: DISCONTINUED | OUTPATIENT
Start: 2023-06-22 | End: 2023-06-22 | Stop reason: HOSPADM

## 2023-06-22 RX ORDER — LIDOCAINE HYDROCHLORIDE 10 MG/ML
INJECTION, SOLUTION EPIDURAL; INFILTRATION; INTRACAUDAL; PERINEURAL PRN
Status: DISCONTINUED | OUTPATIENT
Start: 2023-06-22 | End: 2023-06-22 | Stop reason: ALTCHOICE

## 2023-06-22 ASSESSMENT — PAIN - FUNCTIONAL ASSESSMENT: PAIN_FUNCTIONAL_ASSESSMENT: 0-10

## 2023-06-22 ASSESSMENT — PAIN DESCRIPTION - DESCRIPTORS: DESCRIPTORS: ACHING;BURNING;NAGGING

## 2023-06-22 NOTE — DISCHARGE INSTRUCTIONS
Post-op instruction Block  Dr. Carlota Hammonds  [phone]      Rest 12-24 hours following procedure. DO NOT DRIVE till the following day. Keep dressing clean and dry. Do not bathe, shower, or sit in hot tub the day of procedure . You may remove the dressing following A.M. You may return to work/school tomorrow. Drink extra fluids for the next 24 hours. Resume your regular diet. Resume previously prescribed medications. Resume Coumadin, Plavix, NSAIDS, Ibuprofen products 24 hours after procedure. You need to have a responsible adult stay with you this evening. If you experience any discomfort relating to this procedure, you may take Tylenol 2 tablets every 4 hrs as needed for pain and/or apply ice to the affected area. Please follow up with Dr. Carlota Hammonds or Dr. Kim Mata. If you were a hip injection follow up with your orthopedic Doctor. If you experience any unusual symptoms or problems, call your physicians answering service at [phone] or go directly to [facility] [place]. [facility] Emergency Department. Infection After Surgery: Care Instructions  Overview  After surgery, an infection is always possible. It doesn't mean that the surgery didn't go well. Because an infection can be serious, your doctor has taken steps to manage it. Your doctor checked the infection and cleaned it if necessary. Your doctor may have made an opening in the area so that the pus can drain out. You may have gauze in the cut so that the area will stay open and keep draining. You may need antibiotics. You will need to follow up with your doctor to make sure the infection has gone away. Follow-up care is a key part of your treatment and safety. Be sure to make and go to all appointments, and call your doctor if you are having problems. It's also a good idea to know your test results and keep a list of the medicines you take. How can you care for yourself at home?   Make sure your

## 2023-06-22 NOTE — H&P
5  Knee Ext                     5          5  Ankle dorsi                  5          5  EHL                             5          5  Ankle Plantar               5          5     Sensory:  Intact for light touch in all lower extremity dermatomes. Reflexes:                     R          L  Patellar                        (1+)      (1+)  Ankle Jerk                   (0)        (0)        Gait is forward flexed at hips. Imaging:   X-ray L Spine   MRI L spine      Impression:   Tristen Hairston is a 81 y.o. male      1. Lumbar spondylosis    2. Facet arthropathy    3. Chronic bilateral low back pain without sciatica    4. Impaired gait             Plan:   Positive diagnostic MBB #1. Will proceed with #2 with intent for RFA. Tramadol 50mg q6 PRN x 7 days provided in the meantime. Controlled Substance Monitoring:     Acute and Chronic Pain Monitoring:   RX Monitoring 6/19/2023   Periodic Controlled Substance Monitoring No signs of potential drug abuse or diversion identified. The patient was educated about the diagnosis, prognosis, indications, risks and benefits of treatment. An opportunity to ask questions was given to the patient and questions were answered. The patient agreed to proceed with the recommended treatment as described above.       Follow up - he will call after MBB            Starr Dudley DO, FAAPMR

## 2023-06-22 NOTE — OP NOTE
Ree Ahuja    6/22/2023     CHIEF COMPLAINT:  Low back pain. PRE-OPERATIVE DIAGNOSIS:  Lumbar spondylosis, lumbar facet syndrome, lumbosacral osteoarthritis     POST-OPERATIVE DIAGNOSIS:  Lumbar spondylosis, lumbar facet syndrome, lumbosacral osteoarthritis     PROCEDURE:  # 2 Fluoroscopic guided lumbar medial branch blocks at  levels: medial branch level: L3, L4, L5 Joint: Bilateral L4-5, L5-S1. SURGEON:    Sara Headley,     ANESTHESIA:   Local.    ESTIMATED BLOOD LOSS:  None.  ______________________________________________________________________  HISTORY & PHYSICAL: See separate H&P. PROCEDURE:  After confirming written and informed consent, Ree Ahuja was brought to the procedure room and placed in the prone position. Blood pressure, heart rate, O2 saturation, and visual and verbal monitoring were established. A time-out was performed and Hernandos name, date of birth, the procedure to be performed, as well as the plan for the location of the needle insertion were confirmed. Fluoroscopy was utilized to delineate the anatomy of the lumbosacral spine. Surface landmarks were identified as well. After prep and drape, an oblique fluoroscopic view was created to optimize visualization of the junction of the transverse process and the pedicle. After infiltration of local, a #22-gauge, 3.5-inch regional block needle was passed to position the tip at the junction of the superior articular process and the transverse process, along the course of the medial branch. Satisfactory needle placement was confirmed by AP and oblique projections. At the sacral alar level, the sacral alar region was visualized and the needle tip was positioned on the sacral alar at the base of the superior articulating process where the medial branch traverses. At each level the patient received 0.75 cc of 0.25% Marcaine.     The above procedure was performed at each of the following levels:

## 2023-06-27 LAB — DIABETIC RETINOPATHY: NEGATIVE

## 2023-07-17 ENCOUNTER — OFFICE VISIT (OUTPATIENT)
Dept: PHYSICAL MEDICINE AND REHAB | Age: 88
End: 2023-07-17
Payer: MEDICARE

## 2023-07-17 VITALS
HEART RATE: 58 BPM | TEMPERATURE: 98.4 F | OXYGEN SATURATION: 96 % | SYSTOLIC BLOOD PRESSURE: 119 MMHG | HEIGHT: 69 IN | DIASTOLIC BLOOD PRESSURE: 73 MMHG | BODY MASS INDEX: 31.01 KG/M2 | RESPIRATION RATE: 18 BRPM | WEIGHT: 209.4 LBS

## 2023-07-17 DIAGNOSIS — T50.905A ADVERSE EFFECT OF DRUG, INITIAL ENCOUNTER: ICD-10-CM

## 2023-07-17 DIAGNOSIS — M54.50 CHRONIC BILATERAL LOW BACK PAIN WITHOUT SCIATICA: ICD-10-CM

## 2023-07-17 DIAGNOSIS — M47.819 FACET ARTHROPATHY: ICD-10-CM

## 2023-07-17 DIAGNOSIS — G89.29 CHRONIC BILATERAL LOW BACK PAIN WITHOUT SCIATICA: ICD-10-CM

## 2023-07-17 DIAGNOSIS — M47.816 LUMBAR SPONDYLOSIS: Primary | ICD-10-CM

## 2023-07-17 PROCEDURE — 99214 OFFICE O/P EST MOD 30 MIN: CPT | Performed by: PHYSICAL MEDICINE & REHABILITATION

## 2023-07-17 PROCEDURE — 1123F ACP DISCUSS/DSCN MKR DOCD: CPT | Performed by: PHYSICAL MEDICINE & REHABILITATION

## 2023-07-17 RX ORDER — SODIUM CHLORIDE 0.9 % (FLUSH) 0.9 %
5-40 SYRINGE (ML) INJECTION PRN
OUTPATIENT
Start: 2023-07-17

## 2023-07-17 RX ORDER — SODIUM CHLORIDE 9 MG/ML
INJECTION, SOLUTION INTRAVENOUS PRN
OUTPATIENT
Start: 2023-07-17

## 2023-07-17 RX ORDER — SODIUM CHLORIDE 0.9 % (FLUSH) 0.9 %
5-40 SYRINGE (ML) INJECTION EVERY 12 HOURS SCHEDULED
OUTPATIENT
Start: 2023-07-17

## 2023-07-17 NOTE — H&P
Conner Michaels, [address] [facility] Physical Medicine and Rehabilitation  [address] [place], [place] [address] [facility] [address], [address] [place] [address]  Phone: [phone]  Fax: [phone]    PCP: Joe Lu MD  Date of visit: 7/17/23    Chief Complaint   Patient presents with    Back Pain     Follow up after MBB #2       Interval:   Patient presents today for follow up visit after undergoing bilateral MBB L4-5 and L5-S1 #2. He had 90% improvement the day of the procedure then the pain returned the following the day. The pain is rated 7/10, is described as achy, burning, and is located in the low back without radiation to the legs. The pain is better with rest.  The pain is worse with standing and walking. There is no associated numbness/tingling. There is no weakness. There is no bowel/bladder changes. Pain is affecting his function. Tried tramadol and became altered. The prior workup has included: Xray, MRI     The prior treatment has included:  PT: in past  Chiropractic: none    Modalities: none   OTC Tylenol: yes  NSAIDS: CAD. Toradol IM  Opioids: tramadol  Membrane stabilizers: neurontin   Muscle relaxers: none    Previous injections: bilateral L4-5 and L5-S1 facet injections- 90% relief   Bilateral MBB L4-5 and L5-S1- 100% relief twice  Bilateral L4-5 and L5-S1 RFA  Bilateral SI  Bilateral L4-5, L5-S1 MBB #2  Previous surgery at this site: none    No Known Allergies    Current Outpatient Medications   Medication Sig Dispense Refill    levothyroxine (SYNTHROID) 50 MCG tablet TAKE ONE TABLET BY MOUTH DAILY 90 tablet 1    gabapentin (NEURONTIN) 100 MG capsule Take 1 capsule by mouth 2 times daily.  180 capsule 1    tamsulosin (FLOMAX) 0.4 MG capsule Take 1 capsule by mouth daily 90 capsule 1    finasteride (PROSCAR) 5 MG tablet Take 1 tablet by mouth daily 90 tablet 1    ONE TOUCH ULTRASOFT LANCETS MISC Check blood sugar qam 50 each 12    blood glucose test strips (ONE TOUCH ULTRA TEST) strip Check blood

## 2023-07-17 NOTE — PROGRESS NOTES
Conner Michaels, 1300 [facility] Physical Medicine and Rehabilitation  [address], [address] [place] [address]  Phone: [phone]  Fax: [phone]    PCP: Joe Lu MD  Date of visit: 7/17/23    Chief Complaint   Patient presents with    Back Pain     Follow up after MBB #2       Interval:   Patient presents today for follow up visit after undergoing bilateral MBB L4-5 and L5-S1 #2. He had 90% improvement the day of the procedure then the pain returned the following the day. The pain is rated 7/10, is described as achy, burning, and is located in the low back without radiation to the legs. The pain is better with rest.  The pain is worse with standing and walking. There is no associated numbness/tingling. There is no weakness. There is no bowel/bladder changes. Pain is affecting his function. Tried tramadol and became altered. The prior workup has included: Xray, MRI     The prior treatment has included:  PT: in past  Chiropractic: none    Modalities: none   OTC Tylenol: yes  NSAIDS: CAD. Toradol IM  Opioids: tramadol  Membrane stabilizers: neurontin   Muscle relaxers: none    Previous injections: bilateral L4-5 and L5-S1 facet injections- 90% relief   Bilateral MBB L4-5 and L5-S1- 100% relief twice  Bilateral L4-5 and L5-S1 RFA  Bilateral SI  Bilateral L4-5, L5-S1 MBB #2  Previous surgery at this site: none    No Known Allergies    Current Outpatient Medications   Medication Sig Dispense Refill    levothyroxine (SYNTHROID) 50 MCG tablet TAKE ONE TABLET BY MOUTH DAILY 90 tablet 1    gabapentin (NEURONTIN) 100 MG capsule Take 1 capsule by mouth 2 times daily.  180 capsule 1    tamsulosin (FLOMAX) 0.4 MG capsule Take 1 capsule by mouth daily 90 capsule 1    finasteride (PROSCAR) 5 MG tablet Take 1 tablet by mouth daily 90 tablet 1    ONE TOUCH ULTRASOFT LANCETS MISC Check blood sugar qam 50 each 12    blood glucose test strips (ONE TOUCH ULTRA TEST) strip Check blood

## 2023-07-21 ENCOUNTER — PREP FOR PROCEDURE (OUTPATIENT)
Dept: PHYSICAL MEDICINE AND REHAB | Age: 88
End: 2023-07-21

## 2023-07-21 ENCOUNTER — TELEPHONE (OUTPATIENT)
Dept: PHYSICAL MEDICINE AND REHAB | Age: 88
End: 2023-07-21

## 2023-07-21 NOTE — TELEPHONE ENCOUNTER
Called and spoke with the patient and informed him that I received approval for his bilateral RFA. Patient is scheduled on 7-27-23 for right RFA and 8-10-23 for left RFA. Patient was informed that the surgery center will call him the day before procedures to let him know the time. Patient is aware and voiced understanding.

## 2023-07-27 ENCOUNTER — HOSPITAL ENCOUNTER (OUTPATIENT)
Age: 88
Setting detail: OUTPATIENT SURGERY
Discharge: HOME | End: 2023-07-27
Attending: PHYSICAL MEDICINE & REHABILITATION | Admitting: PHYSICAL MEDICINE & REHABILITATION
Payer: MEDICARE

## 2023-07-27 ENCOUNTER — HOSPITAL ENCOUNTER (OUTPATIENT)
Dept: OPERATING ROOM | Age: 88
Setting detail: OUTPATIENT SURGERY
Discharge: HOME | End: 2023-07-27
Attending: PHYSICAL MEDICINE & REHABILITATION
Payer: MEDICARE

## 2023-07-27 VITALS
BODY MASS INDEX: 30.96 KG/M2 | OXYGEN SATURATION: 97 % | SYSTOLIC BLOOD PRESSURE: 123 MMHG | DIASTOLIC BLOOD PRESSURE: 56 MMHG | HEIGHT: 69 IN | WEIGHT: 209 LBS | HEART RATE: 65 BPM | RESPIRATION RATE: 16 BRPM

## 2023-07-27 DIAGNOSIS — M47.816 OSTEOARTHRITIS OF LUMBAR SPINE, UNSPECIFIED SPINAL OSTEOARTHRITIS COMPLICATION STATUS: ICD-10-CM

## 2023-07-27 PROCEDURE — 7100000010 HC PHASE II RECOVERY - FIRST 15 MIN: Performed by: PHYSICAL MEDICINE & REHABILITATION

## 2023-07-27 PROCEDURE — 7100000011 HC PHASE II RECOVERY - ADDTL 15 MIN: Performed by: PHYSICAL MEDICINE & REHABILITATION

## 2023-07-27 PROCEDURE — 2709999900 HC NON-CHARGEABLE SUPPLY: Performed by: PHYSICAL MEDICINE & REHABILITATION

## 2023-07-27 PROCEDURE — 64635 DESTROY LUMB/SAC FACET JNT: CPT | Performed by: PHYSICAL MEDICINE & REHABILITATION

## 2023-07-27 PROCEDURE — 2500000003 HC RX 250 WO HCPCS: Performed by: PHYSICAL MEDICINE & REHABILITATION

## 2023-07-27 PROCEDURE — 64636 DESTROY L/S FACET JNT ADDL: CPT | Performed by: PHYSICAL MEDICINE & REHABILITATION

## 2023-07-27 PROCEDURE — 3600000005 HC SURGERY LEVEL 5 BASE: Performed by: PHYSICAL MEDICINE & REHABILITATION

## 2023-07-27 RX ORDER — SODIUM CHLORIDE 0.9 % (FLUSH) 0.9 %
5-40 SYRINGE (ML) INJECTION PRN
Status: DISCONTINUED | OUTPATIENT
Start: 2023-07-27 | End: 2023-07-27 | Stop reason: HOSPADM

## 2023-07-27 RX ORDER — LIDOCAINE HYDROCHLORIDE 20 MG/ML
INJECTION, SOLUTION EPIDURAL; INFILTRATION; INTRACAUDAL; PERINEURAL PRN
Status: DISCONTINUED | OUTPATIENT
Start: 2023-07-27 | End: 2023-07-27 | Stop reason: ALTCHOICE

## 2023-07-27 RX ORDER — SODIUM CHLORIDE 0.9 % (FLUSH) 0.9 %
5-40 SYRINGE (ML) INJECTION EVERY 12 HOURS SCHEDULED
Status: DISCONTINUED | OUTPATIENT
Start: 2023-07-27 | End: 2023-07-27 | Stop reason: HOSPADM

## 2023-07-27 RX ORDER — SODIUM CHLORIDE 9 MG/ML
INJECTION, SOLUTION INTRAVENOUS PRN
Status: DISCONTINUED | OUTPATIENT
Start: 2023-07-27 | End: 2023-07-27 | Stop reason: HOSPADM

## 2023-07-27 RX ORDER — LIDOCAINE HYDROCHLORIDE 10 MG/ML
INJECTION, SOLUTION EPIDURAL; INFILTRATION; INTRACAUDAL; PERINEURAL PRN
Status: DISCONTINUED | OUTPATIENT
Start: 2023-07-27 | End: 2023-07-27 | Stop reason: ALTCHOICE

## 2023-07-27 ASSESSMENT — PAIN - FUNCTIONAL ASSESSMENT: PAIN_FUNCTIONAL_ASSESSMENT: 0-10

## 2023-07-27 ASSESSMENT — PAIN DESCRIPTION - DESCRIPTORS
DESCRIPTORS: DISCOMFORT
DESCRIPTORS: ACHING

## 2023-07-27 ASSESSMENT — PAIN DESCRIPTION - LOCATION: LOCATION: BACK

## 2023-07-27 ASSESSMENT — PAIN SCALES - GENERAL
PAINLEVEL_OUTOF10: 4
PAINLEVEL_OUTOF10: 0

## 2023-07-27 NOTE — OP NOTE
Diomedes Goff    7/27/2023      PRE-OPERATIVE DIAGNOSIS:  Lumbar spondylosis,  lumbar facet arthropathy, lumbosacral OA. POST-OPERATIVE DIAGNOSIS:  Lumbar spondylosis, lumbar facet arthropathy, lumbosacral OA. PROCEDURE:  Fluoroscopic-guided Right  lumbar medial branch neurolysis at  levels  L4-5, L5-S1    SURGEON:    Jose Juárez DO    ANESTHESIA:   Local    ESTIMATED BLOOD LOSS:  None.  ______________________________________________________________________    HISTORY & PHYSICAL:   See separate H&P    PROCEDURE:  After confirming written and informed consent, Isa Garcia was brought to the procedure room and placed in the prone position. Blood pressure, heart rate, O2 saturation, and visual and verbal monitoring were established. A time-out was performed and his name and date of birth, the procedure to be performed as well as the plan for the location of the needle insertion were confirmed. Fluoroscopy was utilized to delineate the anatomy of the lumbar spine. Surface landmarks were identified as well. After prep and drape, an oblique fluoroscopic view was created to optimize visualization of the junction of the transverse process and the pedicle. After infiltration of local, # 20-gauge 100-mm 10- mm active tip radiofrequency ablation needle was passed to position the tip at the junction of the superior articular process and the transverse process, along the course of the medial branch. Satisfactory needle placement was confirmed by AP, lateral and oblique projections. At the sacral ala level, the sacral alar region was visualized and the needle tip was positioned on the sacral ala at the base of the superior articulating process where the medial branch traverses. Satisfactory needle placement was confirmed by AP, lateral and oblique projections. Sensory and motor testing was then performed. He  then received 0.75 cc of 1% PF xylocaine at each level.        Radiofrequency thermal

## 2023-07-28 ENCOUNTER — TELEPHONE (OUTPATIENT)
Dept: ADMINISTRATIVE | Age: 88
End: 2023-07-28

## 2023-07-28 NOTE — TELEPHONE ENCOUNTER
Patient had MBB on 7/27 but is also scheduled for another on 8/10. Please advise, Should he wait for the follow up with office.

## 2023-07-28 NOTE — TELEPHONE ENCOUNTER
Called and spoke with the patient and informed him that he can make a follow up appointment after his second MBB. Patient is aware and voiced understanding.

## 2023-08-07 DIAGNOSIS — M47.816 LUMBAR SPONDYLOSIS: ICD-10-CM

## 2023-08-08 RX ORDER — SODIUM CHLORIDE 9 MG/ML
INJECTION, SOLUTION INTRAVENOUS PRN
Status: CANCELLED | OUTPATIENT
Start: 2023-08-08

## 2023-08-08 RX ORDER — SODIUM CHLORIDE 0.9 % (FLUSH) 0.9 %
5-40 SYRINGE (ML) INJECTION EVERY 12 HOURS SCHEDULED
Status: CANCELLED | OUTPATIENT
Start: 2023-08-08

## 2023-08-08 RX ORDER — SODIUM CHLORIDE 0.9 % (FLUSH) 0.9 %
5-40 SYRINGE (ML) INJECTION PRN
Status: CANCELLED | OUTPATIENT
Start: 2023-08-08

## 2023-08-10 ENCOUNTER — HOSPITAL ENCOUNTER (OUTPATIENT)
Dept: OPERATING ROOM | Age: 88
Setting detail: OUTPATIENT SURGERY
Discharge: HOME | End: 2023-08-10
Attending: PHYSICAL MEDICINE & REHABILITATION
Payer: MEDICARE

## 2023-08-10 ENCOUNTER — HOSPITAL ENCOUNTER (OUTPATIENT)
Age: 88
Setting detail: OUTPATIENT SURGERY
Discharge: HOME | End: 2023-08-10
Attending: PHYSICAL MEDICINE & REHABILITATION | Admitting: PHYSICAL MEDICINE & REHABILITATION
Payer: MEDICARE

## 2023-08-10 VITALS
HEART RATE: 53 BPM | RESPIRATION RATE: 16 BRPM | OXYGEN SATURATION: 96 % | HEIGHT: 69 IN | WEIGHT: 209 LBS | SYSTOLIC BLOOD PRESSURE: 132 MMHG | DIASTOLIC BLOOD PRESSURE: 60 MMHG | TEMPERATURE: 97.4 F | BODY MASS INDEX: 30.96 KG/M2

## 2023-08-10 DIAGNOSIS — M47.896 OTHER OSTEOARTHRITIS OF SPINE, LUMBAR REGION: ICD-10-CM

## 2023-08-10 PROCEDURE — 7100000011 HC PHASE II RECOVERY - ADDTL 15 MIN: Performed by: PHYSICAL MEDICINE & REHABILITATION

## 2023-08-10 PROCEDURE — 7100000010 HC PHASE II RECOVERY - FIRST 15 MIN: Performed by: PHYSICAL MEDICINE & REHABILITATION

## 2023-08-10 PROCEDURE — 2709999900 HC NON-CHARGEABLE SUPPLY: Performed by: PHYSICAL MEDICINE & REHABILITATION

## 2023-08-10 PROCEDURE — 3600000005 HC SURGERY LEVEL 5 BASE: Performed by: PHYSICAL MEDICINE & REHABILITATION

## 2023-08-10 PROCEDURE — 2500000003 HC RX 250 WO HCPCS: Performed by: PHYSICAL MEDICINE & REHABILITATION

## 2023-08-10 RX ORDER — LIDOCAINE HYDROCHLORIDE 10 MG/ML
INJECTION, SOLUTION EPIDURAL; INFILTRATION; INTRACAUDAL; PERINEURAL PRN
Status: DISCONTINUED | OUTPATIENT
Start: 2023-08-10 | End: 2023-08-10 | Stop reason: ALTCHOICE

## 2023-08-10 RX ORDER — SODIUM CHLORIDE 0.9 % (FLUSH) 0.9 %
5-40 SYRINGE (ML) INJECTION EVERY 12 HOURS SCHEDULED
Status: DISCONTINUED | OUTPATIENT
Start: 2023-08-10 | End: 2023-08-10 | Stop reason: HOSPADM

## 2023-08-10 RX ORDER — LIDOCAINE HYDROCHLORIDE 20 MG/ML
INJECTION, SOLUTION EPIDURAL; INFILTRATION; INTRACAUDAL; PERINEURAL PRN
Status: DISCONTINUED | OUTPATIENT
Start: 2023-08-10 | End: 2023-08-10 | Stop reason: ALTCHOICE

## 2023-08-10 RX ORDER — SODIUM CHLORIDE 9 MG/ML
INJECTION, SOLUTION INTRAVENOUS PRN
Status: DISCONTINUED | OUTPATIENT
Start: 2023-08-10 | End: 2023-08-10 | Stop reason: HOSPADM

## 2023-08-10 RX ORDER — SODIUM CHLORIDE 0.9 % (FLUSH) 0.9 %
5-40 SYRINGE (ML) INJECTION PRN
Status: DISCONTINUED | OUTPATIENT
Start: 2023-08-10 | End: 2023-08-10 | Stop reason: HOSPADM

## 2023-08-10 ASSESSMENT — PAIN SCALES - GENERAL: PAINLEVEL_OUTOF10: 2

## 2023-08-10 ASSESSMENT — PAIN DESCRIPTION - LOCATION: LOCATION: BACK

## 2023-08-10 ASSESSMENT — PAIN - FUNCTIONAL ASSESSMENT: PAIN_FUNCTIONAL_ASSESSMENT: 0-10

## 2023-08-10 ASSESSMENT — PAIN DESCRIPTION - DESCRIPTORS: DESCRIPTORS: ACHING

## 2023-08-10 ASSESSMENT — PAIN DESCRIPTION - PAIN TYPE: TYPE: SURGICAL PAIN;CHRONIC PAIN

## 2023-08-10 NOTE — H&P
Rosie Dutton, 1300 [facility] Physical Medicine and Rehabilitation  [address] [facility] [address], [address] [place] [address]  Phone: [phone]  Fax: [phone]     PCP: Danny Colon MD  Date of visit: 7/17/23          Chief Complaint   Patient presents with    Back Pain       Follow up after MBB #2         Interval:   Patient presents today for follow up visit after undergoing bilateral MBB L4-5 and L5-S1 #2. He had 90% improvement the day of the procedure then the pain returned the following the day. The pain is rated 7/10, is described as achy, burning, and is located in the low back without radiation to the legs. The pain is better with rest.  The pain is worse with standing and walking. There is no associated numbness/tingling. There is no weakness. There is no bowel/bladder changes. Pain is affecting his function. Tried tramadol and became altered. The prior workup has included: Xray, MRI      The prior treatment has included:  PT: in past  Chiropractic: none    Modalities: none   OTC Tylenol: yes  NSAIDS: CAD. Toradol IM  Opioids: tramadol  Membrane stabilizers: neurontin   Muscle relaxers: none    Previous injections: bilateral L4-5 and L5-S1 facet injections- 90% relief   Bilateral MBB L4-5 and L5-S1- 100% relief twice  Bilateral L4-5 and L5-S1 RFA  Bilateral SI  Bilateral L4-5, L5-S1 MBB #2  Previous surgery at this site: none     No Known Allergies            Current Outpatient Medications   Medication Sig Dispense Refill    levothyroxine (SYNTHROID) 50 MCG tablet TAKE ONE TABLET BY MOUTH DAILY 90 tablet 1    gabapentin (NEURONTIN) 100 MG capsule Take 1 capsule by mouth 2 times daily.  180 capsule 1    tamsulosin (FLOMAX) 0.4 MG capsule Take 1 capsule by mouth daily 90 capsule 1    finasteride (PROSCAR) 5 MG tablet Take 1 tablet by mouth daily 90 tablet 1    ONE TOUCH ULTRASOFT LANCETS MISC Check blood sugar qam 50 each 12    blood glucose test strips (ONE TOUCH ULTRA TEST)

## 2023-08-10 NOTE — OP NOTE
January Dio    8/10/2023      PRE-OPERATIVE DIAGNOSIS:  Lumbar spondylosis,  lumbar facet arthropathy, lumbosacral OA. POST-OPERATIVE DIAGNOSIS:  Lumbar spondylosis, lumbar facet arthropathy, lumbosacral OA. PROCEDURE:  Fluoroscopic-guided Left  lumbar medial branch neurolysis at  levels L4-5, L5-S1    SURGEON:    Krystal Solorio DO    ANESTHESIA:   Local    ESTIMATED BLOOD LOSS:  None.  ______________________________________________________________________    HISTORY & PHYSICAL:   See separate H&P    PROCEDURE:  After confirming written and informed consent, Adrianna Fontenot was brought to the procedure room and placed in the prone position. Blood pressure, heart rate, O2 saturation, and visual and verbal monitoring were established. A time-out was performed and his name and date of birth, the procedure to be performed as well as the plan for the location of the needle insertion were confirmed. Fluoroscopy was utilized to delineate the anatomy of the lumbar spine. Surface landmarks were identified as well. After prep and drape, an oblique fluoroscopic view was created to optimize visualization of the junction of the transverse process and the pedicle. After infiltration of local, # 20-gauge 100-mm 10- mm active tip radiofrequency ablation needle was passed to position the tip at the junction of the superior articular process and the transverse process, along the course of the medial branch. Satisfactory needle placement was confirmed by AP, lateral and oblique projections. At the sacral ala level, the sacral alar region was visualized and the needle tip was positioned on the sacral ala at the base of the superior articulating process where the medial branch traverses. Satisfactory needle placement was confirmed by AP, lateral and oblique projections. Sensory and motor testing was then performed. He  then received 0.75 cc of 1% PF xylocaine at each level.        Radiofrequency thermal ablation

## 2023-09-06 ENCOUNTER — OFFICE VISIT (OUTPATIENT)
Dept: FAMILY MEDICINE CLINIC | Age: 88
End: 2023-09-06
Payer: MEDICARE

## 2023-09-06 VITALS
DIASTOLIC BLOOD PRESSURE: 82 MMHG | OXYGEN SATURATION: 100 % | WEIGHT: 211 LBS | SYSTOLIC BLOOD PRESSURE: 124 MMHG | BODY MASS INDEX: 31.25 KG/M2 | HEIGHT: 69 IN | HEART RATE: 58 BPM | RESPIRATION RATE: 20 BRPM

## 2023-09-06 DIAGNOSIS — N40.1 BENIGN PROSTATIC HYPERPLASIA WITH URINARY FREQUENCY: ICD-10-CM

## 2023-09-06 DIAGNOSIS — Z12.5 ENCOUNTER FOR SCREENING FOR MALIGNANT NEOPLASM OF PROSTATE: ICD-10-CM

## 2023-09-06 DIAGNOSIS — Z23 NEED FOR SHINGLES VACCINE: ICD-10-CM

## 2023-09-06 DIAGNOSIS — E03.9 ACQUIRED HYPOTHYROIDISM: ICD-10-CM

## 2023-09-06 DIAGNOSIS — E11.9 TYPE 2 DIABETES MELLITUS WITHOUT COMPLICATION, WITHOUT LONG-TERM CURRENT USE OF INSULIN (HCC): Primary | ICD-10-CM

## 2023-09-06 DIAGNOSIS — R35.0 BENIGN PROSTATIC HYPERPLASIA WITH URINARY FREQUENCY: ICD-10-CM

## 2023-09-06 LAB — HBA1C MFR BLD: 6.1 %

## 2023-09-06 PROCEDURE — 1123F ACP DISCUSS/DSCN MKR DOCD: CPT | Performed by: FAMILY MEDICINE

## 2023-09-06 PROCEDURE — 83036 HEMOGLOBIN GLYCOSYLATED A1C: CPT | Performed by: FAMILY MEDICINE

## 2023-09-06 PROCEDURE — 3044F HG A1C LEVEL LT 7.0%: CPT | Performed by: FAMILY MEDICINE

## 2023-09-06 PROCEDURE — 99214 OFFICE O/P EST MOD 30 MIN: CPT | Performed by: FAMILY MEDICINE

## 2023-09-06 RX ORDER — LEVOTHYROXINE SODIUM 0.05 MG/1
TABLET ORAL
Qty: 90 TABLET | Refills: 1 | Status: SHIPPED | OUTPATIENT
Start: 2023-09-06

## 2023-09-06 RX ORDER — FINASTERIDE 5 MG/1
5 TABLET, FILM COATED ORAL DAILY
Qty: 90 TABLET | Refills: 1 | Status: SHIPPED | OUTPATIENT
Start: 2023-09-06

## 2023-09-06 RX ORDER — GABAPENTIN 100 MG/1
100 CAPSULE ORAL 2 TIMES DAILY
Qty: 180 CAPSULE | Refills: 1 | Status: SHIPPED | OUTPATIENT
Start: 2023-09-06 | End: 2024-09-05

## 2023-09-06 RX ORDER — TAMSULOSIN HYDROCHLORIDE 0.4 MG/1
0.4 CAPSULE ORAL DAILY
Qty: 90 CAPSULE | Refills: 1 | Status: SHIPPED | OUTPATIENT
Start: 2023-09-06

## 2023-09-06 RX ORDER — ZOSTER VACCINE RECOMBINANT, ADJUVANTED 50 MCG/0.5
0.5 KIT INTRAMUSCULAR SEE ADMIN INSTRUCTIONS
Qty: 0.5 ML | Refills: 0 | Status: SHIPPED | OUTPATIENT
Start: 2023-09-06 | End: 2024-03-04

## 2023-09-06 RX ORDER — BLOOD SUGAR DIAGNOSTIC
STRIP MISCELLANEOUS
Qty: 50 EACH | Refills: 12 | Status: SHIPPED | OUTPATIENT
Start: 2023-09-06

## 2023-09-06 ASSESSMENT — ENCOUNTER SYMPTOMS
VOMITING: 0
DIARRHEA: 0
SHORTNESS OF BREATH: 0
NAUSEA: 0

## 2023-09-06 NOTE — PROGRESS NOTES
OFFICE PROGRESS NOTE      SUBJECTIVE:        Patient ID:   Jerome Talavera is a 81 y.o. male whopresents for   Chief Complaint   Patient presents with    Diabetes     Needs strips will not let me order            HPI:   Patient is here to follow up on diabetes. Fasting blood sugars:103-130 Midday blood sugars: not checking. Patient checks blood glucose 1 times per day. Patient is following diabetic diet. Patient is a nonsmoker. Last ophthalmology visit: 6/2023. Patient is taking a daily statin. Patient doing well on current regimen for BPH. Prior to Admission medications    Medication Sig Start Date End Date Taking? Authorizing Provider   levothyroxine (SYNTHROID) 50 MCG tablet TAKE ONE TABLET BY MOUTH DAILY 9/6/23  Yes Cordell Albarran MD   finasteride (PROSCAR) 5 MG tablet Take 1 tablet by mouth daily 9/6/23  Yes Cordell Albarran MD   gabapentin (NEURONTIN) 100 MG capsule Take 1 capsule by mouth 2 times daily.  9/6/23 9/5/24 Yes Cordell Albarran MD   tamsulosin (FLOMAX) 0.4 MG capsule Take 1 capsule by mouth daily 9/6/23  Yes Cordell Albarran MD   zoster recombinant adjuvanted vaccine [facility]) 50 MCG/0.5ML SUSR injection Inject 0.5 mLs into the muscle See Admin Instructions 1 dose now and repeat in 2-6 months 9/6/23 3/4/24 Yes Cordell Albarran MD   blood glucose test strips (ONETOUCH ULTRA) strip Check blood sugar qam 9/6/23  Yes Cordell Albarran MD   ONE TOUCH ULTRASOFT LANCETS MISC Check blood sugar qam 9/25/22  Yes Cordell Albarran MD   Handicap Placard MISC by Does not apply route Reason for disability:  Arthritis of sacroiliac joint  (primary encounter diagnosis)  Lumbar spondylosis  Facet arthropathy  Impaired gait    Duration: 5 years 9/27/21  Yes Authur Soulier Masternick-Black, DO   Lancet Device MISC Use daily for glucose monitoring 3/9/20  Yes Cordell Albarran MD   Blood Glucose Monitoring Suppl (ONE TOUCH ULTRA 2)

## 2023-09-11 ENCOUNTER — OFFICE VISIT (OUTPATIENT)
Dept: PHYSICAL MEDICINE AND REHAB | Age: 88
End: 2023-09-11
Payer: MEDICARE

## 2023-09-11 VITALS
DIASTOLIC BLOOD PRESSURE: 61 MMHG | SYSTOLIC BLOOD PRESSURE: 134 MMHG | BODY MASS INDEX: 31.55 KG/M2 | HEART RATE: 82 BPM | HEIGHT: 69 IN | WEIGHT: 213 LBS

## 2023-09-11 DIAGNOSIS — M47.819 FACET ARTHROPATHY: ICD-10-CM

## 2023-09-11 DIAGNOSIS — M47.816 LUMBAR SPONDYLOSIS: ICD-10-CM

## 2023-09-11 DIAGNOSIS — M54.50 ACUTE EXACERBATION OF CHRONIC LOW BACK PAIN: Primary | ICD-10-CM

## 2023-09-11 DIAGNOSIS — G89.29 ACUTE EXACERBATION OF CHRONIC LOW BACK PAIN: Primary | ICD-10-CM

## 2023-09-11 PROCEDURE — 1123F ACP DISCUSS/DSCN MKR DOCD: CPT | Performed by: PHYSICAL MEDICINE & REHABILITATION

## 2023-09-11 PROCEDURE — 99214 OFFICE O/P EST MOD 30 MIN: CPT | Performed by: PHYSICAL MEDICINE & REHABILITATION

## 2023-09-11 RX ORDER — METHYLPREDNISOLONE 4 MG/1
TABLET ORAL
Qty: 1 KIT | Refills: 0 | Status: SHIPPED | OUTPATIENT
Start: 2023-09-11

## 2023-09-11 NOTE — PROGRESS NOTES
Rosie Hurtado, 1300 [facility] Physical Medicine and Rehabilitation  [address] [place] [address]. [facility] [address], [address] [place] [address]  Phone: [phone]  Fax: [phone]    PCP: Mini Lake MD  Date of visit: 9/11/23    Chief Complaint   Patient presents with    Back Pain     Follow up after MBB       Interval:   Patient presents today for follow up visit after undergoing bilateral RFA  L4-5 and L5-S1. He was doing very well after the procedure. Had no complaints until 5 days ago and he woke up with left sided buttock pain. The pain is worse with bending forward, better with extension. Described as achy. The pain is rated 8/10. It does not radiate to leg. There is no associated numbness/tingling. There is no weakness. There is no bowel/bladder changes. Pain is affecting his function. Tried tramadol and became altered. The prior workup has included: Xray, MRI     The prior treatment has included:  PT: in past  Chiropractic: none    Modalities: none   OTC Tylenol: yes  NSAIDS: CAD. Toradol IM  Opioids: tramadol- made him confused   Membrane stabilizers: neurontin   Muscle relaxers: none    Previous injections: bilateral L4-5 and L5-S1 facet injections- 90% relief   Bilateral MBB L4-5 and L5-S1- 100% relief twice  Bilateral L4-5 and L5-S1 RFA  Bilateral SI  Bilateral L4-5, L5-S1 MBB #2 and RFA   Previous surgery at this site: none    Allergies   Allergen Reactions    Tramadol Hcl Hallucinations       Current Outpatient Medications   Medication Sig Dispense Refill    methylPREDNISolone (MEDROL DOSEPACK) 4 MG tablet Take by mouth as directed 1 kit 0    levothyroxine (SYNTHROID) 50 MCG tablet TAKE ONE TABLET BY MOUTH DAILY 90 tablet 1    finasteride (PROSCAR) 5 MG tablet Take 1 tablet by mouth daily 90 tablet 1    gabapentin (NEURONTIN) 100 MG capsule Take 1 capsule by mouth 2 times daily.  180 capsule 1    tamsulosin (FLOMAX) 0.4 MG capsule Take 1 capsule by mouth daily 90 capsule 1    zoster

## 2023-09-12 DIAGNOSIS — E11.9 TYPE 2 DIABETES MELLITUS WITHOUT COMPLICATION, WITHOUT LONG-TERM CURRENT USE OF INSULIN (HCC): ICD-10-CM

## 2023-09-12 DIAGNOSIS — E03.9 ACQUIRED HYPOTHYROIDISM: ICD-10-CM

## 2023-09-12 DIAGNOSIS — Z12.5 ENCOUNTER FOR SCREENING FOR MALIGNANT NEOPLASM OF PROSTATE: ICD-10-CM

## 2023-09-12 LAB
ALBUMIN SERPL-MCNC: 4 G/DL (ref 3.5–5.2)
ALP BLD-CCNC: 77 U/L (ref 40–129)
ALT SERPL-CCNC: 14 U/L (ref 0–40)
ANION GAP SERPL CALCULATED.3IONS-SCNC: 12 MMOL/L (ref 7–16)
AST SERPL-CCNC: 18 U/L (ref 0–39)
BILIRUB SERPL-MCNC: 0.3 MG/DL (ref 0–1.2)
BUN BLDV-MCNC: 20 MG/DL (ref 6–23)
CALCIUM SERPL-MCNC: 9 MG/DL (ref 8.6–10.2)
CHLORIDE BLD-SCNC: 104 MMOL/L (ref 98–107)
CHOLESTEROL: 132 MG/DL
CO2: 24 MMOL/L (ref 22–29)
CREAT SERPL-MCNC: 1.1 MG/DL (ref 0.7–1.2)
GFR SERPL CREATININE-BSD FRML MDRD: >60 ML/MIN/1.73M2
GLUCOSE BLD-MCNC: 119 MG/DL (ref 74–99)
HCT VFR BLD CALC: 42 % (ref 37–54)
HDLC SERPL-MCNC: 35 MG/DL
HEMOGLOBIN: 13.3 G/DL (ref 12.5–16.5)
LDL CHOLESTEROL: 79 MG/DL
MCH RBC QN AUTO: 31.6 PG (ref 26–35)
MCHC RBC AUTO-ENTMCNC: 31.7 G/DL (ref 32–34.5)
MCV RBC AUTO: 99.8 FL (ref 80–99.9)
PDW BLD-RTO: 14.6 % (ref 11.5–15)
PLATELET # BLD: 195 K/UL (ref 130–450)
PMV BLD AUTO: 9.3 FL (ref 7–12)
POTASSIUM SERPL-SCNC: 5 MMOL/L (ref 3.5–5)
PROSTATE SPECIFIC ANTIGEN: 0.19 NG/ML (ref 0–4)
RBC # BLD: 4.21 M/UL (ref 3.8–5.8)
SODIUM BLD-SCNC: 140 MMOL/L (ref 132–146)
TOTAL PROTEIN: 6.8 G/DL (ref 6.4–8.3)
TRIGL SERPL-MCNC: 91 MG/DL
TSH SERPL DL<=0.05 MIU/L-ACNC: 3.63 UIU/ML (ref 0.27–4.2)
VLDLC SERPL CALC-MCNC: 18 MG/DL
WBC # BLD: 5.5 K/UL (ref 4.5–11.5)

## 2023-09-21 ENCOUNTER — TELEPHONE (OUTPATIENT)
Dept: PHYSICAL MEDICINE AND REHAB | Age: 88
End: 2023-09-21

## 2023-09-21 DIAGNOSIS — M54.17 LUMBOSACRAL RADICULITIS: Primary | ICD-10-CM

## 2023-09-21 NOTE — TELEPHONE ENCOUNTER
Patient notified  epidural ordered, no questions voiced.  Aware we will call ne approved to schedule

## 2023-09-21 NOTE — TELEPHONE ENCOUNTER
Patient called in stating he felt good the first day of his oral steroids and feels worse than before in his back and states he would like to proceed with a injection as was discussed at his office visit on 9/11/23.  Please advise

## 2023-09-25 NOTE — TELEPHONE ENCOUNTER
Pt had CT of head on 8/31/22 and is following up on it. Please advise.      Last seen 8/22/2022  Next appt 9/27/2022 Exam under anesthesia revealed a 5 week sized anteverted uterus. Vaginal appeared normal. Acetic acid applied to cervix showed minimal blanching on right side of cervical os. Stay sutures placed on lateral aspects of cervix. Excellent hemostasis noted at the end of procedure.

## 2023-09-29 ENCOUNTER — TELEPHONE (OUTPATIENT)
Dept: PHYSICAL MEDICINE AND REHAB | Age: 88
End: 2023-09-29

## 2023-10-02 ENCOUNTER — PREP FOR PROCEDURE (OUTPATIENT)
Dept: PHYSICAL MEDICINE AND REHAB | Age: 88
End: 2023-10-02

## 2023-10-02 DIAGNOSIS — M54.17 LUMBOSACRAL RADICULITIS: ICD-10-CM

## 2023-10-11 RX ORDER — SODIUM CHLORIDE 9 MG/ML
INJECTION, SOLUTION INTRAVENOUS PRN
Status: CANCELLED | OUTPATIENT
Start: 2023-10-11

## 2023-10-11 RX ORDER — SODIUM CHLORIDE 0.9 % (FLUSH) 0.9 %
5-40 SYRINGE (ML) INJECTION PRN
Status: CANCELLED | OUTPATIENT
Start: 2023-10-11

## 2023-10-11 RX ORDER — SODIUM CHLORIDE 0.9 % (FLUSH) 0.9 %
5-40 SYRINGE (ML) INJECTION EVERY 12 HOURS SCHEDULED
Status: CANCELLED | OUTPATIENT
Start: 2023-10-11

## 2023-10-12 ENCOUNTER — HOSPITAL ENCOUNTER (OUTPATIENT)
Age: 88
Setting detail: OUTPATIENT SURGERY
Discharge: HOME | End: 2023-10-12
Attending: PHYSICAL MEDICINE & REHABILITATION | Admitting: PHYSICAL MEDICINE & REHABILITATION
Payer: MEDICARE

## 2023-10-12 ENCOUNTER — HOSPITAL ENCOUNTER (OUTPATIENT)
Dept: OPERATING ROOM | Age: 88
Setting detail: OUTPATIENT SURGERY
Discharge: HOME | End: 2023-10-12
Attending: PHYSICAL MEDICINE & REHABILITATION
Payer: MEDICARE

## 2023-10-12 VITALS
DIASTOLIC BLOOD PRESSURE: 58 MMHG | OXYGEN SATURATION: 96 % | TEMPERATURE: 97 F | RESPIRATION RATE: 16 BRPM | HEART RATE: 55 BPM | SYSTOLIC BLOOD PRESSURE: 129 MMHG

## 2023-10-12 DIAGNOSIS — M51.9 LUMBAR DISC DISEASE: ICD-10-CM

## 2023-10-12 PROCEDURE — 2580000003 HC RX 258: Performed by: PHYSICAL MEDICINE & REHABILITATION

## 2023-10-12 PROCEDURE — 64483 NJX AA&/STRD TFRM EPI L/S 1: CPT | Performed by: PHYSICAL MEDICINE & REHABILITATION

## 2023-10-12 PROCEDURE — 3600000005 HC SURGERY LEVEL 5 BASE: Performed by: PHYSICAL MEDICINE & REHABILITATION

## 2023-10-12 PROCEDURE — 7100000011 HC PHASE II RECOVERY - ADDTL 15 MIN: Performed by: PHYSICAL MEDICINE & REHABILITATION

## 2023-10-12 PROCEDURE — 2500000003 HC RX 250 WO HCPCS: Performed by: PHYSICAL MEDICINE & REHABILITATION

## 2023-10-12 PROCEDURE — A4216 STERILE WATER/SALINE, 10 ML: HCPCS | Performed by: PHYSICAL MEDICINE & REHABILITATION

## 2023-10-12 PROCEDURE — 6360000002 HC RX W HCPCS: Performed by: PHYSICAL MEDICINE & REHABILITATION

## 2023-10-12 PROCEDURE — 6360000004 HC RX CONTRAST MEDICATION: Performed by: PHYSICAL MEDICINE & REHABILITATION

## 2023-10-12 PROCEDURE — 2709999900 HC NON-CHARGEABLE SUPPLY: Performed by: PHYSICAL MEDICINE & REHABILITATION

## 2023-10-12 PROCEDURE — 7100000010 HC PHASE II RECOVERY - FIRST 15 MIN: Performed by: PHYSICAL MEDICINE & REHABILITATION

## 2023-10-12 RX ORDER — LIDOCAINE HYDROCHLORIDE 10 MG/ML
INJECTION, SOLUTION EPIDURAL; INFILTRATION; INTRACAUDAL; PERINEURAL PRN
Status: DISCONTINUED | OUTPATIENT
Start: 2023-10-12 | End: 2023-10-12 | Stop reason: ALTCHOICE

## 2023-10-12 RX ORDER — SODIUM CHLORIDE 0.9 % (FLUSH) 0.9 %
5-40 SYRINGE (ML) INJECTION PRN
Status: DISCONTINUED | OUTPATIENT
Start: 2023-10-12 | End: 2023-10-12 | Stop reason: HOSPADM

## 2023-10-12 RX ORDER — SODIUM CHLORIDE 0.9 % (FLUSH) 0.9 %
5-40 SYRINGE (ML) INJECTION EVERY 12 HOURS SCHEDULED
Status: DISCONTINUED | OUTPATIENT
Start: 2023-10-12 | End: 2023-10-12 | Stop reason: HOSPADM

## 2023-10-12 RX ORDER — SODIUM CHLORIDE 9 MG/ML
INJECTION, SOLUTION INTRAVENOUS PRN
Status: DISCONTINUED | OUTPATIENT
Start: 2023-10-12 | End: 2023-10-12 | Stop reason: HOSPADM

## 2023-10-12 ASSESSMENT — PAIN DESCRIPTION - DESCRIPTORS: DESCRIPTORS: ACHING

## 2023-10-12 ASSESSMENT — PAIN - FUNCTIONAL ASSESSMENT: PAIN_FUNCTIONAL_ASSESSMENT: 0-10

## 2023-10-12 NOTE — H&P
Margret Ochoa, 1300 [facility] Physical Medicine and Rehabilitation  [address] [place] [address]. [facility] [address], [address] [place] [address]  Phone: [phone]  Fax: [phone]    PCP: Clotilde Wolfe MD  Date of visit: 10/12/2023    CC: low back and leg pain       Maday Leon is a 81 y.o. male who presents today for epidural steroid injection. No red flag symptoms. Consents to proceed with procedure.      Allergies   Allergen Reactions    Tramadol Hcl Hallucinations       Current Facility-Administered Medications   Medication Dose Route Frequency Provider Last Rate Last Admin    sodium chloride flush 0.9 % injection 5-40 mL  5-40 mL IntraVENous 2 times per day Gladys Christine,         sodium chloride flush 0.9 % injection 5-40 mL  5-40 mL IntraVENous PRN Gladys Christine DO        0.9 % sodium chloride infusion   IntraVENous PRN Gladys Christine DO           Past Medical History:   Diagnosis Date    Acquired hypothyroidism 9/5/2021    Bundle branch block     CAD (coronary artery disease)     Diabetes mellitus ([facility])     Hyperlipidemia LDL goal < 100 8/24/2015    Lumbosacral radiculitis 10/2/2023    Spondylosis of lumbar region without myelopathy or radiculopathy 8/28/2018       Past Surgical History:   Procedure Laterality Date    ANESTHESIA NERVE BLOCK Bilateral 2/28/2019    BILATERAL L4-5 L5-S1 INTRA-ARTICULAR FACET STEROID INJECTION (CPT 73829) performed by Margret Ochoa DO at [facility] Bilateral 9/30/2021    BILATERAL SACROILIAC JOINT INJECTION UNDER X-RAY GUIDANCE performed by Margret Ochoa DO at [facility]  3/27/13    off-pump cabg x 3- Dr. Cornelius Sawyer CATH LAB PROCEDURE  3/26/13    FRACTURE SURGERY      NERVE BLOCK Bilateral 02/28/2019    lumbar intra-articular facet    NERVE BLOCK Bilateral 4/29/2021    BILATERAL MEDIAL BRANCH BLOCK AT L4-5 AND L5-S1 (CPT 91779)

## 2023-10-12 NOTE — OP NOTE
LUMBOSACRAL EPIDURAL STEROID INJECTION, TRANSFORAMINAL APPROACH       WITH FLUOROSCOPIC GUIDANCE    Patient: Jerome Talavera                         MRN#: [id number]  : 1935   Date of procedure: 10/12/2023      Physician Performing Procedure:  Arron Duran DO    Clinical Scenario: As per electronic documentation. Diagnosis: lumbar radiculitis     Injectate: A total of 3cc, consisting of 1 cc of Dexamethasone 10mg/ml,  with the remainder of normal saline    Levels Treated:  left L4-5 neural foramen    Approach:  Transforaminal    Improvement after today's procedure: As per nursing record. Comments:  none     Pre-procedural evaluation: The patient was examined today just prior to performing the procedure listed above. The patient's heart rate was normal, lungs were clear to auscultation bilaterally. Procedure: The patient was prepped and draped in a sterile fashion in the prone position after informed consent was signed and all the patient's questions were answered including the risks, benefits, alternative treatment options, and prognosis. The risks include - but are not limited to - infection, allergic reaction, increased pain, lack of therapeutic benefit, steroid reaction, nerve damage, paralysis, stroke, epidural hematoma, syncope, headache, respiratory or cardiac arrest, and scar formation. The C-arm was positioned so that an oblique view of the neural foramen as noted above was visualized. The soft tissues overlying this structure were infiltrated with 2-3 cc. of 1% Lidocaine without Epinephrine. A 22 gauge 3.5 inch spinal needle was inserted toward the target using a trajectory view along the fluoroscope beam.  Under AP and lateral visualization, the needle was advanced so it did not puncture dura. Biplanar projections were used to confirm position. Aspiration was confirmed to be negative for CSF and/or blood.   A 1-2 cc. volume of omnipaque contrast was injected at this

## 2023-10-13 DIAGNOSIS — M54.17 LUMBOSACRAL RADICULITIS: ICD-10-CM

## 2023-11-01 ENCOUNTER — OFFICE VISIT (OUTPATIENT)
Dept: PHYSICAL MEDICINE AND REHAB | Age: 88
End: 2023-11-01
Payer: MEDICARE

## 2023-11-01 VITALS
DIASTOLIC BLOOD PRESSURE: 76 MMHG | HEIGHT: 69 IN | HEART RATE: 71 BPM | SYSTOLIC BLOOD PRESSURE: 117 MMHG | BODY MASS INDEX: 31.84 KG/M2 | WEIGHT: 215 LBS

## 2023-11-01 DIAGNOSIS — R26.9 IMPAIRED GAIT: ICD-10-CM

## 2023-11-01 DIAGNOSIS — G89.29 ACUTE EXACERBATION OF CHRONIC LOW BACK PAIN: ICD-10-CM

## 2023-11-01 DIAGNOSIS — M54.50 ACUTE EXACERBATION OF CHRONIC LOW BACK PAIN: ICD-10-CM

## 2023-11-01 DIAGNOSIS — M53.3 SACROILIAC JOINT PAIN: Primary | ICD-10-CM

## 2023-11-01 PROCEDURE — 1123F ACP DISCUSS/DSCN MKR DOCD: CPT | Performed by: PHYSICAL MEDICINE & REHABILITATION

## 2023-11-01 PROCEDURE — 99214 OFFICE O/P EST MOD 30 MIN: CPT | Performed by: PHYSICAL MEDICINE & REHABILITATION

## 2023-11-01 RX ORDER — SODIUM CHLORIDE 0.9 % (FLUSH) 0.9 %
5-40 SYRINGE (ML) INJECTION EVERY 12 HOURS SCHEDULED
OUTPATIENT
Start: 2023-11-01

## 2023-11-01 RX ORDER — SODIUM CHLORIDE 9 MG/ML
INJECTION, SOLUTION INTRAVENOUS PRN
OUTPATIENT
Start: 2023-11-01

## 2023-11-01 RX ORDER — SODIUM CHLORIDE 0.9 % (FLUSH) 0.9 %
5-40 SYRINGE (ML) INJECTION PRN
OUTPATIENT
Start: 2023-11-01

## 2023-11-01 NOTE — PROGRESS NOTES
Mercedes Lara, 1300 [facility] Physical Medicine and Rehabilitation  [address] [facility]. [facility] [address], [address] [place] [address]  Phone: [phone]  Fax: [phone]    PCP: Cayla Gutierrez MD  Date of visit: 11/1/23    Chief Complaint   Patient presents with    Back Pain     F/u KAREY       Interval:   Patient presents today for follow up visit after undergoing left L4-5 TFESI. He reports no relief at all with the injection. He has continued left sided low back pain. It is worse with weight bearing from seated to standing. He has figured out that if he holds pressure down on his left thigh while walking up the steps, this helps with some of his pain. The pain is described as achy. The pain is rated 8/10. It does not radiate to leg. There is no associated numbness/tingling. There is no weakness. There is no bowel/bladder changes. Pain is affecting his function. Tried tramadol and became altered. The prior workup has included: Xray, MRI     The prior treatment has included:  PT: in past  Chiropractic: none    Modalities: none   OTC Tylenol: yes  NSAIDS: CAD. Toradol IM  Opioids: tramadol- made him confused   Membrane stabilizers: neurontin   Muscle relaxers: none    Previous injections: bilateral L4-5 and L5-S1 facet injections- 90% relief   Bilateral MBB L4-5 and L5-S1- 100% relief twice  Bilateral L4-5 and L5-S1 RFA  Bilateral SI  Bilateral L4-5, L5-S1 MBB #2 and RFA   Left L4-5 TFESI  Previous surgery at this site: none    Allergies   Allergen Reactions    Tramadol Hcl Hallucinations       Current Outpatient Medications   Medication Sig Dispense Refill    levothyroxine (SYNTHROID) 50 MCG tablet TAKE ONE TABLET BY MOUTH DAILY 90 tablet 1    finasteride (PROSCAR) 5 MG tablet Take 1 tablet by mouth daily 90 tablet 1    gabapentin (NEURONTIN) 100 MG capsule Take 1 capsule by mouth 2 times daily.  180 capsule 1    tamsulosin (FLOMAX) 0.4 MG capsule Take 1 capsule by mouth daily 90 capsule 1

## 2023-11-01 NOTE — H&P
BILATERAL SACROILIAC JOINT INJECTION UNDER X-RAY GUIDANCE performed by Glory Peterson DO at [address]  3/27/13    off-pump cabg x 3- Dr. Medrano Pouch CATH LAB PROCEDURE  3/26/13    FRACTURE SURGERY      NERVE BLOCK Bilateral 02/28/2019    lumbar intra-articular facet    NERVE BLOCK Bilateral 4/29/2021    BILATERAL MEDIAL BRANCH BLOCK AT L4-5 AND L5-S1 (CPT 32181) performed by Glory Peterson DO at [facility] Bilateral 05/20/2021    B/L medial branch blocks at L4-5, L5-S1    OTHER SURGICAL HISTORY N/A 04/29/2021    BILATERAL MEDIAL BRANCH BLOCK L4-5 AND L5-S1     OTHER SURGICAL HISTORY Right 06/24/2021    L4-5, L5-S-1 : Medial branch neurolysisi radiofrequency ablation    PAIN MANAGEMENT PROCEDURE Bilateral 5/20/2021    BILATERAL MEDIAL BRANCH BLOCKS AT L405 AND L5-S1 (CPT 16691) performed by Glory Peterson DO at [address] Right 6/24/2021    FLUOROSCOPIC-GUIDED RIGHT LUMBAR MEDIAL BRANCH NEUROLYSIS (RADIOFREQUENCY ABLATION) AT LEVELS L4-5, L5-S1 performed by Glory Peterson DO at [address] Left 7/8/2021    FLUOROSCOPIC-GUIDED LEFT LUMBAR MEDIAL BRANCH NEUROLYSIS (RADIOFREQUENCY ABLATION) AT LEVELS L4-5, L5-S1 (CPT 08272) performed by Glory Peterson DO at [address] Bilateral 5/25/2023    Bilateral medial branch blocks at L4-5 and L5-S1 #1 performed by Glory Peterson DO at [address] Bilateral 6/22/2023    Bilateral medical branch blocks at L4-5 and L5-S1 #2 performed by Glory Peterson DO at [address] Right 7/27/2023    Fluoroscopic-guided Right lumbar medial branch neurolysis ( radiofrequency ablation) at levels L4-5, L5-S1 performed by Glory Peterson DO at [facility]

## 2023-11-02 ENCOUNTER — TELEPHONE (OUTPATIENT)
Dept: PHYSICAL MEDICINE AND REHAB | Age: 88
End: 2023-11-02

## 2023-11-02 ENCOUNTER — PREP FOR PROCEDURE (OUTPATIENT)
Dept: PHYSICAL MEDICINE AND REHAB | Age: 88
End: 2023-11-02

## 2023-11-02 NOTE — TELEPHONE ENCOUNTER
Mayte Huffman Nurse reviewer called back and case was approved  auth number [id number] from Nov 9 2023 - Nov 22 2023

## 2023-11-02 NOTE — TELEPHONE ENCOUNTER
Called and spoke to [facility] prior [facility] [address] for CPT code 62573, clinicals faxed to  will await decision, call ref number [id number] Snow 11/2/2023 12:51 pm

## 2023-11-06 ENCOUNTER — TELEPHONE (OUTPATIENT)
Dept: PHYSICAL MEDICINE AND REHAB | Age: 88
End: 2023-11-06

## 2023-11-06 NOTE — TELEPHONE ENCOUNTER
----- Message from Ashwini Shaw DO sent at 11/6/2023  8:58 AM EST -----  Please call patient with x-ray results. 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 Okay to proceed with injection.

## 2023-11-09 ENCOUNTER — HOSPITAL ENCOUNTER (OUTPATIENT)
Age: 88
Setting detail: OUTPATIENT SURGERY
Discharge: HOME | End: 2023-11-09
Attending: PHYSICAL MEDICINE & REHABILITATION | Admitting: PHYSICAL MEDICINE & REHABILITATION
Payer: MEDICARE

## 2023-11-09 ENCOUNTER — HOSPITAL ENCOUNTER (OUTPATIENT)
Dept: OPERATING ROOM | Age: 88
Setting detail: OUTPATIENT SURGERY
Discharge: HOME | End: 2023-11-09
Attending: PHYSICAL MEDICINE & REHABILITATION
Payer: MEDICARE

## 2023-11-09 VITALS
SYSTOLIC BLOOD PRESSURE: 112 MMHG | WEIGHT: 215 LBS | HEART RATE: 65 BPM | TEMPERATURE: 97.7 F | RESPIRATION RATE: 14 BRPM | OXYGEN SATURATION: 96 % | HEIGHT: 69 IN | BODY MASS INDEX: 31.84 KG/M2 | DIASTOLIC BLOOD PRESSURE: 58 MMHG

## 2023-11-09 DIAGNOSIS — M46.1 SACROILIITIS (HCC): ICD-10-CM

## 2023-11-09 PROCEDURE — 27096 INJECT SACROILIAC JOINT: CPT | Performed by: PHYSICAL MEDICINE & REHABILITATION

## 2023-11-09 PROCEDURE — 6360000002 HC RX W HCPCS: Performed by: PHYSICAL MEDICINE & REHABILITATION

## 2023-11-09 PROCEDURE — 2500000003 HC RX 250 WO HCPCS: Performed by: PHYSICAL MEDICINE & REHABILITATION

## 2023-11-09 PROCEDURE — 6360000004 HC RX CONTRAST MEDICATION: Performed by: PHYSICAL MEDICINE & REHABILITATION

## 2023-11-09 PROCEDURE — 3600000005 HC SURGERY LEVEL 5 BASE: Performed by: PHYSICAL MEDICINE & REHABILITATION

## 2023-11-09 PROCEDURE — 7100000010 HC PHASE II RECOVERY - FIRST 15 MIN: Performed by: PHYSICAL MEDICINE & REHABILITATION

## 2023-11-09 PROCEDURE — 2709999900 HC NON-CHARGEABLE SUPPLY: Performed by: PHYSICAL MEDICINE & REHABILITATION

## 2023-11-09 PROCEDURE — 7100000011 HC PHASE II RECOVERY - ADDTL 15 MIN: Performed by: PHYSICAL MEDICINE & REHABILITATION

## 2023-11-09 RX ORDER — SODIUM CHLORIDE 9 MG/ML
INJECTION, SOLUTION INTRAVENOUS PRN
Status: DISCONTINUED | OUTPATIENT
Start: 2023-11-09 | End: 2023-11-09 | Stop reason: HOSPADM

## 2023-11-09 RX ORDER — SODIUM CHLORIDE 0.9 % (FLUSH) 0.9 %
5-40 SYRINGE (ML) INJECTION PRN
Status: DISCONTINUED | OUTPATIENT
Start: 2023-11-09 | End: 2023-11-09 | Stop reason: HOSPADM

## 2023-11-09 RX ORDER — LIDOCAINE HYDROCHLORIDE 10 MG/ML
INJECTION, SOLUTION EPIDURAL; INFILTRATION; INTRACAUDAL; PERINEURAL PRN
Status: DISCONTINUED | OUTPATIENT
Start: 2023-11-09 | End: 2023-11-09 | Stop reason: ALTCHOICE

## 2023-11-09 RX ORDER — SODIUM CHLORIDE 0.9 % (FLUSH) 0.9 %
5-40 SYRINGE (ML) INJECTION EVERY 12 HOURS SCHEDULED
Status: DISCONTINUED | OUTPATIENT
Start: 2023-11-09 | End: 2023-11-09 | Stop reason: HOSPADM

## 2023-11-09 ASSESSMENT — PAIN - FUNCTIONAL ASSESSMENT: PAIN_FUNCTIONAL_ASSESSMENT: 0-10

## 2023-11-09 ASSESSMENT — PAIN DESCRIPTION - DESCRIPTORS: DESCRIPTORS: ACHING

## 2023-11-09 NOTE — H&P
Robbin Villalobos, 1300 [facility] Physical Medicine and Rehabilitation  [address] [facility] [address], [address] [place] [address]  Phone: [phone]  Fax: [phone]     PCP: Kaela Pino MD  Date of visit: 11/1/23          Chief Complaint   Patient presents with    Back Pain       F/u KAREY         Interval:   Patient presents today for follow up visit after undergoing left L4-5 TFESI. He reports no relief at all with the injection. He has continued left sided low back pain. It is worse with weight bearing from seated to standing. He has figured out that if he holds pressure down on his left thigh while walking up the steps, this helps with some of his pain. The pain is described as achy. The pain is rated 8/10. It does not radiate to leg. There is no associated numbness/tingling. There is no weakness. There is no bowel/bladder changes. Pain is affecting his function. Tried tramadol and became altered. The prior workup has included: Xray, MRI      The prior treatment has included:  PT: in past  Chiropractic: none    Modalities: none   OTC Tylenol: yes  NSAIDS: CAD. Toradol IM  Opioids: tramadol- made him confused   Membrane stabilizers: neurontin   Muscle relaxers: none    Previous injections: bilateral L4-5 and L5-S1 facet injections- 90% relief   Bilateral MBB L4-5 and L5-S1- 100% relief twice  Bilateral L4-5 and L5-S1 RFA  Bilateral SI  Bilateral L4-5, L5-S1 MBB #2 and RFA   Left L4-5 TFESI  Previous surgery at this site: none          Allergies   Allergen Reactions    Tramadol Hcl Hallucinations                Current Outpatient Medications   Medication Sig Dispense Refill    levothyroxine (SYNTHROID) 50 MCG tablet TAKE ONE TABLET BY MOUTH DAILY 90 tablet 1    finasteride (PROSCAR) 5 MG tablet Take 1 tablet by mouth daily 90 tablet 1    gabapentin (NEURONTIN) 100 MG capsule Take 1 capsule by mouth 2 times daily.  180 capsule 1    tamsulosin (FLOMAX) 0.4 MG capsule Take 1 capsule by mouth

## 2023-11-09 NOTE — OP NOTE
SACROILIAC JOINT ARTHROGRAM AND STEROID INJECTION     WITH FLUOROSCOPIC GUIDANCE    Patient: Darrell Mueller                                                    MRN: [id number]  : 1935                                             Date of procedure: 2023    Physician Performing Procedure: Robbin Villalobos DO     Diagnosis: sacroiliac joint arthritis     Injectate: A total of 2mL, consisting of 1mL of Kenalog (40mg/mL), the remainder comprised of 2% Lidocaine. Levels Treated: left Sacroiliac Joint    Approach: Posterior     Improvement after today's procedure: As per nursing record. Comments: none    Pre-procedural evaluation: The patient was examined today just prior to performing the procedure listed above. The patient's heart rate was normal and rhythm was regular, lungs were clear to auscultation bilaterally, peripheral pulses were intact in the lower limbs, strength and sensation were preserved in the lower limbs, and reflexes were symmetric in the lower limbs. Procedure: The patient was prepped and draped in a sterile fashion in the prone position after informed consent was signed and all patient questions were answered including the risks, benefits, alternative treatment options, and prognosis. The risks include but are not limited to infection, allergic reaction, nerve damage, paralysis, epidural hematoma, syncope, headache, respiratory or cardiac arrest, and scar formation. The fluoroscopic C-arm was positioned for optimal visualization of the sacroiliac (SI) joint. The inferior portion of the above SI joint was localized under fluoroscopic visualization and 3cc of 1% Lidocaine without Epinephrine was utilized for soft tissue local anesthesia. A 22 gauge spinal needle was inserted into the fluoroscopically hyperlucent region within the SI joint. A 0.5 cc. volume of Isovue was injected into the joint and a partial arthrogram flow pattern was obtained.   Spot films were

## 2023-11-28 ENCOUNTER — OFFICE VISIT (OUTPATIENT)
Dept: PHYSICAL MEDICINE AND REHAB | Age: 88
End: 2023-11-28
Payer: MEDICARE

## 2023-11-28 VITALS
DIASTOLIC BLOOD PRESSURE: 60 MMHG | BODY MASS INDEX: 30.07 KG/M2 | SYSTOLIC BLOOD PRESSURE: 120 MMHG | HEIGHT: 69 IN | HEART RATE: 59 BPM | WEIGHT: 203 LBS

## 2023-11-28 DIAGNOSIS — R26.9 IMPAIRED GAIT: ICD-10-CM

## 2023-11-28 DIAGNOSIS — M47.818 ARTHRITIS OF LEFT SACROILIAC JOINT: Primary | ICD-10-CM

## 2023-11-28 PROCEDURE — 99213 OFFICE O/P EST LOW 20 MIN: CPT | Performed by: PHYSICAL MEDICINE & REHABILITATION

## 2023-11-28 PROCEDURE — 1123F ACP DISCUSS/DSCN MKR DOCD: CPT | Performed by: PHYSICAL MEDICINE & REHABILITATION

## 2023-11-28 NOTE — PROGRESS NOTES
PAIN MANAGEMENT PROCEDURE Left 10/12/2023    Left L4-5 transforaminal epidural steroid injection performed by Judson Bell DO at [address]         Family History   Problem Relation Age of Onset    Heart Disease Mother     Heart Disease Father     Heart Disease Brother        Social History     Tobacco Use    Smoking status: Former     Years: 28     Types: Cigarettes     Quit date: 3/26/1987     Years since quittin.7    Smokeless tobacco: Former   Vaping Use    Vaping Use: Never used   Substance Use Topics    Alcohol use: No     Alcohol/week: 0.0 standard drinks of alcohol    Drug use: No          Functional Status: The patient is able to ambulate and perform activities of daily living with the use of an assistive device. Occupation: The patient is currently retired       ROS:   Constitutional: Denies fevers, chills, night sweats, unintentional weight loss     Skin: Denies rash or skin changes     Eyes: Denies vision changes    Ears/Nose/Throat: Denies nasal congestion or sore throat     Respiratory: Denies SOB or cough     Cardiovascular: Denies CP, palpitations, edema      Gastrointestinal: Denies abdominal pain,  N/V, constipation, or diarrhea    Genitourinary: Denies urinary symptoms    Neurologic: See HPI.     MSK: See HPI. Psychiatric: Denies sleep disturbance, anxiety, depression    Hematologic/Lymphatic/Immunologic: Denies bruising       Physical Exam:   Blood pressure 120/60, pulse 59, height 1.753 m (5' 9\"), weight 92.1 kg (203 lb). General: well developed and well nourished in no acute distress. Body habitus is obese  HEENT: No rhinorrhea, sneezing, yawning, or lacrimation. No scleral icterus or conjunctival injection. Resp: symmetrical chest expansion, unlabored breathing, respirations unlabored. CV: Heart rate is regular. Peripheral pulses are palpable  Lymph: No visible regional lymphadenopathy. Skin: No rashes or ecchymosis. Normal turgor.

## 2024-03-04 DIAGNOSIS — N40.1 BENIGN PROSTATIC HYPERPLASIA WITH URINARY FREQUENCY: ICD-10-CM

## 2024-03-04 DIAGNOSIS — R35.0 BENIGN PROSTATIC HYPERPLASIA WITH URINARY FREQUENCY: ICD-10-CM

## 2024-03-04 NOTE — TELEPHONE ENCOUNTER
Pt called for refills    Last Appointment:  9/6/2023  Future Appointments   Date Time Provider Department Center   3/7/2024  8:00 AM Jaxson Felix MD [facility]

## 2024-03-06 DIAGNOSIS — R35.0 BENIGN PROSTATIC HYPERPLASIA WITH URINARY FREQUENCY: ICD-10-CM

## 2024-03-06 DIAGNOSIS — N40.1 BENIGN PROSTATIC HYPERPLASIA WITH URINARY FREQUENCY: ICD-10-CM

## 2024-03-06 NOTE — TELEPHONE ENCOUNTER
Pt called to request refills    Last Appointment:  9/6/2023  Future Appointments   Date Time Provider Department Center   3/7/2024  8:00 AM Jaxson Felix MD [facility]

## 2024-03-07 RX ORDER — FINASTERIDE 5 MG/1
5 TABLET, FILM COATED ORAL DAILY
Qty: 90 TABLET | Refills: 0 | Status: SHIPPED | OUTPATIENT
Start: 2024-03-07

## 2024-03-10 RX ORDER — FINASTERIDE 5 MG/1
5 TABLET, FILM COATED ORAL DAILY
Qty: 90 TABLET | Refills: 1 | OUTPATIENT
Start: 2024-03-10

## 2024-03-18 ENCOUNTER — TELEPHONE (OUTPATIENT)
Dept: FAMILY MEDICINE CLINIC | Age: 89
End: 2024-03-18

## 2024-03-18 NOTE — TELEPHONE ENCOUNTER
Called and spoke to pt.  Pt was wondering if he needed to fast for his A1C.  Pt advised he did not.

## 2024-03-18 NOTE — TELEPHONE ENCOUNTER
----- Message from Lakesha Ackerman sent at 3/18/2024  1:50 PM EDT -----  Subject: Message to Provider    QUESTIONS  Information for Provider? please contact patient to inform about   fasting/meds for his 3/26/2024 appointment  ---------------------------------------------------------------------------  --------------  CALL BACK INFO  [phone]; OK to leave message on voicemail  ---------------------------------------------------------------------------  --------------  SCRIPT ANSWERS  Relationship to Patient? Self

## 2024-03-26 ENCOUNTER — OFFICE VISIT (OUTPATIENT)
Dept: FAMILY MEDICINE CLINIC | Age: 89
End: 2024-03-26
Payer: MEDICARE

## 2024-03-26 VITALS
SYSTOLIC BLOOD PRESSURE: 114 MMHG | RESPIRATION RATE: 20 BRPM | HEART RATE: 60 BPM | WEIGHT: 213 LBS | BODY MASS INDEX: 31.55 KG/M2 | DIASTOLIC BLOOD PRESSURE: 62 MMHG | OXYGEN SATURATION: 97 % | HEIGHT: 69 IN

## 2024-03-26 DIAGNOSIS — Z00.00 MEDICARE ANNUAL WELLNESS VISIT, SUBSEQUENT: Primary | ICD-10-CM

## 2024-03-26 DIAGNOSIS — Z12.5 ENCOUNTER FOR SCREENING FOR MALIGNANT NEOPLASM OF PROSTATE: ICD-10-CM

## 2024-03-26 DIAGNOSIS — E11.9 TYPE 2 DIABETES MELLITUS WITHOUT COMPLICATION, WITHOUT LONG-TERM CURRENT USE OF INSULIN (HCC): ICD-10-CM

## 2024-03-26 DIAGNOSIS — M46.1 SACROILIITIS (HCC): ICD-10-CM

## 2024-03-26 DIAGNOSIS — E03.9 ACQUIRED HYPOTHYROIDISM: ICD-10-CM

## 2024-03-26 DIAGNOSIS — R35.0 BENIGN PROSTATIC HYPERPLASIA WITH URINARY FREQUENCY: ICD-10-CM

## 2024-03-26 DIAGNOSIS — N40.1 BENIGN PROSTATIC HYPERPLASIA WITH URINARY FREQUENCY: ICD-10-CM

## 2024-03-26 LAB — HBA1C MFR BLD: 6 %

## 2024-03-26 PROCEDURE — 83036 HEMOGLOBIN GLYCOSYLATED A1C: CPT | Performed by: FAMILY MEDICINE

## 2024-03-26 PROCEDURE — G0439 PPPS, SUBSEQ VISIT: HCPCS | Performed by: FAMILY MEDICINE

## 2024-03-26 PROCEDURE — 1123F ACP DISCUSS/DSCN MKR DOCD: CPT | Performed by: FAMILY MEDICINE

## 2024-03-26 PROCEDURE — 3044F HG A1C LEVEL LT 7.0%: CPT | Performed by: FAMILY MEDICINE

## 2024-03-26 RX ORDER — LEVOTHYROXINE SODIUM 0.05 MG/1
TABLET ORAL
Qty: 90 TABLET | Refills: 1 | Status: SHIPPED | OUTPATIENT
Start: 2024-03-26

## 2024-03-26 RX ORDER — FINASTERIDE 5 MG/1
5 TABLET, FILM COATED ORAL DAILY
Qty: 90 TABLET | Refills: 1 | Status: SHIPPED | OUTPATIENT
Start: 2024-03-26

## 2024-03-26 RX ORDER — TAMSULOSIN HYDROCHLORIDE 0.4 MG/1
0.4 CAPSULE ORAL DAILY
Qty: 90 CAPSULE | Refills: 1 | Status: SHIPPED | OUTPATIENT
Start: 2024-03-26

## 2024-03-26 RX ORDER — GABAPENTIN 100 MG/1
100 CAPSULE ORAL 2 TIMES DAILY
Qty: 180 CAPSULE | Refills: 1 | Status: SHIPPED | OUTPATIENT
Start: 2024-03-26 | End: 2025-03-26

## 2024-03-26 RX ORDER — LANCETS
EACH MISCELLANEOUS
Qty: 50 EACH | Refills: 12 | Status: SHIPPED | OUTPATIENT
Start: 2024-03-26

## 2024-03-26 SDOH — ECONOMIC STABILITY: INCOME INSECURITY: HOW HARD IS IT FOR YOU TO PAY FOR THE VERY BASICS LIKE FOOD, HOUSING, MEDICAL CARE, AND HEATING?: NOT HARD AT ALL

## 2024-03-26 SDOH — ECONOMIC STABILITY: FOOD INSECURITY: WITHIN THE PAST 12 MONTHS, THE FOOD YOU BOUGHT JUST DIDN'T LAST AND YOU DIDN'T HAVE MONEY TO GET MORE.: NEVER TRUE

## 2024-03-26 SDOH — ECONOMIC STABILITY: HOUSING INSECURITY
IN THE LAST 12 MONTHS, WAS THERE A TIME WHEN YOU DID NOT HAVE A STEADY PLACE TO SLEEP OR SLEPT IN A SHELTER (INCLUDING NOW)?: NO

## 2024-03-26 SDOH — ECONOMIC STABILITY: FOOD INSECURITY: WITHIN THE PAST 12 MONTHS, YOU WORRIED THAT YOUR FOOD WOULD RUN OUT BEFORE YOU GOT MONEY TO BUY MORE.: NEVER TRUE

## 2024-03-26 ASSESSMENT — PATIENT HEALTH QUESTIONNAIRE - PHQ9
1. LITTLE INTEREST OR PLEASURE IN DOING THINGS: NOT AT ALL
SUM OF ALL RESPONSES TO PHQ QUESTIONS 1-9: 0
SUM OF ALL RESPONSES TO PHQ QUESTIONS 1-9: 0
SUM OF ALL RESPONSES TO PHQ9 QUESTIONS 1 & 2: 0
2. FEELING DOWN, DEPRESSED OR HOPELESS: NOT AT ALL
SUM OF ALL RESPONSES TO PHQ QUESTIONS 1-9: 0
SUM OF ALL RESPONSES TO PHQ QUESTIONS 1-9: 0

## 2024-03-26 ASSESSMENT — LIFESTYLE VARIABLES: HOW OFTEN DO YOU HAVE A DRINK CONTAINING ALCOHOL: MONTHLY OR LESS

## 2024-03-26 NOTE — PATIENT INSTRUCTIONS

## 2024-06-24 LAB — DIABETIC RETINOPATHY: NEGATIVE

## 2024-07-01 ENCOUNTER — OFFICE VISIT (OUTPATIENT)
Dept: PHYSICAL MEDICINE AND REHAB | Age: 89
End: 2024-07-01
Payer: MEDICARE

## 2024-07-01 VITALS
SYSTOLIC BLOOD PRESSURE: 118 MMHG | HEART RATE: 64 BPM | WEIGHT: 213 LBS | DIASTOLIC BLOOD PRESSURE: 75 MMHG | BODY MASS INDEX: 31.45 KG/M2

## 2024-07-01 DIAGNOSIS — M47.819 FACET ARTHROPATHY: ICD-10-CM

## 2024-07-01 DIAGNOSIS — G89.29 ACUTE EXACERBATION OF CHRONIC LOW BACK PAIN: ICD-10-CM

## 2024-07-01 DIAGNOSIS — M47.816 LUMBAR SPONDYLOSIS: Primary | ICD-10-CM

## 2024-07-01 DIAGNOSIS — M54.50 ACUTE EXACERBATION OF CHRONIC LOW BACK PAIN: ICD-10-CM

## 2024-07-01 PROCEDURE — 1123F ACP DISCUSS/DSCN MKR DOCD: CPT | Performed by: PHYSICAL MEDICINE & REHABILITATION

## 2024-07-01 PROCEDURE — 99214 OFFICE O/P EST MOD 30 MIN: CPT | Performed by: PHYSICAL MEDICINE & REHABILITATION

## 2024-07-01 RX ORDER — SODIUM CHLORIDE 0.9 % (FLUSH) 0.9 %
5-40 SYRINGE (ML) INJECTION PRN
OUTPATIENT
Start: 2024-07-01

## 2024-07-01 RX ORDER — SODIUM CHLORIDE 0.9 % (FLUSH) 0.9 %
5-40 SYRINGE (ML) INJECTION EVERY 12 HOURS SCHEDULED
OUTPATIENT
Start: 2024-07-01

## 2024-07-01 RX ORDER — SODIUM CHLORIDE 9 MG/ML
INJECTION, SOLUTION INTRAVENOUS PRN
OUTPATIENT
Start: 2024-07-01

## 2024-07-01 NOTE — H&P
Gladys Christine, DO  [facility]  [address] [place] [address]  Phone: [phone]  Fax: [phone]    PCP: Jaxson Felix MD  Date of visit: 7/1/24    Chief Complaint   Patient presents with    Back Pain     F/U back pain discuss procedure       Interval:   Patient presents today for office visit today regarding low back pain. He reports pain score using VAS 8/10. Worse with standing and walking. Better with sitting down. The pain is described as achy and located in the low back without radiation to the legs. There is no associated numbness/tingling. There is no weakness. There is no bowel/bladder changes. No bowel/bladder changes. Using SI joint brace. Recall he had lumbar RFA a year ago with good relief. He feels this pain is similar to facet pain.     The prior workup has included: Xray, MRI L spine, XR SI joint    The prior treatment has included:  PT: in past  Chiropractic: none    Modalities: none   OTC Tylenol: yes  NSAIDS: CAD. Toradol IM  Opioids: tramadol- made him confused   Membrane stabilizers: neurontin   Muscle relaxers: none    Previous injections: bilateral L4-5 and L5-S1 facet injections- 90% relief   Bilateral MBB L4-5 and L5-S1- 100% relief twice  Bilateral L4-5 and L5-S1 RFA  Bilateral SI  Bilateral L4-5, L5-S1 MBB #2 and RFA   Left L4-5 TFESI  Left SI joint injection  Previous surgery at this site: none    Allergies   Allergen Reactions    Tramadol Hcl Hallucinations       Current Outpatient Medications   Medication Sig Dispense Refill    ONE TOUCH ULTRASOFT LANCETS MISC Check blood sugar qam 50 each 12    gabapentin (NEURONTIN) 100 MG capsule Take 1 capsule by mouth 2 times daily. 180 capsule 1    levothyroxine (SYNTHROID) 50 MCG tablet TAKE ONE TABLET BY MOUTH DAILY 90 tablet 1    tamsulosin (FLOMAX) 0.4 MG capsule Take 1 capsule by mouth daily 90 capsule 1    finasteride (PROSCAR) 5 MG tablet Take 1

## 2024-07-01 NOTE — PROGRESS NOTES
Gladys Christine, DO  [facility]  [address] [place] [address]  Phone: [phone]  Fax: [phone]    PCP: Jaxson Felix MD  Date of visit: 7/1/24    Chief Complaint   Patient presents with    Back Pain     F/U back pain discuss procedure       Interval:   Patient presents today for office visit today regarding low back pain. He reports pain score using VAS 8/10. Worse with standing and walking. Better with sitting down. The pain is described as achy and located in the low back without radiation to the legs. There is no associated numbness/tingling. There is no weakness. There is no bowel/bladder changes. No bowel/bladder changes. Using SI joint brace. Recall he had lumbar RFA a year ago with good relief. He feels this pain is similar to facet pain.     The prior workup has included: Xray, MRI L spine, XR SI joint    The prior treatment has included:  PT: in past  Chiropractic: none    Modalities: none   OTC Tylenol: yes  NSAIDS: CAD. Toradol IM  Opioids: tramadol- made him confused   Membrane stabilizers: neurontin   Muscle relaxers: none    Previous injections: bilateral L4-5 and L5-S1 facet injections- 90% relief   Bilateral MBB L4-5 and L5-S1- 100% relief twice  Bilateral L4-5 and L5-S1 RFA  Bilateral SI  Bilateral L4-5, L5-S1 MBB #2 and RFA   Left L4-5 TFESI  Left SI joint injection  Previous surgery at this site: none    Allergies   Allergen Reactions    Tramadol Hcl Hallucinations       Current Outpatient Medications   Medication Sig Dispense Refill    ONE TOUCH ULTRASOFT LANCETS MISC Check blood sugar qam 50 each 12    gabapentin (NEURONTIN) 100 MG capsule Take 1 capsule by mouth 2 times daily. 180 capsule 1    levothyroxine (SYNTHROID) 50 MCG tablet TAKE ONE TABLET BY MOUTH DAILY 90 tablet 1    tamsulosin (FLOMAX) 0.4 MG capsule Take 1 capsule by mouth daily 90 capsule 1    finasteride (PROSCAR) 5 MG tablet Take 1

## 2024-07-11 ENCOUNTER — TELEPHONE (OUTPATIENT)
Dept: PHYSICAL MEDICINE AND REHAB | Age: 89
End: 2024-07-11

## 2024-07-11 DIAGNOSIS — M47.816 LUMBAR SPONDYLOSIS: Primary | ICD-10-CM

## 2024-07-11 NOTE — TELEPHONE ENCOUNTER
Margoth PADILLA From [facility] ( [employer]) insurance called and stated a physician reviewer looked at claim for MBB and this will be a window for a peer to peer. They are stating patient does not meet criteria to have another MBB since he already had a RFA within 24 months. It has to be past the 24 month emma. We can call 1 [phone] to set up peer to peer, let me know days and times you would like to do this, thanks

## 2024-07-15 NOTE — TELEPHONE ENCOUNTER
Called and spoke with Brigitte harris [facility] to schedule a peer to peer she stated that the case closed on 7-11-24 we can either resubmit after 60 days or try to do appeal through [employer].  Please advise.

## 2024-07-15 NOTE — TELEPHONE ENCOUNTER
Called and spoke with the patient and informed him that his MBB was denied and I am going to start an appeal and that can take up to 60 days.  Patient is aware and voiced understanding.

## 2024-07-17 NOTE — TELEPHONE ENCOUNTER
Called and spoke with the patient wife Verena and informed her that the physician has changed her mind and we are going to do another RFA instead and I will call him once we get the RFA approved.  Patient wife is aware and voiced understanding.

## 2024-07-23 ENCOUNTER — TELEPHONE (OUTPATIENT)
Dept: PHYSICAL MEDICINE AND REHAB | Age: 89
End: 2024-07-23

## 2024-07-23 NOTE — TELEPHONE ENCOUNTER
Kanwal from [employer] called and stated that the right RFA was denied do to had RFA done with in 12-24 months can do peer to peer at [phone] case ID [id number].  Please advise.

## 2024-07-29 ENCOUNTER — PREP FOR PROCEDURE (OUTPATIENT)
Dept: PHYSICAL MEDICINE AND REHAB | Age: 89
End: 2024-07-29

## 2024-07-29 DIAGNOSIS — M47.816 LUMBAR SPONDYLOSIS: ICD-10-CM

## 2024-07-29 NOTE — TELEPHONE ENCOUNTER
Called and spoke with Jyotsna BURGOS from [place] [employer] to get the authorization extended to 9-12-24.  I will get the extension of the day within the next 24 hrs.  Approval authorization  [id number] ([id number]).

## 2024-07-29 NOTE — TELEPHONE ENCOUNTER
Called and spoke with the patient to schedule him for his Right RFA on 8-22-24 and left RFA on 9-5-24.  Patient was informed to not take aspirin 81 mg 24 hrs before the procedure each procedure and the surgery center will call him the day before each procedure to let him know of the times of each procedure after 3 pm.  Patient is aware and voiced understanding.

## 2024-08-21 RX ORDER — SODIUM CHLORIDE 9 MG/ML
INJECTION, SOLUTION INTRAVENOUS PRN
Status: CANCELLED | OUTPATIENT
Start: 2024-08-21

## 2024-08-21 RX ORDER — SODIUM CHLORIDE 0.9 % (FLUSH) 0.9 %
5-40 SYRINGE (ML) INJECTION EVERY 12 HOURS SCHEDULED
Status: CANCELLED | OUTPATIENT
Start: 2024-08-21

## 2024-08-21 RX ORDER — SODIUM CHLORIDE 0.9 % (FLUSH) 0.9 %
5-40 SYRINGE (ML) INJECTION PRN
Status: CANCELLED | OUTPATIENT
Start: 2024-08-21

## 2024-08-22 ENCOUNTER — HOSPITAL ENCOUNTER (OUTPATIENT)
Age: 89
Setting detail: OUTPATIENT SURGERY
Discharge: HOME | End: 2024-08-22
Attending: PHYSICAL MEDICINE & REHABILITATION | Admitting: PHYSICAL MEDICINE & REHABILITATION
Payer: MEDICARE

## 2024-08-22 ENCOUNTER — HOSPITAL ENCOUNTER (OUTPATIENT)
Dept: OPERATING ROOM | Age: 89
Setting detail: OUTPATIENT SURGERY
Discharge: HOME | End: 2024-08-22
Attending: PHYSICAL MEDICINE & REHABILITATION
Payer: MEDICARE

## 2024-08-22 VITALS
HEART RATE: 82 BPM | WEIGHT: 213 LBS | SYSTOLIC BLOOD PRESSURE: 107 MMHG | DIASTOLIC BLOOD PRESSURE: 60 MMHG | RESPIRATION RATE: 14 BRPM | BODY MASS INDEX: 31.55 KG/M2 | OXYGEN SATURATION: 96 % | TEMPERATURE: 97.7 F | HEIGHT: 69 IN

## 2024-08-22 DIAGNOSIS — M47.896 OTHER OSTEOARTHRITIS OF SPINE, LUMBAR REGION: ICD-10-CM

## 2024-08-22 PROCEDURE — 7100000011 HC PHASE II RECOVERY - ADDTL 15 MIN: Performed by: PHYSICAL MEDICINE & REHABILITATION

## 2024-08-22 PROCEDURE — 3600000005 HC SURGERY LEVEL 5 BASE: Performed by: PHYSICAL MEDICINE & REHABILITATION

## 2024-08-22 PROCEDURE — 2500000003 HC RX 250 WO HCPCS: Performed by: PHYSICAL MEDICINE & REHABILITATION

## 2024-08-22 PROCEDURE — 2709999900 HC NON-CHARGEABLE SUPPLY: Performed by: PHYSICAL MEDICINE & REHABILITATION

## 2024-08-22 PROCEDURE — 7100000010 HC PHASE II RECOVERY - FIRST 15 MIN: Performed by: PHYSICAL MEDICINE & REHABILITATION

## 2024-08-22 PROCEDURE — 3600000015 HC SURGERY LEVEL 5 ADDTL 15MIN: Performed by: PHYSICAL MEDICINE & REHABILITATION

## 2024-08-22 RX ORDER — LIDOCAINE HYDROCHLORIDE 20 MG/ML
INJECTION, SOLUTION EPIDURAL; INFILTRATION; INTRACAUDAL; PERINEURAL PRN
Status: DISCONTINUED | OUTPATIENT
Start: 2024-08-22 | End: 2024-08-22 | Stop reason: ALTCHOICE

## 2024-08-22 RX ORDER — SODIUM CHLORIDE 0.9 % (FLUSH) 0.9 %
5-40 SYRINGE (ML) INJECTION EVERY 12 HOURS SCHEDULED
Status: DISCONTINUED | OUTPATIENT
Start: 2024-08-22 | End: 2024-08-22 | Stop reason: HOSPADM

## 2024-08-22 RX ORDER — SODIUM CHLORIDE 0.9 % (FLUSH) 0.9 %
5-40 SYRINGE (ML) INJECTION PRN
Status: DISCONTINUED | OUTPATIENT
Start: 2024-08-22 | End: 2024-08-22 | Stop reason: HOSPADM

## 2024-08-22 RX ORDER — SODIUM CHLORIDE 9 MG/ML
INJECTION, SOLUTION INTRAVENOUS PRN
Status: DISCONTINUED | OUTPATIENT
Start: 2024-08-22 | End: 2024-08-22 | Stop reason: HOSPADM

## 2024-08-22 RX ORDER — LIDOCAINE HYDROCHLORIDE 10 MG/ML
INJECTION, SOLUTION EPIDURAL; INFILTRATION; INTRACAUDAL; PERINEURAL PRN
Status: DISCONTINUED | OUTPATIENT
Start: 2024-08-22 | End: 2024-08-22 | Stop reason: ALTCHOICE

## 2024-08-22 ASSESSMENT — PAIN - FUNCTIONAL ASSESSMENT
PAIN_FUNCTIONAL_ASSESSMENT: 0-10
PAIN_FUNCTIONAL_ASSESSMENT: NONE - DENIES PAIN
PAIN_FUNCTIONAL_ASSESSMENT: NONE - DENIES PAIN

## 2024-08-22 ASSESSMENT — PAIN DESCRIPTION - DESCRIPTORS: DESCRIPTORS: ACHING

## 2024-08-22 NOTE — H&P
Gladys Christine DO  [facility]  [address] [place] [address]  Phone: [phone]  Fax: [phone]    PCP: Jaxson Felix MD  Date of visit: 8/22/2024    CC: low back pain       Hernando Trejo is a 88 y.o. male who presents today for lumbar RFA.   No red flag symptoms. Consents to proceed with procedure.     Allergies   Allergen Reactions    Tramadol Hcl Hallucinations       Current Facility-Administered Medications   Medication Dose Route Frequency Provider Last Rate Last Admin    sodium chloride flush 0.9 % injection 5-40 mL  5-40 mL IntraVENous 2 times per day Gladys Christine,         sodium chloride flush 0.9 % injection 5-40 mL  5-40 mL IntraVENous PRN Gladys Christine,         0.9 % sodium chloride infusion   IntraVENous PRN Gladys Christine,         sodium chloride flush 0.9 % injection 5-40 mL  5-40 mL IntraVENous 2 times per day Gladys Christine,         sodium chloride flush 0.9 % injection 5-40 mL  5-40 mL IntraVENous PRN Gladys Christine, DO        0.9 % sodium chloride infusion   IntraVENous PRN Gladys Christine,            Past Medical History:   Diagnosis Date    Acquired hypothyroidism 9/5/2021    Bundle branch block     CAD (coronary artery disease)     Diabetes mellitus (HCC)     Hyperlipidemia LDL goal < 100 8/24/2015    Lumbosacral radiculitis 10/2/2023    Spondylosis of lumbar region without myelopathy or radiculopathy 8/28/2018       Past Surgical History:   Procedure Laterality Date    ANESTHESIA NERVE BLOCK Bilateral 2/28/2019    BILATERAL L4-5 L5-S1 INTRA-ARTICULAR FACET STEROID INJECTION (CPT 41563) performed by Gladys Christine DO at [facility] OR    BACK INJECTION Bilateral 9/30/2021    BILATERAL SACROILIAC JOINT INJECTION UNDER X-RAY GUIDANCE performed by Gladys Christine DO at [facility] OR    CORONARY ARTERY BYPASS GRAFT

## 2024-08-22 NOTE — DISCHARGE INSTRUCTIONS
Post-op instruction Radiofrequency  Dr. Christine    [phone]    You may apply an ice pack wrapped in a towel to the sore area. However, do not use ice longer than 10 minutes at time. You will probably have soreness at the site where the endoscope was inserted.    Keep the surgical site clean and dry at all times.    Remove the dressing in 24 hrs and apply a sterile Band-Aid.     Go home and rest. No driving. When resting, changing positions frequently may help reduce stiffness and soreness.     The following day you may resume normal activities providing you listen to you body. Your body will tell you how active or inactive to be. Remember the rule of thumb “If it hurts, don't do it.” Increase your activity level slowly.    Do not drive a motor vehicle, operate machinery or make important decisions for 24 hour if you had sedation.     Resume normal diet and medications. Resume any blood thinners 24 hours after procedure.    Call your physician if you experience any of the following symptoms: Fever, redness and or swelling at the site, nausea and vomiting, headache, persistent pain, numbness or tingling of legs and/or feet, and bowel or bladder problems.     You may experience increased pain during this time for up to 72 hours after the procedure. It can take up to 6-8 weeks for the procedure to be effective.    Follow up with Dr. Christine or Dr. Mcarthur within 3 weeks of the procedure for a post-procedure check-up.

## 2024-08-22 NOTE — OP NOTE
ablation was then performed at 80 degree Celsius for 90 seconds. Hernando was comfortable throughout the ablation. No complications were noted.    In summary, medial branch neurolysis were performed at the following levels; right L4-5, L5-S1. Negative aspiration was noted prior to each injection. The needle was removed intact and dressings were applied.    Hernando was transferred to the recovery area. He was monitored, reassessed and discharged after an appropriate observatory period. No complications were noted.    Hernando will follow up in the office as scheduled. He was encouraged to call with questions, concerns or if worsening of symptoms occurs.      Gladys Christine DO, FAAPMR   Board Certified Physical Medicine and Rehabilitation

## 2024-09-04 RX ORDER — SODIUM CHLORIDE 0.9 % (FLUSH) 0.9 %
5-40 SYRINGE (ML) INJECTION PRN
Status: CANCELLED | OUTPATIENT
Start: 2024-09-04

## 2024-09-04 RX ORDER — SODIUM CHLORIDE 0.9 % (FLUSH) 0.9 %
5-40 SYRINGE (ML) INJECTION EVERY 12 HOURS SCHEDULED
Status: CANCELLED | OUTPATIENT
Start: 2024-09-04

## 2024-09-04 RX ORDER — SODIUM CHLORIDE 9 MG/ML
INJECTION, SOLUTION INTRAVENOUS PRN
Status: CANCELLED | OUTPATIENT
Start: 2024-09-04

## 2024-09-05 ENCOUNTER — HOSPITAL ENCOUNTER (OUTPATIENT)
Dept: OPERATING ROOM | Age: 89
Setting detail: OUTPATIENT SURGERY
Discharge: HOME | End: 2024-09-05
Attending: PHYSICAL MEDICINE & REHABILITATION
Payer: MEDICARE

## 2024-09-05 ENCOUNTER — HOSPITAL ENCOUNTER (OUTPATIENT)
Age: 89
Setting detail: OUTPATIENT SURGERY
Discharge: HOME | End: 2024-09-05
Attending: PHYSICAL MEDICINE & REHABILITATION | Admitting: PHYSICAL MEDICINE & REHABILITATION
Payer: MEDICARE

## 2024-09-05 VITALS
OXYGEN SATURATION: 95 % | HEIGHT: 69 IN | DIASTOLIC BLOOD PRESSURE: 52 MMHG | WEIGHT: 213 LBS | BODY MASS INDEX: 31.55 KG/M2 | RESPIRATION RATE: 16 BRPM | HEART RATE: 60 BPM | SYSTOLIC BLOOD PRESSURE: 133 MMHG

## 2024-09-05 DIAGNOSIS — M47.896 OTHER OSTEOARTHRITIS OF SPINE, LUMBAR REGION: ICD-10-CM

## 2024-09-05 PROCEDURE — 7100000011 HC PHASE II RECOVERY - ADDTL 15 MIN: Performed by: PHYSICAL MEDICINE & REHABILITATION

## 2024-09-05 PROCEDURE — 7100000010 HC PHASE II RECOVERY - FIRST 15 MIN: Performed by: PHYSICAL MEDICINE & REHABILITATION

## 2024-09-05 PROCEDURE — 2500000003 HC RX 250 WO HCPCS: Performed by: PHYSICAL MEDICINE & REHABILITATION

## 2024-09-05 PROCEDURE — 64635 DESTROY LUMB/SAC FACET JNT: CPT | Performed by: PHYSICAL MEDICINE & REHABILITATION

## 2024-09-05 PROCEDURE — 3600000005 HC SURGERY LEVEL 5 BASE: Performed by: PHYSICAL MEDICINE & REHABILITATION

## 2024-09-05 PROCEDURE — 64636 DESTROY L/S FACET JNT ADDL: CPT | Performed by: PHYSICAL MEDICINE & REHABILITATION

## 2024-09-05 PROCEDURE — 2709999900 HC NON-CHARGEABLE SUPPLY: Performed by: PHYSICAL MEDICINE & REHABILITATION

## 2024-09-05 RX ORDER — LIDOCAINE HYDROCHLORIDE 20 MG/ML
INJECTION, SOLUTION EPIDURAL; INFILTRATION; INTRACAUDAL; PERINEURAL PRN
Status: DISCONTINUED | OUTPATIENT
Start: 2024-09-05 | End: 2024-09-05 | Stop reason: ALTCHOICE

## 2024-09-05 RX ORDER — SODIUM CHLORIDE 0.9 % (FLUSH) 0.9 %
5-40 SYRINGE (ML) INJECTION PRN
Status: DISCONTINUED | OUTPATIENT
Start: 2024-09-05 | End: 2024-09-05 | Stop reason: HOSPADM

## 2024-09-05 RX ORDER — SODIUM CHLORIDE 0.9 % (FLUSH) 0.9 %
5-40 SYRINGE (ML) INJECTION EVERY 12 HOURS SCHEDULED
Status: DISCONTINUED | OUTPATIENT
Start: 2024-09-05 | End: 2024-09-05 | Stop reason: HOSPADM

## 2024-09-05 RX ORDER — SODIUM CHLORIDE 9 MG/ML
INJECTION, SOLUTION INTRAVENOUS PRN
Status: DISCONTINUED | OUTPATIENT
Start: 2024-09-05 | End: 2024-09-05 | Stop reason: HOSPADM

## 2024-09-05 RX ORDER — LIDOCAINE HYDROCHLORIDE 10 MG/ML
INJECTION, SOLUTION EPIDURAL; INFILTRATION; INTRACAUDAL; PERINEURAL PRN
Status: DISCONTINUED | OUTPATIENT
Start: 2024-09-05 | End: 2024-09-05 | Stop reason: ALTCHOICE

## 2024-09-05 ASSESSMENT — PAIN - FUNCTIONAL ASSESSMENT
PAIN_FUNCTIONAL_ASSESSMENT: 0-10
PAIN_FUNCTIONAL_ASSESSMENT: NONE - DENIES PAIN

## 2024-09-05 ASSESSMENT — PAIN DESCRIPTION - DESCRIPTORS
DESCRIPTORS: SORE
DESCRIPTORS: SORE

## 2024-09-05 NOTE — OP NOTE
Hernando Uebelhart    9/5/2024      PRE-OPERATIVE DIAGNOSIS:  Lumbar spondylosis,  lumbar facet arthropathy, lumbosacral OA.    POST-OPERATIVE DIAGNOSIS:  Lumbar spondylosis, lumbar facet arthropathy, lumbosacral OA.    PROCEDURE:  Fluoroscopic-guided Left  lumbar medial branch neurolysis at  levels L4-5, L5-S1    SURGEON:    Gladys Christine DO    ANESTHESIA:   Local    ESTIMATED BLOOD LOSS:  None.  ______________________________________________________________________    HISTORY & PHYSICAL:   See separate H&P    PROCEDURE:  After confirming written and informed consent, Hernando was brought to the procedure room and placed in the prone position. Blood pressure, heart rate, O2 saturation, and visual and verbal monitoring were established. A time-out was performed and his name and date of birth, the procedure to be performed as well as the plan for the location of the needle insertion were confirmed.       Fluoroscopy was utilized to delineate the anatomy of the lumbar spine. Surface landmarks were identified as well. After prep and drape, an oblique fluoroscopic view was created to optimize visualization of the junction of the transverse process and the pedicle.      After infiltration of local, # 20-gauge 100-mm 10- mm active tip radiofrequency ablation needle was passed to position the tip at the junction of the superior articular process and the transverse process, along the course of the medial branch. Satisfactory needle placement was confirmed by AP, lateral and oblique projections.     At the sacral ala level, the sacral alar region was visualized and the needle tip was positioned on the sacral ala at the base of the superior articulating process where the medial branch traverses. Satisfactory needle placement was confirmed by AP, lateral and oblique projections. Sensory and motor testing was then performed.  He  then received 0.75 cc of 1% PF xylocaine at each level.       Radiofrequency thermal ablation

## 2024-09-05 NOTE — DISCHARGE INSTRUCTIONS
Post-op instruction Block  Dr. Christine  [phone]      Rest 12-24 hours following procedure. DO NOT DRIVE till the following day.    Keep dressing clean and dry. Do not bathe, shower, or sit in hot tub the day of procedure .You may remove the dressing following A.M.    You may return to work/school tomorrow.    Drink extra fluids for the next 24 hours.    Resume your regular diet.    Resume previously prescribed medications. Resume Coumadin, Plavix, NSAIDS, Ibuprofen products 24 hours after procedure.    You need to have a responsible adult stay with you this evening.    If you experience any discomfort relating to this procedure, you may take Tylenol 2 tablets every 4 hrs as needed for pain and/or apply ice to the affected area.    Please follow up with Dr. Christine or Dr. Mcarthur. If you were a hip injection follow up with your orthopedic Doctor.    If you experience any unusual symptoms or problems, call your physician’s answering service at [phone] or go directly to [facility]’s Emergency Department.

## 2024-09-05 NOTE — H&P
Gladys Christine DO at [facility] OR    PAIN MANAGEMENT PROCEDURE Right 2024    Fluoroscopic-guided lumbar medial branch neurolysis (radiofrequency ablation) at levels L4-5, L-5-S1 performed by Gladys Christine DO at [facility] OR    TONSILLECTOMY         Family History   Problem Relation Age of Onset    Heart Disease Mother     Heart Disease Father     Heart Disease Brother        Social History     Tobacco Use    Smoking status: Former     Current packs/day: 0.00     Types: Cigarettes     Start date: 3/26/1955     Quit date: 3/26/1987     Years since quittin.4    Smokeless tobacco: Former   Vaping Use    Vaping status: Never Used   Substance Use Topics    Alcohol use: No     Alcohol/week: 0.0 standard drinks of alcohol    Drug use: No              ROS:    Constitutional: Denies fevers, chills, night sweats, unintentional weight loss     Skin: Denies rash or skin changes     Respiratory: Denies SOB or cough     Cardiovascular: Denies CP, palpitations, edema      Neurologic: See HPI.     MSK: See HPI.     Hematologic/Lymphatic/Immunologic: Denies bruising       Physical Exam:   Blood pressure (!) 97/51, pulse 68, resp. rate 16, height 1.753 m (5' 9\"), weight 96.6 kg (213 lb), SpO2 95%.   General: well developed and well nourished in no acute distress  Resp: symmetrical chest expansion, unlabored breathing, respirations unlabored.   CV: Heart rate is regular. Peripheral pulses are palpable  Skin: No rashes or ecchymosis. Normal turgor.   MSK: ttp lumbar psps      Neurological Exam:  No focal sensorimotor deficits.       Impression:     Lumbar spondylosis      Plan:   RFA as planned today           Gladys Christine DO, FAAPMR   Board Certified Physical Medicine and Rehabilitation

## 2024-09-23 ENCOUNTER — OFFICE VISIT (OUTPATIENT)
Dept: PHYSICAL MEDICINE AND REHAB | Age: 89
End: 2024-09-23
Payer: MEDICARE

## 2024-09-23 VITALS
DIASTOLIC BLOOD PRESSURE: 77 MMHG | BODY MASS INDEX: 30.81 KG/M2 | HEART RATE: 77 BPM | SYSTOLIC BLOOD PRESSURE: 123 MMHG | HEIGHT: 69 IN | WEIGHT: 208 LBS

## 2024-09-23 DIAGNOSIS — G89.29 CHRONIC BILATERAL LOW BACK PAIN WITHOUT SCIATICA: ICD-10-CM

## 2024-09-23 DIAGNOSIS — M54.50 CHRONIC BILATERAL LOW BACK PAIN WITHOUT SCIATICA: ICD-10-CM

## 2024-09-23 DIAGNOSIS — M47.819 FACET ARTHROPATHY: ICD-10-CM

## 2024-09-23 DIAGNOSIS — M47.816 LUMBAR SPONDYLOSIS: Primary | ICD-10-CM

## 2024-09-23 PROCEDURE — 1123F ACP DISCUSS/DSCN MKR DOCD: CPT | Performed by: PHYSICAL MEDICINE & REHABILITATION

## 2024-09-23 PROCEDURE — 99213 OFFICE O/P EST LOW 20 MIN: CPT | Performed by: PHYSICAL MEDICINE & REHABILITATION

## 2024-09-30 ENCOUNTER — OFFICE VISIT (OUTPATIENT)
Dept: FAMILY MEDICINE CLINIC | Age: 89
End: 2024-09-30
Payer: MEDICARE

## 2024-09-30 VITALS
WEIGHT: 203 LBS | SYSTOLIC BLOOD PRESSURE: 114 MMHG | RESPIRATION RATE: 20 BRPM | DIASTOLIC BLOOD PRESSURE: 68 MMHG | HEIGHT: 69 IN | BODY MASS INDEX: 30.07 KG/M2 | OXYGEN SATURATION: 93 % | HEART RATE: 63 BPM

## 2024-09-30 DIAGNOSIS — M54.17 RADICULAR PAIN OF LUMBOSACRAL REGION: ICD-10-CM

## 2024-09-30 DIAGNOSIS — E11.9 TYPE 2 DIABETES MELLITUS WITHOUT COMPLICATION, WITHOUT LONG-TERM CURRENT USE OF INSULIN (HCC): Primary | ICD-10-CM

## 2024-09-30 DIAGNOSIS — R35.0 BENIGN PROSTATIC HYPERPLASIA WITH URINARY FREQUENCY: ICD-10-CM

## 2024-09-30 DIAGNOSIS — N40.1 BENIGN PROSTATIC HYPERPLASIA WITH URINARY FREQUENCY: ICD-10-CM

## 2024-09-30 DIAGNOSIS — E03.9 ACQUIRED HYPOTHYROIDISM: ICD-10-CM

## 2024-09-30 DIAGNOSIS — E11.9 TYPE 2 DIABETES MELLITUS WITHOUT COMPLICATION, WITHOUT LONG-TERM CURRENT USE OF INSULIN (HCC): ICD-10-CM

## 2024-09-30 LAB
ALBUMIN: 3.9 G/DL (ref 3.5–5.2)
ALP BLD-CCNC: 76 U/L (ref 40–129)
ALT SERPL-CCNC: 13 U/L (ref 0–40)
ANION GAP SERPL CALCULATED.3IONS-SCNC: 10 MMOL/L (ref 7–16)
AST SERPL-CCNC: 22 U/L (ref 0–39)
BILIRUB SERPL-MCNC: 0.4 MG/DL (ref 0–1.2)
BUN BLDV-MCNC: 22 MG/DL (ref 6–23)
CALCIUM SERPL-MCNC: 8.8 MG/DL (ref 8.6–10.2)
CHLORIDE BLD-SCNC: 105 MMOL/L (ref 98–107)
CHOLESTEROL, TOTAL: 135 MG/DL
CO2: 25 MMOL/L (ref 22–29)
CREAT SERPL-MCNC: 1.1 MG/DL (ref 0.7–1.2)
GFR, ESTIMATED: 67 ML/MIN/1.73M2
GLUCOSE BLD-MCNC: 107 MG/DL (ref 74–99)
HBA1C MFR BLD: 5.9 %
HCT VFR BLD CALC: 40.7 % (ref 37–54)
HDLC SERPL-MCNC: 35 MG/DL
HEMOGLOBIN: 13 G/DL (ref 12.5–16.5)
LDL CHOLESTEROL: 85 MG/DL
MCH RBC QN AUTO: 31.6 PG (ref 26–35)
MCHC RBC AUTO-ENTMCNC: 31.9 G/DL (ref 32–34.5)
MCV RBC AUTO: 98.8 FL (ref 80–99.9)
PDW BLD-RTO: 15.3 % (ref 11.5–15)
PLATELET # BLD: 189 K/UL (ref 130–450)
PMV BLD AUTO: 9 FL (ref 7–12)
POTASSIUM SERPL-SCNC: 5.4 MMOL/L (ref 3.5–5)
RBC # BLD: 4.12 M/UL (ref 3.8–5.8)
SODIUM BLD-SCNC: 140 MMOL/L (ref 132–146)
TOTAL PROTEIN: 6.6 G/DL (ref 6.4–8.3)
TRIGL SERPL-MCNC: 76 MG/DL
TSH SERPL DL<=0.05 MIU/L-ACNC: 2.57 UIU/ML (ref 0.27–4.2)
VLDLC SERPL CALC-MCNC: 15 MG/DL
WBC # BLD: 5.4 K/UL (ref 4.5–11.5)

## 2024-09-30 PROCEDURE — 83036 HEMOGLOBIN GLYCOSYLATED A1C: CPT | Performed by: FAMILY MEDICINE

## 2024-09-30 PROCEDURE — 99214 OFFICE O/P EST MOD 30 MIN: CPT | Performed by: FAMILY MEDICINE

## 2024-09-30 PROCEDURE — 1123F ACP DISCUSS/DSCN MKR DOCD: CPT | Performed by: FAMILY MEDICINE

## 2024-09-30 PROCEDURE — 3044F HG A1C LEVEL LT 7.0%: CPT | Performed by: FAMILY MEDICINE

## 2024-09-30 RX ORDER — TAMSULOSIN HYDROCHLORIDE 0.4 MG/1
0.4 CAPSULE ORAL DAILY
Qty: 90 CAPSULE | Refills: 1 | Status: SHIPPED | OUTPATIENT
Start: 2024-09-30

## 2024-09-30 RX ORDER — FINASTERIDE 5 MG/1
5 TABLET, FILM COATED ORAL DAILY
Qty: 90 TABLET | Refills: 1 | Status: SHIPPED | OUTPATIENT
Start: 2024-09-30

## 2024-09-30 RX ORDER — LEVOTHYROXINE SODIUM 50 UG/1
TABLET ORAL
Qty: 90 TABLET | Refills: 1 | Status: SHIPPED | OUTPATIENT
Start: 2024-09-30

## 2024-09-30 RX ORDER — GABAPENTIN 100 MG/1
100 CAPSULE ORAL 2 TIMES DAILY
Qty: 180 CAPSULE | Refills: 1 | Status: SHIPPED | OUTPATIENT
Start: 2024-09-30 | End: 2025-09-30

## 2024-09-30 ASSESSMENT — ENCOUNTER SYMPTOMS
NAUSEA: 0
VOMITING: 0
DIARRHEA: 0
SHORTNESS OF BREATH: 0

## 2024-09-30 NOTE — PROGRESS NOTES
OFFICE PROGRESS NOTE      SUBJECTIVE:        Patient ID:   Hernando Trejo is a 88 y.o. male whopresents for   Chief Complaint   Patient presents with    Diabetes         HPI:   Patient is here to follow up on diabetes. Fasting blood sugars: Midday blood sugars: not checking.  Patient checks blood glucose 1 times per day.  Patient is following diabetic diet. Patient is a nonsmoker. Last ophthalmology visit: 6/2024. Patient is taking a daily statin.     Patient having more difficulty with urination.  States he has to sit to urinate just to take the time to empty his bladder.  Has urinary frequency day and night.  Still taking Proscar and Flomax.  Has never seen urology.      Prior to Admission medications    Medication Sig Start Date End Date Taking? Authorizing Provider   finasteride (PROSCAR) 5 MG tablet Take 1 tablet by mouth daily 9/30/24  Yes Jaxson Felix MD   gabapentin (NEURONTIN) 100 MG capsule Take 1 capsule by mouth 2 times daily. 9/30/24 9/30/25 Yes Jaxson Felix MD   levothyroxine (SYNTHROID) 50 MCG tablet TAKE ONE TABLET BY MOUTH DAILY 9/30/24  Yes Jaxson Felix MD   tamsulosin (FLOMAX) 0.4 MG capsule Take 1 capsule by mouth daily 9/30/24  Yes Jaxson Felix MD   ONE TOUCH ULTRASOFT LANCETS MISC Check blood sugar qam 3/26/24  Yes Jaxson Felix MD   blood glucose test strips (ONETOUCH ULTRA) strip Check blood sugar qam 9/6/23  Yes Jaxson Felix MD   Handicap Placard MISC by Does not apply route Reason for disability:  Arthritis of sacroiliac joint  (primary encounter diagnosis)  Lumbar spondylosis  Facet arthropathy  Impaired gait    Duration: 5 years 9/27/21  Yes Gladys Christine DO   docusate sodium (COLACE) 100 MG capsule Take 1 capsule by mouth daily   Yes Kai Latif MD   Lancet Device MISC Use daily for glucose monitoring 3/9/20  Yes Jaxson Felix MD   Blood Glucose

## 2024-10-03 DIAGNOSIS — E11.9 TYPE 2 DIABETES MELLITUS WITHOUT COMPLICATION, WITHOUT LONG-TERM CURRENT USE OF INSULIN (HCC): ICD-10-CM

## 2024-10-03 NOTE — TELEPHONE ENCOUNTER
Patient called for a refill.    Last seen 9/30/2024  Next appt 3/31/2025    Requested Prescriptions     Pending Prescriptions Disp Refills    blood glucose test strips (ONETOUCH ULTRA) strip 50 each 12     Sig: Check blood sugar qam      [facility]ZABRINA Crespo

## 2024-10-06 RX ORDER — BLOOD SUGAR DIAGNOSTIC
STRIP MISCELLANEOUS
Qty: 50 EACH | Refills: 12 | Status: SHIPPED | OUTPATIENT
Start: 2024-10-06

## 2024-11-07 ENCOUNTER — TRANSCRIBE ORDERS (OUTPATIENT)
Dept: ADMINISTRATIVE | Age: 89
End: 2024-11-07

## 2024-11-07 DIAGNOSIS — R31.21 ASYMPTOMATIC MICROSCOPIC HEMATURIA: Primary | ICD-10-CM

## 2024-11-13 ENCOUNTER — TELEPHONE (OUTPATIENT)
Dept: FAMILY MEDICINE CLINIC | Age: 89
End: 2024-11-13

## 2024-11-13 NOTE — TELEPHONE ENCOUNTER
KATIA    Pt came into office  states he has  an appt  not sure what or where says you are doing his imaging  I could not understand   dr I called pt and  explained  about CT  gave appt time and date  come to this building

## 2024-11-19 LAB
BUN SERPL-MCNC: 18 MG/DL (ref 6–23)
CREAT SERPL-MCNC: 1.1 MG/DL (ref 0.7–1.2)
GFR, ESTIMATED: 65 ML/MIN/1.73M2

## 2025-01-08 ENCOUNTER — OFFICE VISIT (OUTPATIENT)
Dept: PHYSICAL MEDICINE AND REHAB | Age: 89
End: 2025-01-08
Payer: MEDICARE

## 2025-01-08 VITALS
WEIGHT: 208 LBS | SYSTOLIC BLOOD PRESSURE: 123 MMHG | HEIGHT: 69 IN | HEART RATE: 51 BPM | DIASTOLIC BLOOD PRESSURE: 71 MMHG | BODY MASS INDEX: 30.81 KG/M2

## 2025-01-08 DIAGNOSIS — M47.819 FACET ARTHROPATHY: ICD-10-CM

## 2025-01-08 DIAGNOSIS — M47.816 LUMBAR SPONDYLOSIS: ICD-10-CM

## 2025-01-08 DIAGNOSIS — M54.50 ACUTE EXACERBATION OF CHRONIC LOW BACK PAIN: Primary | ICD-10-CM

## 2025-01-08 DIAGNOSIS — M41.9 SCOLIOSIS OF THORACOLUMBAR SPINE, UNSPECIFIED SCOLIOSIS TYPE: ICD-10-CM

## 2025-01-08 DIAGNOSIS — G89.29 ACUTE EXACERBATION OF CHRONIC LOW BACK PAIN: Primary | ICD-10-CM

## 2025-01-08 PROCEDURE — 99214 OFFICE O/P EST MOD 30 MIN: CPT | Performed by: PHYSICAL MEDICINE & REHABILITATION

## 2025-01-08 PROCEDURE — 1123F ACP DISCUSS/DSCN MKR DOCD: CPT | Performed by: PHYSICAL MEDICINE & REHABILITATION

## 2025-01-08 PROCEDURE — 1159F MED LIST DOCD IN RCRD: CPT | Performed by: PHYSICAL MEDICINE & REHABILITATION

## 2025-01-08 PROCEDURE — 1125F AMNT PAIN NOTED PAIN PRSNT: CPT | Performed by: PHYSICAL MEDICINE & REHABILITATION

## 2025-01-08 RX ORDER — ACETAMINOPHEN AND CODEINE PHOSPHATE 300; 30 MG/1; MG/1
1 TABLET ORAL EVERY 8 HOURS PRN
Qty: 60 TABLET | Refills: 0 | Status: SHIPPED | OUTPATIENT
Start: 2025-01-08 | End: 2025-02-07

## 2025-01-08 NOTE — PROGRESS NOTES
symptoms    Neurologic: See HPI.     MSK: See HPI.     Psychiatric: Denies sleep disturbance, anxiety, depression    Hematologic/Lymphatic/Immunologic: Denies bruising       Physical Exam:   Blood pressure 123/71, pulse 51, height 1.753 m (5' 9\"), weight 94.3 kg (208 lb).   General: well developed and well nourished in no acute distress. Body habitus is obese  HEENT: No rhinorrhea, sneezing, yawning, or lacrimation. No scleral icterus or conjunctival injection.  Resp: symmetrical chest expansion, unlabored breathing, respirations unlabored.   CV: Heart rate is regular. Peripheral pulses are palpable  Lymph: No visible regional lymphadenopathy.  Skin: No rashes or ecchymosis. Normal turgor.   Psych: Mood is calm. Affect is normal.   Ext: No edema noted     MSK:   Back/Hip Exam:   Inspection: Pelvis was asymmetric. Lumbar lordotic curvature was decreased. There was scoliosis.  No ecchymoses or erythema.   Palpation: Palpatory exam revealed tenderness along lumbosacral paraspinals, no ttp midline spine, no ttp SI joint sulcus, no ttp greater trochanters and TFL on both side. There was no paraspinal spasms. There were no trigger points.    ROM: ROM decreased   Forward flexed posture.     Neurological Exam:  Strength 5/5 BLE   Sensation intact BLE   Gait is forward flexed at hips.       Imaging:   X-ray L Spine   MRI L spine   XR SI joint     Impression:   Hernando Trejo is a 89 y.o. male     1. Acute exacerbation of chronic low back pain    2. Facet arthropathy    3. Lumbar spondylosis    4. Scoliosis of thoracolumbar spine, unspecified scoliosis type          Plan:   Discussed treatment options including physical therapy, medications, injections (too early to repeat), surgery.   He politely declined PT.   He would like to try a medication.   Start tylenol #3 as directed, PRN. OARRS reviewed and appropriate.     The patient was educated about the diagnosis, prognosis, indications, risks and benefits of treatment. An

## 2025-01-13 ENCOUNTER — TELEPHONE (OUTPATIENT)
Dept: PHYSICAL MEDICINE AND REHAB | Age: 89
End: 2025-01-13

## 2025-01-13 NOTE — TELEPHONE ENCOUNTER
Patient called and stated that he took the tylenol #3 and he is not taking them anymore he stated he go sick and vomited taking them.  Just KATIA.

## 2025-03-31 ENCOUNTER — OFFICE VISIT (OUTPATIENT)
Dept: FAMILY MEDICINE CLINIC | Age: 89
End: 2025-03-31
Payer: MEDICARE

## 2025-03-31 VITALS
DIASTOLIC BLOOD PRESSURE: 64 MMHG | WEIGHT: 204 LBS | SYSTOLIC BLOOD PRESSURE: 118 MMHG | BODY MASS INDEX: 30.21 KG/M2 | OXYGEN SATURATION: 97 % | HEIGHT: 69 IN | HEART RATE: 82 BPM | RESPIRATION RATE: 20 BRPM

## 2025-03-31 DIAGNOSIS — E11.9 TYPE 2 DIABETES MELLITUS WITHOUT COMPLICATION, WITHOUT LONG-TERM CURRENT USE OF INSULIN: ICD-10-CM

## 2025-03-31 DIAGNOSIS — E03.9 ACQUIRED HYPOTHYROIDISM: ICD-10-CM

## 2025-03-31 DIAGNOSIS — M54.17 RADICULAR PAIN OF LUMBOSACRAL REGION: ICD-10-CM

## 2025-03-31 DIAGNOSIS — N40.1 BENIGN PROSTATIC HYPERPLASIA WITH URINARY FREQUENCY: ICD-10-CM

## 2025-03-31 DIAGNOSIS — Z00.00 MEDICARE ANNUAL WELLNESS VISIT, SUBSEQUENT: Primary | ICD-10-CM

## 2025-03-31 DIAGNOSIS — R35.0 BENIGN PROSTATIC HYPERPLASIA WITH URINARY FREQUENCY: ICD-10-CM

## 2025-03-31 LAB — HBA1C MFR BLD: 6 %

## 2025-03-31 PROCEDURE — G0439 PPPS, SUBSEQ VISIT: HCPCS | Performed by: FAMILY MEDICINE

## 2025-03-31 PROCEDURE — 83036 HEMOGLOBIN GLYCOSYLATED A1C: CPT | Performed by: FAMILY MEDICINE

## 2025-03-31 PROCEDURE — 3044F HG A1C LEVEL LT 7.0%: CPT | Performed by: FAMILY MEDICINE

## 2025-03-31 PROCEDURE — 1160F RVW MEDS BY RX/DR IN RCRD: CPT | Performed by: FAMILY MEDICINE

## 2025-03-31 PROCEDURE — 1159F MED LIST DOCD IN RCRD: CPT | Performed by: FAMILY MEDICINE

## 2025-03-31 PROCEDURE — 1123F ACP DISCUSS/DSCN MKR DOCD: CPT | Performed by: FAMILY MEDICINE

## 2025-03-31 RX ORDER — GABAPENTIN 100 MG/1
200 CAPSULE ORAL 3 TIMES DAILY
Qty: 360 CAPSULE | Refills: 1 | Status: SHIPPED | OUTPATIENT
Start: 2025-03-31 | End: 2026-03-31

## 2025-03-31 RX ORDER — FINASTERIDE 5 MG/1
5 TABLET, FILM COATED ORAL DAILY
Qty: 90 TABLET | Refills: 1 | Status: SHIPPED | OUTPATIENT
Start: 2025-03-31

## 2025-03-31 RX ORDER — OXYBUTYNIN CHLORIDE 5 MG/1
5 TABLET, EXTENDED RELEASE ORAL 3 TIMES DAILY
COMMUNITY

## 2025-03-31 RX ORDER — LEVOTHYROXINE SODIUM 50 UG/1
TABLET ORAL
Qty: 90 TABLET | Refills: 1 | Status: SHIPPED | OUTPATIENT
Start: 2025-03-31

## 2025-03-31 RX ORDER — TAMSULOSIN HYDROCHLORIDE 0.4 MG/1
0.4 CAPSULE ORAL DAILY
Qty: 90 CAPSULE | Refills: 1 | Status: SHIPPED | OUTPATIENT
Start: 2025-03-31

## 2025-03-31 SDOH — ECONOMIC STABILITY: FOOD INSECURITY: WITHIN THE PAST 12 MONTHS, YOU WORRIED THAT YOUR FOOD WOULD RUN OUT BEFORE YOU GOT MONEY TO BUY MORE.: NEVER TRUE

## 2025-03-31 SDOH — ECONOMIC STABILITY: FOOD INSECURITY: WITHIN THE PAST 12 MONTHS, THE FOOD YOU BOUGHT JUST DIDN'T LAST AND YOU DIDN'T HAVE MONEY TO GET MORE.: NEVER TRUE

## 2025-03-31 ASSESSMENT — LIFESTYLE VARIABLES: HOW OFTEN DO YOU HAVE A DRINK CONTAINING ALCOHOL: NEVER

## 2025-03-31 ASSESSMENT — PATIENT HEALTH QUESTIONNAIRE - PHQ9
SUM OF ALL RESPONSES TO PHQ QUESTIONS 1-9: 0
SUM OF ALL RESPONSES TO PHQ QUESTIONS 1-9: 0
2. FEELING DOWN, DEPRESSED OR HOPELESS: NOT AT ALL
SUM OF ALL RESPONSES TO PHQ QUESTIONS 1-9: 0
1. LITTLE INTEREST OR PLEASURE IN DOING THINGS: NOT AT ALL
SUM OF ALL RESPONSES TO PHQ QUESTIONS 1-9: 0

## 2025-03-31 NOTE — PATIENT INSTRUCTIONS
Learning About Being Active as an Older Adult  Why is being active important as you get older?     Being active is one of the best things you can do for your health. And it's never too late to start. Being active--or getting active, if you aren't already--has definite benefits. It can:  Give you more energy,  Keep your mind sharp.  Improve balance to reduce your risk of falls.  Help you manage chronic illness with fewer medicines.  No matter how old you are, how fit you are, or what health problems you have, there is a form of activity that will work for you. And the more physical activity you can do, the better your overall health will be.  What kinds of activity can help you stay healthy?  Being more active will make your daily activities easier. Physical activity includes planned exercise and things you do in daily life. There are four types of activity:  Aerobic.  Doing aerobic activity makes your heart and lungs strong.  Includes walking, dancing, and gardening.  Aim for at least 2½ hours spread throughout the week.  It improves your energy and can help you sleep better.  Muscle-strengthening.  This type of activity can help maintain muscle and strengthen bones.  Includes climbing stairs, using resistance bands, and lifting or carrying heavy loads.  Aim for at least twice a week.  It can help protect the knees and other joints.  Stretching.  Stretching gives you better range of motion in joints and muscles.  Includes upper arm stretches, calf stretches, and gentle yoga.  Aim for at least twice a week, preferably after your muscles are warmed up from other activities.  It can help you function better in daily life.  Balancing.  This helps you stay coordinated and have good posture.  Includes heel-to-toe walking, liliya chi, and certain types of yoga.  Aim for at least 3 days a week.  It can reduce your risk of falling.  Even if you have a hard time meeting the recommendations, it's better to be more active

## 2025-03-31 NOTE — PROGRESS NOTES
Medicare Annual Wellness Visit    Hernando Trejo is here for Medicare AWV    Assessment & Plan  1. Medicare wellness visit.  -His blood pressure readings are within normal limits at 118/64.  -The questionnaire for wellness was completed, and he successfully remembered his words and samantha the clock.  -He is up to date on blood work until fall.  -Refills for gabapentin, finasteride, tamsulosin, and levothyroxine have been sent to [facility] for a 90-day supply.    2. Diabetes mellitus.  -His fasting blood glucose levels have been consistently within the range of 98 to 130, and his A1c is 6.0, indicating good control.  -No rash or skin changes reported.  -No vision issues reported; last eye exam was in 05/2024 or 06/2024.  -He is advised to continue his current diabetes management plan.    3. Back pain.  -He experienced a severe allergic reaction to CODEINE, resulting in unconsciousness and vomiting.  -The potential risks associated with CODEINE use were discussed, and it was decided to discontinue its use.  -The possibility of increasing the gabapentin dosage was discussed, and he expressed interest in this option.  -His gabapentin dosage will be increased to 200 mg twice daily, with an additional midday dose of 200 mg. A follow-up appointment will be scheduled in approximately 6 weeks to monitor his back condition.    Follow-up  The patient will follow up in 6 weeks for back pain.  Medicare annual wellness visit, subsequent  Type 2 diabetes mellitus without complication, without long-term current use of insulin ([facility])  -     POCT glycosylated hemoglobin (Hb A1C)  Radicular pain of lumbosacral region  -     gabapentin (NEURONTIN) 100 MG capsule; Take 2 capsules by mouth 3 times daily., Disp-360 capsule, R-1Normal  Benign prostatic hyperplasia with urinary frequency  -     finasteride (PROSCAR) 5 MG tablet; Take 1 tablet by mouth daily, Disp-90 tablet, R-1Normal  -     tamsulosin (FLOMAX) 0.4 MG capsule;

## 2025-05-12 ENCOUNTER — OFFICE VISIT (OUTPATIENT)
Dept: FAMILY MEDICINE CLINIC | Age: 89
End: 2025-05-12
Payer: MEDICARE

## 2025-05-12 VITALS
HEIGHT: 69 IN | WEIGHT: 207 LBS | SYSTOLIC BLOOD PRESSURE: 118 MMHG | DIASTOLIC BLOOD PRESSURE: 60 MMHG | RESPIRATION RATE: 20 BRPM | HEART RATE: 67 BPM | BODY MASS INDEX: 30.66 KG/M2 | OXYGEN SATURATION: 95 %

## 2025-05-12 DIAGNOSIS — E11.9 TYPE 2 DIABETES MELLITUS WITHOUT COMPLICATION, WITHOUT LONG-TERM CURRENT USE OF INSULIN (HCC): ICD-10-CM

## 2025-05-12 DIAGNOSIS — M47.816 SPONDYLOSIS OF LUMBAR REGION WITHOUT MYELOPATHY OR RADICULOPATHY: Primary | ICD-10-CM

## 2025-05-12 PROCEDURE — 99214 OFFICE O/P EST MOD 30 MIN: CPT | Performed by: FAMILY MEDICINE

## 2025-05-12 PROCEDURE — 1123F ACP DISCUSS/DSCN MKR DOCD: CPT | Performed by: FAMILY MEDICINE

## 2025-05-12 PROCEDURE — 1160F RVW MEDS BY RX/DR IN RCRD: CPT | Performed by: FAMILY MEDICINE

## 2025-05-12 PROCEDURE — 1159F MED LIST DOCD IN RCRD: CPT | Performed by: FAMILY MEDICINE

## 2025-05-12 RX ORDER — OXYBUTYNIN CHLORIDE 5 MG/1
TABLET ORAL
COMMUNITY
Start: 2025-05-08

## 2025-05-12 RX ORDER — PREGABALIN 75 MG/1
75 CAPSULE ORAL 2 TIMES DAILY
Qty: 60 CAPSULE | Refills: 2 | Status: SHIPPED | OUTPATIENT
Start: 2025-05-12 | End: 2026-05-12

## 2025-05-12 NOTE — TELEPHONE ENCOUNTER
Name of Medication(s) Requested:  Requested Prescriptions     Pending Prescriptions Disp Refills    ONE TOUCH ULTRASOFT LANCETS MISC 50 each 12     Sig: Check blood sugar qam       Medication is on current medication list Yes    Dosage and directions were verified? Yes    Quantity verified: 90 day supply     Pharmacy Verified?  Yes    Last Appointment:  5/12/2025    Future appts:  Future Appointments   Date Time Provider Department Center   6/26/2025  8:15 AM Jaxson Felix MD [facility] DE[facility]   4/6/2026  8:30 AM Jaxson Felix MD [facility]        (If no appt send self scheduling link. .REFILLAPPT)  Scheduling request sent?     [] Yes  [x] No    Does patient need updated?  [] Yes  [x] No

## 2025-05-12 NOTE — PROGRESS NOTES
[address], [address]   [place] [address]   [phone]   Jaxson Felix MD     Patient: Hernando Trejo  Dateof Birth: 1935  Visit Date: 5/12/25    Hernando is a 89 y.o. year old male here today for   Chief Complaint   Patient presents with    Back Pain       HPI  History of Present Illness  The patient is an 89-year-old male who presents for a follow-up of back pain.    He reports no improvement in his back pain despite an increased dosage of gabapentin. Relief was noted for the initial two days of the treatment, but subsequently, there was no further change in his condition. He does not experience any radiating pain down his legs and rates his pain as less than 5 on a scale of 1 to 10. His physical activity has been significantly limited due to the pain, with a notable decrease in walking and a cessation of gym attendance for approximately 2 years. The pain is described as constant. He mentions experiencing drowsiness for the first time yesterday after taking two doses of gabapentin, which resulted in him sleeping through the afternoon and evening. However, he does not attribute this to the medication as he has been on the same dose for several weeks without any similar side effects.      Recent lab results reviewed, including CMP, CBC, TSH which are not remarkable.          Past medical, surgical, social and/or family historyreviewed, updated as needed, and are non-contributory (unless otherwise stated).  Medications, allergies, and problem list also reviewed and updated as needed in patient's record.     Current Outpatient Medications   Medication Sig Dispense Refill    oxyBUTYnin (DITROPAN) 5 MG tablet       pregabalin (LYRICA) 75 MG capsule Take 1 capsule by mouth 2 times daily. Max Daily Amount: 150 mg 60 capsule 2    oxyBUTYnin (DITROPAN-XL) 5 MG extended release tablet Take 1 tablet by mouth in the morning, at noon, and at bedtime      finasteride

## 2025-05-14 DIAGNOSIS — E11.9 TYPE 2 DIABETES MELLITUS WITHOUT COMPLICATION, WITHOUT LONG-TERM CURRENT USE OF INSULIN (HCC): ICD-10-CM

## 2025-05-14 RX ORDER — LANCETS
EACH MISCELLANEOUS
Qty: 50 EACH | Refills: 12 | Status: SHIPPED | OUTPATIENT
Start: 2025-05-14

## 2025-05-16 ENCOUNTER — TELEPHONE (OUTPATIENT)
Dept: FAMILY MEDICINE CLINIC | Age: 89
End: 2025-05-16

## 2025-05-16 NOTE — TELEPHONE ENCOUNTER
Pt called to advise that he believes Pregabalin is causing bilateral swelling in his feet and swelling in his left hand. Pt stated the medication is working for his pain. He would like to be advised what to do.    Last seen 5/12/2025  Next appt 6/26/2025

## 2025-05-18 NOTE — TELEPHONE ENCOUNTER
Please inform patient that if the swelling is mild, he can continue the Lyrica.  It can cause swelling as a side effect, but if it is mild and is relieving his pain, he can continue taking the Lyrica. If the swelling is severe enough that he cannot wear his shoes, he needs to discontinue it.

## 2025-05-19 RX ORDER — BLOOD SUGAR DIAGNOSTIC
STRIP MISCELLANEOUS
Qty: 50 EACH | Refills: 12 | Status: SHIPPED | OUTPATIENT
Start: 2025-05-19

## 2025-05-19 RX ORDER — SYRING-NEEDL,DISP,INSUL,0.3 ML 30 GX5/16"
SYRINGE, EMPTY DISPOSABLE MISCELLANEOUS
Qty: 100 EACH | Refills: 5 | Status: SHIPPED | OUTPATIENT
Start: 2025-05-19

## 2025-06-26 ENCOUNTER — OFFICE VISIT (OUTPATIENT)
Dept: FAMILY MEDICINE CLINIC | Age: 89
End: 2025-06-26
Payer: MEDICARE

## 2025-06-26 VITALS
HEIGHT: 69 IN | SYSTOLIC BLOOD PRESSURE: 110 MMHG | RESPIRATION RATE: 18 BRPM | WEIGHT: 206.6 LBS | BODY MASS INDEX: 30.6 KG/M2 | TEMPERATURE: 97.6 F | OXYGEN SATURATION: 95 % | DIASTOLIC BLOOD PRESSURE: 60 MMHG | HEART RATE: 82 BPM

## 2025-06-26 DIAGNOSIS — M47.816 SPONDYLOSIS OF LUMBAR REGION WITHOUT MYELOPATHY OR RADICULOPATHY: Primary | ICD-10-CM

## 2025-06-26 PROCEDURE — 99214 OFFICE O/P EST MOD 30 MIN: CPT | Performed by: FAMILY MEDICINE

## 2025-06-26 PROCEDURE — 1160F RVW MEDS BY RX/DR IN RCRD: CPT | Performed by: FAMILY MEDICINE

## 2025-06-26 PROCEDURE — 1123F ACP DISCUSS/DSCN MKR DOCD: CPT | Performed by: FAMILY MEDICINE

## 2025-06-26 PROCEDURE — 1159F MED LIST DOCD IN RCRD: CPT | Performed by: FAMILY MEDICINE

## 2025-06-26 RX ORDER — SENNOSIDES 8.6 MG
1300 CAPSULE ORAL 2 TIMES DAILY
Qty: 120 TABLET | Refills: 3 | Status: SHIPPED | OUTPATIENT
Start: 2025-06-26

## 2025-06-26 ASSESSMENT — ENCOUNTER SYMPTOMS: BACK PAIN: 1

## 2025-06-26 NOTE — PROGRESS NOTES
[address], [address]   [place] [address]   [phone]   Jaxson Felix MD     Patient: Hernando Trejo  Dateof Birth: 1935  Visit Date: 6/26/25    Hernando is a 89 y.o. year old male here today for   Chief Complaint   Patient presents with    Back Pain     Back pain has stayed the same, no improvement        HPI  Back Pain  This is a chronic problem. The problem has been waxing and waning since onset. The pain is present in the lumbar spine. The quality of the pain is described as aching. The pain does not radiate. The pain is at a severity of 8/10. The pain is severe. The pain is The same all the time. Pertinent negatives include no numbness or tingling. He has tried analgesics (topical capsaicin spray) for the symptoms. The treatment provided mild relief.         Recent lab results reviewed, including CMP, CBC, and TSH.        Review of Systems   Musculoskeletal:  Positive for back pain.   Neurological:  Negative for tingling and numbness.       Past medical, surgical, social and/or family historyreviewed, updated as needed, and are non-contributory (unless otherwise stated).  Medications, allergies, and problem list also reviewed and updated as needed in patient's record.     Current Outpatient Medications   Medication Sig Dispense Refill    acetaminophen (TYLENOL 8 HOUR ARTHRITIS PAIN) 650 MG extended release tablet Take 2 tablets by mouth 2 times daily 120 tablet 3    blood glucose test strips (ONETOUCH ULTRA) strip Check blood sugar qam 50 each 12    Lancet Device MISC Use daily for glucose monitoring 100 each 5    ONE TOUCH ULTRASOFT LANCETS MISC Check blood sugar qam 50 each 12    oxyBUTYnin (DITROPAN) 5 MG tablet       finasteride (PROSCAR) 5 MG tablet Take 1 tablet by mouth daily 90 tablet 1    tamsulosin (FLOMAX) 0.4 MG capsule Take 1 capsule by mouth daily 90 capsule 1    levothyroxine (SYNTHROID) 50 MCG tablet TAKE ONE TABLET BY MOUTH DAILY 90

## 2025-07-02 LAB — DIABETIC RETINOPATHY: NEGATIVE

## (undated) DEVICE — TOWEL OR BLUEE 16X26IN ST 8 PACK ORB08 16X26ORTWL

## (undated) DEVICE — 3M™ RED DOT™ MONITORING ELECTRODE WITH FOAM TAPE AND STICKY GEL 2560, 50/BAG, 20/CASE, 72/PLT: Brand: RED DOT™

## (undated) DEVICE — ELECTRODE SURG MPLR NEUT SELF ADH PT PLT MULTIGEN

## (undated) DEVICE — GAUZE,SPONGE,4"X4",12PLY,STERILE,LF,2'S: Brand: MEDLINE

## (undated) DEVICE — BANDAGE ADH W1XL3IN NAT FAB WVN FLX DURABLE N ADH PD SEAL

## (undated) DEVICE — NEEDLE HYPO 18GA L1.5IN PNK POLYPR HUB S STL THN WALL FILL

## (undated) DEVICE — 6 ML SYRINGE LUER-LOCK TIP: Brand: MONOJECT

## (undated) DEVICE — 3 ML SYRINGE LUER-LOCK TIP: Brand: MONOJECT

## (undated) DEVICE — NEEDLE SPNL WEISS LNG 18 GAX5 IN MOD TUOHY PT TW PERISAFE

## (undated) DEVICE — NON-DEHP CATHETER EXTENSION SET, MALE LUER LOCK ADAPTER

## (undated) DEVICE — CVD CANNULA

## (undated) DEVICE — NEEDLE SPNL 22GA L3.5IN BLK HUB S STL REG WALL FIT STYL W/

## (undated) DEVICE — APPLICATOR MEDICATED 26 CC SOLUTION HI LT ORNG CHLORAPREP

## (undated) DEVICE — TOWEL,OR,DSP,ST,BLUE,STD,6/PK,12PK/CS: Brand: MEDLINE

## (undated) DEVICE — ENCORE® LATEX TEXTURED SIZE 6.5, STERILE LATEX POWDER-FREE SURGICAL GLOVE: Brand: ENCORE

## (undated) DEVICE — DRAPE SHEET, X-LARGE: Brand: CONVERTORS

## (undated) DEVICE — Z DISCONTINUED APPLICATOR SURG PREP 0.35OZ 2% CHG 70% ISO ALC W/ HI LT

## (undated) DEVICE — NEEDLE HYPO 25GA L1.5IN BLU POLYPR HUB S STL REG BVL STR

## (undated) DEVICE — MARKER,SKIN,WI/RULER AND LABELS: Brand: MEDLINE

## (undated) DEVICE — NEEDLE HYPO 18GA L1.5IN PNK POLYPR HUB S STL REG BVL STR